# Patient Record
Sex: FEMALE | Race: BLACK OR AFRICAN AMERICAN | Employment: FULL TIME | URBAN - METROPOLITAN AREA
[De-identification: names, ages, dates, MRNs, and addresses within clinical notes are randomized per-mention and may not be internally consistent; named-entity substitution may affect disease eponyms.]

---

## 2017-10-04 DIAGNOSIS — E11.9 TYPE 2 DIABETES MELLITUS WITHOUT COMPLICATION, WITHOUT LONG-TERM CURRENT USE OF INSULIN (HCC): Chronic | ICD-10-CM

## 2017-10-04 NOTE — TELEPHONE ENCOUNTER
E-scribe request for MetFORMIN HCl Oral Tablet 1000 MG. Please review and e-scribe if applicable. Last Visit Date:  11/18/16  Next Visit Date:  10/10/17    Hemoglobin A1C (%)   Date Value   11/18/2016 7.1   05/12/2016 7.5   10/12/2015 7.4             ( goal A1C is < 7)   Microalb/Crt.  Ratio (mcg/mg creat)   Date Value   05/28/2015 83     LDL Cholesterol (mg/dL)   Date Value   07/10/2015 95       (goal LDL is <100)   AST (U/L)   Date Value   05/12/2016 17     ALT (U/L)   Date Value   05/12/2016 21     BUN (mg/dL)   Date Value   11/18/2016 13     BP Readings from Last 3 Encounters:   11/18/16 134/86   05/12/16 135/86   05/11/16 148/80          (goal 120/80)        Patient Active Problem List:     Seasonal allergies     HTN (hypertension)     DM (diabetes mellitus)     Dyslipidemia     Solitary kidney     Cancer of breast (HCC)     Hip pain, bilateral     Neuropathy due to secondary diabetes ([facility]      ----JF

## 2017-11-22 ENCOUNTER — TELEPHONE (OUTPATIENT)
Dept: INTERNAL MEDICINE | Age: 57
End: 2017-11-22

## 2017-11-22 DIAGNOSIS — E11.9 TYPE 2 DIABETES MELLITUS WITHOUT COMPLICATION, WITHOUT LONG-TERM CURRENT USE OF INSULIN (HCC): Primary | Chronic | ICD-10-CM

## 2017-11-28 ENCOUNTER — OFFICE VISIT (OUTPATIENT)
Dept: INTERNAL MEDICINE | Age: 57
End: 2017-11-28

## 2017-11-28 VITALS
WEIGHT: 253 LBS | HEART RATE: 78 BPM | HEIGHT: 69 IN | DIASTOLIC BLOOD PRESSURE: 85 MMHG | SYSTOLIC BLOOD PRESSURE: 124 MMHG | BODY MASS INDEX: 37.47 KG/M2

## 2017-11-28 DIAGNOSIS — E11.9 TYPE 2 DIABETES MELLITUS WITHOUT COMPLICATION, WITHOUT LONG-TERM CURRENT USE OF INSULIN (HCC): Primary | Chronic | ICD-10-CM

## 2017-11-28 DIAGNOSIS — Z23 NEED FOR IMMUNIZATION AGAINST INFLUENZA: ICD-10-CM

## 2017-11-28 DIAGNOSIS — Z12.39 BREAST CANCER SCREENING: ICD-10-CM

## 2017-11-28 DIAGNOSIS — J30.2 SEASONAL ALLERGIC RHINITIS, UNSPECIFIED CHRONICITY, UNSPECIFIED TRIGGER: ICD-10-CM

## 2017-11-28 DIAGNOSIS — N95.1 VAGINAL DRYNESS, MENOPAUSAL: ICD-10-CM

## 2017-11-28 DIAGNOSIS — E11.8 TYPE 2 DIABETES MELLITUS WITH COMPLICATION, UNSPECIFIED LONG TERM INSULIN USE STATUS: ICD-10-CM

## 2017-11-28 DIAGNOSIS — I10 ESSENTIAL HYPERTENSION: Chronic | ICD-10-CM

## 2017-11-28 LAB — HBA1C MFR BLD: 8.2 %

## 2017-11-28 PROCEDURE — 90688 IIV4 VACCINE SPLT 0.5 ML IM: CPT

## 2017-11-28 PROCEDURE — 99214 OFFICE O/P EST MOD 30 MIN: CPT

## 2017-11-28 PROCEDURE — 99214 OFFICE O/P EST MOD 30 MIN: CPT | Performed by: INTERNAL MEDICINE

## 2017-11-28 PROCEDURE — 83036 HEMOGLOBIN GLYCOSYLATED A1C: CPT | Performed by: INTERNAL MEDICINE

## 2017-11-28 RX ORDER — GLUCOSAMINE HCL/CHONDROITIN SU 500-400 MG
CAPSULE ORAL
Qty: 100 STRIP | Refills: 5 | Status: SHIPPED | OUTPATIENT
Start: 2017-11-28 | End: 2019-01-11 | Stop reason: SDUPTHER

## 2017-11-28 RX ORDER — SPIRONOLACTONE 25 MG/1
TABLET ORAL
Qty: 90 TABLET | Refills: 0 | Status: SHIPPED | OUTPATIENT
Start: 2017-11-28 | End: 2018-06-19 | Stop reason: SDUPTHER

## 2017-11-28 RX ORDER — BENAZEPRIL HYDROCHLORIDE 40 MG/1
TABLET, FILM COATED ORAL
Qty: 90 TABLET | Refills: 0 | Status: SHIPPED | OUTPATIENT
Start: 2017-11-28 | End: 2018-06-20 | Stop reason: SDUPTHER

## 2017-11-28 RX ORDER — M-VIT,TX,IRON,MINS/CALC/FOLIC 27MG-0.4MG
1 TABLET ORAL DAILY
Qty: 90 TABLET | Refills: 1 | Status: SHIPPED | OUTPATIENT
Start: 2017-11-28 | End: 2019-01-11 | Stop reason: SDUPTHER

## 2017-11-28 RX ORDER — LANCETS
EACH MISCELLANEOUS
Qty: 100 EACH | Refills: 5 | Status: SHIPPED | OUTPATIENT
Start: 2017-11-28 | End: 2021-11-01

## 2017-11-28 RX ORDER — AMLODIPINE BESYLATE 10 MG/1
TABLET ORAL
Qty: 90 TABLET | Refills: 0 | Status: SHIPPED | OUTPATIENT
Start: 2017-11-28 | End: 2019-01-11 | Stop reason: SDUPTHER

## 2017-11-28 RX ORDER — ESTRADIOL 0.1 MG/G
1 CREAM VAGINAL DAILY
Qty: 1 TUBE | Refills: 2 | Status: SHIPPED | OUTPATIENT
Start: 2017-11-28 | End: 2019-01-11 | Stop reason: SDUPTHER

## 2017-11-28 RX ORDER — LORATADINE 10 MG/1
10 TABLET ORAL DAILY
Qty: 90 TABLET | Refills: 0 | Status: SHIPPED | OUTPATIENT
Start: 2017-11-28 | End: 2019-01-11 | Stop reason: SDUPTHER

## 2017-11-28 RX ORDER — GLIMEPIRIDE 4 MG/1
4 TABLET ORAL
Qty: 90 TABLET | Refills: 0 | Status: SHIPPED | OUTPATIENT
Start: 2017-11-28 | End: 2018-04-12 | Stop reason: SDUPTHER

## 2017-11-28 RX ORDER — UBIQUINOL 100 MG
CAPSULE ORAL
Qty: 100 EACH | Refills: 5 | Status: SHIPPED | OUTPATIENT
Start: 2017-11-28 | End: 2019-01-11 | Stop reason: SDUPTHER

## 2017-11-28 RX ORDER — CARVEDILOL 12.5 MG/1
TABLET ORAL
Qty: 180 TABLET | Refills: 0 | Status: SHIPPED | OUTPATIENT
Start: 2017-11-28 | End: 2018-04-12 | Stop reason: SDUPTHER

## 2017-11-28 RX ORDER — ATORVASTATIN CALCIUM 80 MG/1
80 TABLET, FILM COATED ORAL DAILY
Qty: 90 TABLET | Refills: 0 | Status: SHIPPED | OUTPATIENT
Start: 2017-11-28 | End: 2018-06-11 | Stop reason: SDUPTHER

## 2017-11-28 NOTE — PROGRESS NOTES
[facility]  [facility] [address] [facility]  [address]  Dept: [phone]      Today's Date: 11/28/2017  Patient Name: Nery Chavez  Patient's age: 59 y.o., 1960        CHIEF COMPLAINT:    Chief Complaint   Patient presents with    Hypertension     1 year follow up    Diabetes    Health Maintenance     Hep C, HIV, Tdap, Urine, Lipid, Foot, Eye, Flu, and A1C due. no insurance       History Obtained From:  patient    HISTORY OF PRESENT ILLNESS:      The patient is a 59 y.o. old  female and is here to Follow-up for hypertension and diabetes. Her A1c is increased to 8.2. She is on Amaryl 2 mg twice a day. She states that she has been skipping the second dose as she forgets to take it. She is also on metformin 1000 mg twice a day. She is tolerating her medications well. Her blood pressure is well controlled with Norvasc 10 mg daily, Coreg 12.5 mg twice a day, Aldactone 25 mg daily and Benzapril 40 mg daily. Denies any headache, chest pain, palpitation or fatigue. No leg edema or orthopnea. Patient Active Problem List   Diagnosis    Seasonal allergies    HTN (hypertension)    DM (diabetes mellitus)    Dyslipidemia    Solitary kidney    Cancer of breast ([facility]    Hip pain, bilateral    Neuropathy due to secondary diabetes [facility])       Past Medical History:   has a past medical history of Allergic rhinitis; Avascular necrosis ([facility] [facility] Breast cancer ([facility] [address] Congenital kidney disease; DM (diabetes mellitus) ([address] High cholesterol; Hip pain, bilateral; History of anemia; Hypertension; Neuropathy ([address] Snores; Type II or unspecified type diabetes mellitus without mention of complication, not stated as uncontrolled; and Uterine fibroid. Past Surgical History:   has a past surgical history that includes Hysterectomy; partial nephrectomy; Colonoscopy; Breast lumpectomy (Left, 2000);  Ankle fracture surgery (Right); and Upper gastrointestinal endoscopy. Medications:    Current Outpatient Prescriptions   Medication Sig Dispense Refill    metFORMIN (GLUCOPHAGE) 1000 MG tablet TAKE 1 TABLET BY MOUTH TWO TIMES A DAY  60 tablet 2    atorvastatin (LIPITOR) 80 MG tablet TAKE 1 TABLET BY MOUTH ONE TIME A DAY  90 tablet 0    linagliptin (TRADJENTA) 5 MG tablet Take 1 tablet by mouth daily 30 tablet 3    glimepiride (AMARYL) 2 MG tablet 1 tab in AM and 1 tab in  tablet 1    amLODIPine (NORVASC) 10 MG tablet TAKE 1 TABLET BY MOUTH ONE TIME A DAY 90 tablet 1    carvedilol (COREG) 12.5 MG tablet TAKE ONE TABLET BY MOUTH TWICE A DAY WITH MEALS 180 tablet 1    spironolactone (ALDACTONE) 25 MG tablet TAKE ONE TABLET BY MOUTH EVERY DAY 90 tablet 1    benazepril (LOTENSIN) 40 MG tablet TAKE ONE TABLET BY MOUTH DAILY 90 tablet 1    Glucose Blood (BLOOD GLUCOSE TEST STRIPS) STRP Use 4 times per day Diagnosis:  Diabetes Non-Insulin Dependent 100 strip 11    loratadine (CLARITIN) 10 MG tablet Take 1 tablet by mouth daily 30 tablet 4    Alcohol Swabs (ALCOHOL PREP) 70 % PADS Use 4 times times per day Diagnosis:  Diabetes Non-Insulin Dependent 100 each 11    [employer] LANCETS THIN 26G MISC USE THREE TIMES A  each 10    Multiple Vitamins-Minerals (THERAPEUTIC MULTIVITAMIN-MINERALS) tablet Take 1 tablet by mouth daily 30 tablet 11     No current facility-administered medications for this visit. Allergies: Other    Social History:   reports that she has never smoked. She has never used smokeless tobacco. She reports that she does not drink alcohol or use drugs. Family History: family history includes Cancer in her father; Shalini Weiner in her paternal aunt; Diabetes in her mother; Heart Disease in her father; High Blood Pressure in her mother. REVIEW OF SYSTEMS:      Constitutional: Negative for fever and fatigue. HENT: Negative for congestion and sore throat. Eyes: Negative for eye pain and visual disturbance. Respiratory: Negative for chest tightness and shortness of breath. Cardiovascular: Negative for chest pain and orthopnea . Gastrointestinal: Negative for vomiting, abdominal pain, constipation and diarrhea. Endocrine: Negative for cold intolerance, heat intolerance, polydipsia and polyuria. Genitourinary: Negative for dysuria and frequency. Musculoskeletal: Negative for  Myalgia, back pain, bone pain and arthralgias. Neurological: Negative for dizziness, weakness and headaches. PHYSICAL EXAM:        /85 (Site: Right Arm, Position: Sitting, Cuff Size: Large Adult)   Pulse 78   Ht 5' 9\" (1.753 m)   Wt 253 lb (114.8 kg)   BMI 37.36 kg/m²      General appearance - well appearing, not in  distress. Mental status - alert and cooperative . Head: Normocephalic, without obvious abnormality, atraumatic. Eye: PERRL, conjunctiva/corneas clear, EOM's intact. Neck - Supple, no significant adenopathy . Chest - Clear to auscultation, no wheezes, rales or rhonchi, symmetric air entry. Heart -  regular rhythm, normal S1, S2, no murmurs. Abdomen - Soft, nontender, nondistended, no masses or organomegaly. Neurological - Alert, oriented, normal speech, no focal findings or movement disorder noted. Musculoskeletal - No joint tenderness, deformity or swelling. Extremities -  No pedal edema, no clubbing or cyanosis.   Foot exam : No skin thickening, callus or ulcer, B/L DP palpable, ankle reflex normal ,monofilament test normal.       Labs:       Chemistry        Component Value Date/Time     11/18/2016 0951    K 4.2 11/18/2016 0951    CL 98 11/18/2016 0951    CO2 26 11/18/2016 0951    BUN 13 11/18/2016 0951    CREATININE 0.80 11/18/2016 0951        Component Value Date/Time    CALCIUM 9.6 11/18/2016 0951    ALKPHOS 108 (H) 05/12/2016 1003    AST 17 05/12/2016 1003    ALT 21 05/12/2016 1003    BILITOT 0.19 (L) 05/12/2016 1003          No results for input(s): GLUCOSE in the last 72 Refill: 5  -  DIABETES FOOT EXAM    2. Essential hypertension  Improved with current medication. - amLODIPine (NORVASC) 10 MG tablet; TAKE 1 TABLET BY MOUTH ONE TIME A DAY  Dispense: 90 tablet; Refill: 0  - carvedilol (COREG) 12.5 MG tablet; TAKE ONE TABLET BY MOUTH TWICE A DAY WITH MEALS  Dispense: 180 tablet; Refill: 0  - spironolactone (ALDACTONE) 25 MG tablet; TAKE ONE TABLET BY MOUTH EVERY DAY  Dispense: 90 tablet; Refill: 0  - benazepril (LOTENSIN) 40 MG tablet; TAKE ONE TABLET BY MOUTH DAILY  Dispense: 90 tablet; Refill: 0    3. Type 2 diabetes mellitus with complication, unspecified long term insulin use status (HCC)    - Alcohol Swabs (ALCOHOL PREP) 70 % PADS; Use 4 times times per day Diagnosis:  Diabetes Non-Insulin Dependent  Dispense: 100 each; Refill: 5    4. Seasonal allergic rhinitis, unspecified chronicity, unspecified trigger  - loratadine (CLARITIN) 10 MG tablet; Take 1 tablet by mouth daily  Dispense: 90 tablet; Refill: 0    5. Need for immunization against influenza    - INFLUENZA, QUADV, 6 MO AND OLDER, IM, MDV, 0.5ML (FLULAVAL QUADV)  - [place] ADMIN INFLUENZA VIRUS VAC      · Change Amary to 4 mg daily ,   · Medication compliance was discussed. · meds refilled   · Labs as ordered   · Jules Matias received counseling on the following healthy behaviors: exercise and medication adherence  · Discussed use, benefit, and side effects of prescribed medications. Barriers to medication compliance addressed. All patient questions answered. Pt voiced understanding.    · The return visit should be in 3 months      Terra Perez MD  Attending and Faculty Internal Medicine  [address] [place] [address] [address] [facility]  [address]  Dept: [phone]  11/28/17      This note is created with the assistance of a speech-recognition program. While intending to generate a document that actually reflects the content of the visit, the document can still have some

## 2017-11-29 ENCOUNTER — HOSPITAL ENCOUNTER (OUTPATIENT)
Age: 57
Setting detail: SPECIMEN
Discharge: HOME | End: 2017-11-29

## 2017-11-29 DIAGNOSIS — E11.9 TYPE 2 DIABETES MELLITUS WITHOUT COMPLICATION, WITHOUT LONG-TERM CURRENT USE OF INSULIN (HCC): Chronic | ICD-10-CM

## 2017-11-29 LAB
ABSOLUTE EOS #: 0.16 K/UL (ref 0–0.44)
ABSOLUTE IMMATURE GRANULOCYTE: 0.03 K/UL (ref 0–0.3)
ABSOLUTE LYMPH #: 2.04 K/UL (ref 1.1–3.7)
ABSOLUTE MONO #: 0.81 K/UL (ref 0.1–1.2)
ALBUMIN SERPL-MCNC: 4.1 G/DL (ref 3.5–5.2)
ALBUMIN/GLOBULIN RATIO: 1.1 (ref 1–2.5)
ALP BLD-CCNC: 110 U/L (ref 35–104)
ALT SERPL-CCNC: 12 U/L (ref 5–33)
ANION GAP SERPL CALCULATED.3IONS-SCNC: 17 MMOL/L (ref 9–17)
AST SERPL-CCNC: 11 U/L
BASOPHILS # BLD: 0 % (ref 0–2)
BASOPHILS ABSOLUTE: <0.03 K/UL (ref 0–0.2)
BILIRUB SERPL-MCNC: 0.35 MG/DL (ref 0.3–1.2)
BUN BLDV-MCNC: 16 MG/DL (ref 6–20)
BUN/CREAT BLD: ABNORMAL (ref 9–20)
CALCIUM SERPL-MCNC: 9.6 MG/DL (ref 8.6–10.4)
CHLORIDE BLD-SCNC: 102 MMOL/L (ref 98–107)
CHOLESTEROL/HDL RATIO: 3.8
CHOLESTEROL: 165 MG/DL
CO2: 23 MMOL/L (ref 20–31)
CREAT SERPL-MCNC: 0.74 MG/DL (ref 0.5–0.9)
CREATININE URINE: 179.8 MG/DL (ref 28–217)
DIFFERENTIAL TYPE: ABNORMAL
EOSINOPHILS RELATIVE PERCENT: 3 % (ref 1–4)
GFR AFRICAN AMERICAN: >60 ML/MIN
GFR NON-AFRICAN AMERICAN: >60 ML/MIN
GFR SERPL CREATININE-BSD FRML MDRD: ABNORMAL ML/MIN/{1.73_M2}
GFR SERPL CREATININE-BSD FRML MDRD: ABNORMAL ML/MIN/{1.73_M2}
GLUCOSE BLD-MCNC: 126 MG/DL (ref 70–99)
HCT VFR BLD CALC: 43.7 % (ref 36.3–47.1)
HDLC SERPL-MCNC: 44 MG/DL
HEMOGLOBIN: 13.4 G/DL (ref 11.9–15.1)
IMMATURE GRANULOCYTES: 1 %
LDL CHOLESTEROL: 100 MG/DL (ref 0–130)
LYMPHOCYTES # BLD: 33 % (ref 24–43)
MCH RBC QN AUTO: 28.6 PG (ref 25.2–33.5)
MCHC RBC AUTO-ENTMCNC: 30.7 G/DL (ref 28.4–34.8)
MCV RBC AUTO: 93.4 FL (ref 82.6–102.9)
MICROALBUMIN/CREAT 24H UR: 44 MG/L
MICROALBUMIN/CREAT UR-RTO: 24 MCG/MG CREAT
MONOCYTES # BLD: 13 % (ref 3–12)
PDW BLD-RTO: 15.1 % (ref 11.8–14.4)
PLATELET # BLD: 333 K/UL (ref 138–453)
PLATELET ESTIMATE: ABNORMAL
PMV BLD AUTO: 10.9 FL (ref 8.1–13.5)
POTASSIUM SERPL-SCNC: 4.3 MMOL/L (ref 3.7–5.3)
RBC # BLD: 4.68 M/UL (ref 3.95–5.11)
RBC # BLD: ABNORMAL 10*6/UL
SEG NEUTROPHILS: 50 % (ref 36–65)
SEGMENTED NEUTROPHILS ABSOLUTE COUNT: 3.09 K/UL (ref 1.5–8.1)
SODIUM BLD-SCNC: 142 MMOL/L (ref 135–144)
TOTAL PROTEIN: 7.8 G/DL (ref 6.4–8.3)
TRIGL SERPL-MCNC: 107 MG/DL
VLDLC SERPL CALC-MCNC: NORMAL MG/DL (ref 1–30)
WBC # BLD: 6.2 K/UL (ref 3.5–11.3)
WBC # BLD: ABNORMAL 10*3/UL

## 2017-11-29 PROCEDURE — 85025 COMPLETE CBC W/AUTO DIFF WBC: CPT

## 2017-11-29 PROCEDURE — 36415 COLL VENOUS BLD VENIPUNCTURE: CPT

## 2017-11-29 PROCEDURE — 80061 LIPID PANEL: CPT

## 2017-11-29 PROCEDURE — 80053 COMPREHEN METABOLIC PANEL: CPT

## 2017-11-29 PROCEDURE — 82570 ASSAY OF URINE CREATININE: CPT

## 2017-11-29 PROCEDURE — 82043 UR ALBUMIN QUANTITATIVE: CPT

## 2017-12-01 ENCOUNTER — TELEPHONE (OUTPATIENT)
Dept: INTERNAL MEDICINE | Age: 57
End: 2017-12-01

## 2017-12-01 DIAGNOSIS — C50.912 MALIGNANT NEOPLASM OF LEFT FEMALE BREAST, UNSPECIFIED ESTROGEN RECEPTOR STATUS, UNSPECIFIED SITE OF BREAST (HCC): Primary | ICD-10-CM

## 2017-12-01 NOTE — TELEPHONE ENCOUNTER
PC from patient returning call, let know she needed to be in office, wanted to know if she could get it done since she was just seen 3 days ago. Pt states she would be going to [address] for the Mammogram, gave her scheduling number to set that up. Let her know that her paper for preventing blindness would be up front on second floor for her to  and fill out.

## 2017-12-04 NOTE — TELEPHONE ENCOUNTER
PC to pt-spoke with her and let her know that order was placed phone number to scheduling given to pt she will call and set up appt

## 2017-12-04 NOTE — TELEPHONE ENCOUNTER
PC from patient stating that she has been having pain under breast by armpit on both sides, would like diagnostic mammogram as she has previous history of breast cancer and lumpectomy on Left side, order pended. Pt states she will  order once completed if we can call her back at [phone].

## 2017-12-07 ENCOUNTER — TELEPHONE (OUTPATIENT)
Dept: INTERNAL MEDICINE | Age: 57
End: 2017-12-07

## 2017-12-08 NOTE — TELEPHONE ENCOUNTER
Writer unable to leave message for patient to contact office regarding labs due to mailbox being full. Will call patient again.

## 2017-12-12 ENCOUNTER — HOSPITAL ENCOUNTER (OUTPATIENT)
Dept: ULTRASOUND IMAGING | Age: 57
Discharge: HOME | End: 2017-12-12

## 2017-12-12 ENCOUNTER — TELEPHONE (OUTPATIENT)
Dept: INTERNAL MEDICINE | Age: 57
End: 2017-12-12

## 2017-12-12 ENCOUNTER — HOSPITAL ENCOUNTER (OUTPATIENT)
Dept: MAMMOGRAPHY | Age: 57
Discharge: HOME | End: 2017-12-12

## 2017-12-12 DIAGNOSIS — Z12.39 BREAST CANCER SCREENING: ICD-10-CM

## 2017-12-12 DIAGNOSIS — R52 PAIN: ICD-10-CM

## 2017-12-12 DIAGNOSIS — C50.912 MALIGNANT NEOPLASM OF LEFT FEMALE BREAST, UNSPECIFIED ESTROGEN RECEPTOR STATUS, UNSPECIFIED SITE OF BREAST (HCC): ICD-10-CM

## 2017-12-12 PROCEDURE — 76642 ULTRASOUND BREAST LIMITED: CPT

## 2017-12-12 PROCEDURE — G0279 TOMOSYNTHESIS, MAMMO: HCPCS

## 2018-04-11 DIAGNOSIS — E11.9 TYPE 2 DIABETES MELLITUS WITHOUT COMPLICATION, WITHOUT LONG-TERM CURRENT USE OF INSULIN (HCC): Chronic | ICD-10-CM

## 2018-04-12 ENCOUNTER — OFFICE VISIT (OUTPATIENT)
Dept: INTERNAL MEDICINE | Age: 58
End: 2018-04-12

## 2018-04-12 ENCOUNTER — TELEPHONE (OUTPATIENT)
Dept: INTERNAL MEDICINE | Age: 58
End: 2018-04-12

## 2018-04-12 VITALS
HEIGHT: 68 IN | WEIGHT: 246 LBS | DIASTOLIC BLOOD PRESSURE: 94 MMHG | SYSTOLIC BLOOD PRESSURE: 147 MMHG | BODY MASS INDEX: 37.28 KG/M2 | HEART RATE: 81 BPM

## 2018-04-12 DIAGNOSIS — E11.9 TYPE 2 DIABETES MELLITUS WITHOUT COMPLICATION, WITHOUT LONG-TERM CURRENT USE OF INSULIN (HCC): Chronic | ICD-10-CM

## 2018-04-12 DIAGNOSIS — Z23 NEED FOR ZOSTER VACCINATION: Primary | ICD-10-CM

## 2018-04-12 DIAGNOSIS — I10 ESSENTIAL HYPERTENSION: Chronic | ICD-10-CM

## 2018-04-12 DIAGNOSIS — Z23 NEED FOR DIPHTHERIA-TETANUS-PERTUSSIS (TDAP) VACCINE: ICD-10-CM

## 2018-04-12 LAB — HBA1C MFR BLD: 6.2 %

## 2018-04-12 PROCEDURE — 99214 OFFICE O/P EST MOD 30 MIN: CPT

## 2018-04-12 PROCEDURE — 83036 HEMOGLOBIN GLYCOSYLATED A1C: CPT | Performed by: INTERNAL MEDICINE

## 2018-04-12 PROCEDURE — 99214 OFFICE O/P EST MOD 30 MIN: CPT | Performed by: INTERNAL MEDICINE

## 2018-04-12 RX ORDER — GLIMEPIRIDE 4 MG/1
4 TABLET ORAL
Qty: 90 TABLET | Refills: 0 | Status: SHIPPED | OUTPATIENT
Start: 2018-04-12 | End: 2018-06-19 | Stop reason: SDUPTHER

## 2018-04-12 RX ORDER — GLIMEPIRIDE 4 MG/1
TABLET ORAL
Qty: 90 TABLET | Refills: 0 | OUTPATIENT
Start: 2018-04-12

## 2018-04-12 RX ORDER — CARVEDILOL 25 MG/1
TABLET ORAL
Qty: 60 TABLET | Refills: 2 | Status: SHIPPED | OUTPATIENT
Start: 2018-04-12 | End: 2018-06-20 | Stop reason: SDUPTHER

## 2018-04-12 ASSESSMENT — PATIENT HEALTH QUESTIONNAIRE - PHQ9
2. FEELING DOWN, DEPRESSED OR HOPELESS: 0
1. LITTLE INTEREST OR PLEASURE IN DOING THINGS: 0
SUM OF ALL RESPONSES TO PHQ QUESTIONS 1-9: 0
SUM OF ALL RESPONSES TO PHQ9 QUESTIONS 1 & 2: 0

## 2018-04-24 DIAGNOSIS — E11.9 TYPE 2 DIABETES MELLITUS WITHOUT COMPLICATION, WITHOUT LONG-TERM CURRENT USE OF INSULIN (HCC): Chronic | ICD-10-CM

## 2018-06-12 RX ORDER — ATORVASTATIN CALCIUM 80 MG/1
TABLET, FILM COATED ORAL
Qty: 90 TABLET | Refills: 0 | Status: SHIPPED | OUTPATIENT
Start: 2018-06-12 | End: 2018-11-02 | Stop reason: SDUPTHER

## 2018-06-19 DIAGNOSIS — E11.9 TYPE 2 DIABETES MELLITUS WITHOUT COMPLICATION, WITHOUT LONG-TERM CURRENT USE OF INSULIN (HCC): Chronic | ICD-10-CM

## 2018-06-19 DIAGNOSIS — I10 ESSENTIAL HYPERTENSION: Chronic | ICD-10-CM

## 2018-06-20 RX ORDER — SPIRONOLACTONE 25 MG/1
TABLET ORAL
Qty: 90 TABLET | Refills: 0 | Status: SHIPPED | OUTPATIENT
Start: 2018-06-20 | End: 2018-11-12 | Stop reason: SDUPTHER

## 2018-06-20 RX ORDER — CARVEDILOL 12.5 MG/1
TABLET ORAL
Qty: 180 TABLET | Refills: 0 | Status: SHIPPED | OUTPATIENT
Start: 2018-06-20 | End: 2019-01-11 | Stop reason: SDUPTHER

## 2018-06-20 RX ORDER — GLIMEPIRIDE 4 MG/1
TABLET ORAL
Qty: 90 TABLET | Refills: 0 | Status: SHIPPED | OUTPATIENT
Start: 2018-06-20 | End: 2018-11-02 | Stop reason: SDUPTHER

## 2018-06-20 RX ORDER — BENAZEPRIL HYDROCHLORIDE 40 MG/1
TABLET, FILM COATED ORAL
Qty: 90 TABLET | Refills: 0 | Status: SHIPPED | OUTPATIENT
Start: 2018-06-20 | End: 2018-11-02 | Stop reason: SDUPTHER

## 2018-07-20 ENCOUNTER — OFFICE VISIT (OUTPATIENT)
Dept: INTERNAL MEDICINE | Age: 58
End: 2018-07-20

## 2018-07-20 VITALS — SYSTOLIC BLOOD PRESSURE: 135 MMHG | DIASTOLIC BLOOD PRESSURE: 84 MMHG | HEART RATE: 75 BPM

## 2018-07-20 DIAGNOSIS — I10 ESSENTIAL HYPERTENSION: Primary | ICD-10-CM

## 2018-07-20 PROCEDURE — 99213 OFFICE O/P EST LOW 20 MIN: CPT | Performed by: INTERNAL MEDICINE

## 2018-07-20 PROCEDURE — 99211 OFF/OP EST MAY X REQ PHY/QHP: CPT | Performed by: INTERNAL MEDICINE

## 2018-07-20 NOTE — PROGRESS NOTES
S: pt presents at clinic today for BP check requested by potential employer, pt had several screening tests done for a potential job she is being hired for and pt reports her BP was 160/101 . Pt stated she was super anxious and nervous when having all of the testing done. And she believes  this is why the bp was so high. O: pt oriented times 4 , Skin warm ,dry. A:  (b/p taken see vitals in chart , discussed with physician), spoke with PCP dr. Mary Freeman , per PCP bp fine pt to keep doing what she is doing now and he will see at reg sched apt . P: form filled out and scanned to media , copy given to pt.   / pt return to clinic 7/23/18 mon w/PCP

## 2018-07-30 ENCOUNTER — TELEPHONE (OUTPATIENT)
Dept: INTERNAL MEDICINE | Age: 58
End: 2018-07-30

## 2018-11-02 DIAGNOSIS — I10 ESSENTIAL HYPERTENSION: Chronic | ICD-10-CM

## 2018-11-02 DIAGNOSIS — E11.9 TYPE 2 DIABETES MELLITUS WITHOUT COMPLICATION, WITHOUT LONG-TERM CURRENT USE OF INSULIN (HCC): Chronic | ICD-10-CM

## 2018-11-03 RX ORDER — BENAZEPRIL HYDROCHLORIDE 40 MG/1
TABLET, FILM COATED ORAL
Qty: 90 TABLET | Refills: 0 | Status: SHIPPED | OUTPATIENT
Start: 2018-11-03 | End: 2019-01-11 | Stop reason: SDUPTHER

## 2018-11-03 RX ORDER — ATORVASTATIN CALCIUM 80 MG/1
TABLET, FILM COATED ORAL
Qty: 90 TABLET | Refills: 0 | Status: SHIPPED | OUTPATIENT
Start: 2018-11-03 | End: 2019-01-11 | Stop reason: SDUPTHER

## 2018-11-03 RX ORDER — GLIMEPIRIDE 4 MG/1
TABLET ORAL
Qty: 90 TABLET | Refills: 0 | Status: SHIPPED | OUTPATIENT
Start: 2018-11-03 | End: 2019-01-11 | Stop reason: SDUPTHER

## 2018-11-12 DIAGNOSIS — I10 ESSENTIAL HYPERTENSION: Chronic | ICD-10-CM

## 2018-11-12 RX ORDER — SPIRONOLACTONE 25 MG/1
TABLET ORAL
Qty: 90 TABLET | Refills: 0 | Status: SHIPPED | OUTPATIENT
Start: 2018-11-12 | End: 2019-01-11 | Stop reason: SDUPTHER

## 2019-01-11 ENCOUNTER — OFFICE VISIT (OUTPATIENT)
Dept: INTERNAL MEDICINE | Age: 59
End: 2019-01-11

## 2019-01-11 ENCOUNTER — HOSPITAL ENCOUNTER (OUTPATIENT)
Age: 59
Setting detail: SPECIMEN
Discharge: HOME | End: 2019-01-11

## 2019-01-11 VITALS
WEIGHT: 264.8 LBS | SYSTOLIC BLOOD PRESSURE: 136 MMHG | DIASTOLIC BLOOD PRESSURE: 88 MMHG | HEART RATE: 82 BPM | HEIGHT: 68 IN | BODY MASS INDEX: 40.13 KG/M2

## 2019-01-11 DIAGNOSIS — Z13.220 SCREENING FOR HYPERLIPIDEMIA: ICD-10-CM

## 2019-01-11 DIAGNOSIS — Z12.39 BREAST CANCER SCREENING: ICD-10-CM

## 2019-01-11 DIAGNOSIS — Z00.00 HEALTH CARE MAINTENANCE: ICD-10-CM

## 2019-01-11 DIAGNOSIS — N95.0 POST-MENOPAUSAL BLEEDING: ICD-10-CM

## 2019-01-11 DIAGNOSIS — E11.9 TYPE 2 DIABETES MELLITUS WITHOUT COMPLICATION, WITHOUT LONG-TERM CURRENT USE OF INSULIN (HCC): Primary | Chronic | ICD-10-CM

## 2019-01-11 DIAGNOSIS — I10 ESSENTIAL HYPERTENSION: Chronic | ICD-10-CM

## 2019-01-11 DIAGNOSIS — Z11.3 SCREEN FOR STD (SEXUALLY TRANSMITTED DISEASE): ICD-10-CM

## 2019-01-11 DIAGNOSIS — N95.1 VAGINAL DRYNESS, MENOPAUSAL: ICD-10-CM

## 2019-01-11 LAB
ABSOLUTE EOS #: 0.18 K/UL (ref 0–0.44)
ABSOLUTE IMMATURE GRANULOCYTE: 0.03 K/UL (ref 0–0.3)
ABSOLUTE LYMPH #: 2.27 K/UL (ref 1.1–3.7)
ABSOLUTE MONO #: 0.98 K/UL (ref 0.1–1.2)
ALBUMIN SERPL-MCNC: 4.4 G/DL (ref 3.5–5.2)
ALBUMIN/GLOBULIN RATIO: 1.3 (ref 1–2.5)
ALP BLD-CCNC: 112 U/L (ref 35–104)
ALT SERPL-CCNC: 13 U/L (ref 5–33)
ANION GAP SERPL CALCULATED.3IONS-SCNC: 17 MMOL/L (ref 9–17)
AST SERPL-CCNC: 11 U/L
BASOPHILS # BLD: 1 % (ref 0–2)
BASOPHILS ABSOLUTE: 0.04 K/UL (ref 0–0.2)
BILIRUB SERPL-MCNC: <0.1 MG/DL (ref 0.3–1.2)
BUN BLDV-MCNC: 20 MG/DL (ref 8–23)
BUN/CREAT BLD: ABNORMAL (ref 9–20)
CALCIUM SERPL-MCNC: 10.2 MG/DL (ref 8.6–10.4)
CHLORIDE BLD-SCNC: 102 MMOL/L (ref 98–107)
CHOLESTEROL/HDL RATIO: 3.1
CHOLESTEROL: 155 MG/DL
CO2: 26 MMOL/L (ref 20–31)
CREAT SERPL-MCNC: 0.87 MG/DL (ref 0.5–0.9)
DIFFERENTIAL TYPE: ABNORMAL
EOSINOPHILS RELATIVE PERCENT: 2 % (ref 1–4)
GFR AFRICAN AMERICAN: >60 ML/MIN
GFR NON-AFRICAN AMERICAN: >60 ML/MIN
GFR SERPL CREATININE-BSD FRML MDRD: ABNORMAL ML/MIN/{1.73_M2}
GFR SERPL CREATININE-BSD FRML MDRD: ABNORMAL ML/MIN/{1.73_M2}
GLUCOSE BLD-MCNC: 91 MG/DL (ref 70–99)
HBA1C MFR BLD: 6.9 %
HBV SURFACE AB TITR SER: <3.5 MIU/ML
HCT VFR BLD CALC: 44.9 % (ref 36.3–47.1)
HDLC SERPL-MCNC: 50 MG/DL
HEMOGLOBIN: 14.1 G/DL (ref 11.9–15.1)
HEPATITIS C ANTIBODY: NONREACTIVE
IMMATURE GRANULOCYTES: 0 %
LDL CHOLESTEROL: 80 MG/DL (ref 0–130)
LYMPHOCYTES # BLD: 29 % (ref 24–43)
MCH RBC QN AUTO: 29.4 PG (ref 25.2–33.5)
MCHC RBC AUTO-ENTMCNC: 31.4 G/DL (ref 28.4–34.8)
MCV RBC AUTO: 93.5 FL (ref 82.6–102.9)
MONOCYTES # BLD: 13 % (ref 3–12)
NRBC AUTOMATED: 0 PER 100 WBC
PDW BLD-RTO: 14.4 % (ref 11.8–14.4)
PLATELET # BLD: 344 K/UL (ref 138–453)
PLATELET ESTIMATE: ABNORMAL
PMV BLD AUTO: 10.8 FL (ref 8.1–13.5)
POTASSIUM SERPL-SCNC: 4 MMOL/L (ref 3.7–5.3)
RBC # BLD: 4.8 M/UL (ref 3.95–5.11)
RBC # BLD: ABNORMAL 10*6/UL
SEG NEUTROPHILS: 55 % (ref 36–65)
SEGMENTED NEUTROPHILS ABSOLUTE COUNT: 4.29 K/UL (ref 1.5–8.1)
SODIUM BLD-SCNC: 145 MMOL/L (ref 135–144)
T. PALLIDUM, IGG: NONREACTIVE
TOTAL PROTEIN: 7.8 G/DL (ref 6.4–8.3)
TRIGL SERPL-MCNC: 126 MG/DL
VLDLC SERPL CALC-MCNC: NORMAL MG/DL (ref 1–30)
WBC # BLD: 7.8 K/UL (ref 3.5–11.3)
WBC # BLD: ABNORMAL 10*3/UL

## 2019-01-11 PROCEDURE — 36415 COLL VENOUS BLD VENIPUNCTURE: CPT

## 2019-01-11 PROCEDURE — 99211 OFF/OP EST MAY X REQ PHY/QHP: CPT | Performed by: INTERNAL MEDICINE

## 2019-01-11 PROCEDURE — 87389 HIV-1 AG W/HIV-1&-2 AB AG IA: CPT

## 2019-01-11 PROCEDURE — 83036 HEMOGLOBIN GLYCOSYLATED A1C: CPT | Performed by: INTERNAL MEDICINE

## 2019-01-11 PROCEDURE — 86780 TREPONEMA PALLIDUM: CPT

## 2019-01-11 PROCEDURE — 86317 IMMUNOASSAY INFECTIOUS AGENT: CPT

## 2019-01-11 PROCEDURE — 99214 OFFICE O/P EST MOD 30 MIN: CPT | Performed by: INTERNAL MEDICINE

## 2019-01-11 PROCEDURE — 80053 COMPREHEN METABOLIC PANEL: CPT

## 2019-01-11 PROCEDURE — 80061 LIPID PANEL: CPT

## 2019-01-11 PROCEDURE — 86803 HEPATITIS C AB TEST: CPT

## 2019-01-11 PROCEDURE — 85025 COMPLETE CBC W/AUTO DIFF WBC: CPT

## 2019-01-11 RX ORDER — ATORVASTATIN CALCIUM 80 MG/1
TABLET, FILM COATED ORAL
Qty: 90 TABLET | Refills: 1 | Status: SHIPPED | OUTPATIENT
Start: 2019-01-11 | End: 2019-01-11 | Stop reason: SDUPTHER

## 2019-01-11 RX ORDER — ESTRADIOL 0.1 MG/G
1 CREAM VAGINAL DAILY
Qty: 1 TUBE | Refills: 5 | Status: SHIPPED | OUTPATIENT
Start: 2019-01-11 | End: 2019-12-23

## 2019-01-11 RX ORDER — SPIRONOLACTONE 25 MG/1
TABLET ORAL
Qty: 90 TABLET | Refills: 0 | Status: SHIPPED | OUTPATIENT
Start: 2019-01-11 | End: 2019-10-16 | Stop reason: SDUPTHER

## 2019-01-11 RX ORDER — GLUCOSAMINE HCL/CHONDROITIN SU 500-400 MG
CAPSULE ORAL
Qty: 100 STRIP | Refills: 5 | Status: SHIPPED | OUTPATIENT
Start: 2019-01-11 | End: 2019-10-16 | Stop reason: SDUPTHER

## 2019-01-11 RX ORDER — LORATADINE 10 MG/1
10 TABLET ORAL DAILY
Qty: 90 TABLET | Refills: 0 | Status: SHIPPED | OUTPATIENT
Start: 2019-01-11 | End: 2020-11-16 | Stop reason: SDUPTHER

## 2019-01-11 RX ORDER — UBIQUINOL 100 MG
CAPSULE ORAL
Qty: 100 EACH | Refills: 5 | Status: SHIPPED | OUTPATIENT
Start: 2019-01-11 | End: 2019-10-16 | Stop reason: SDUPTHER

## 2019-01-11 RX ORDER — M-VIT,TX,IRON,MINS/CALC/FOLIC 27MG-0.4MG
1 TABLET ORAL DAILY
Qty: 90 TABLET | Refills: 1 | Status: SHIPPED | OUTPATIENT
Start: 2019-01-11 | End: 2019-12-23

## 2019-01-11 RX ORDER — BENAZEPRIL HYDROCHLORIDE 40 MG/1
TABLET, FILM COATED ORAL
Qty: 90 TABLET | Refills: 1 | Status: SHIPPED | OUTPATIENT
Start: 2019-01-11 | End: 2019-10-16 | Stop reason: SDUPTHER

## 2019-01-11 RX ORDER — GLIMEPIRIDE 4 MG/1
TABLET ORAL
Qty: 90 TABLET | Refills: 0 | Status: SHIPPED | OUTPATIENT
Start: 2019-01-11 | End: 2019-06-13 | Stop reason: SDUPTHER

## 2019-01-11 RX ORDER — CARVEDILOL 12.5 MG/1
TABLET ORAL
Qty: 180 TABLET | Refills: 1 | Status: SHIPPED | OUTPATIENT
Start: 2019-01-11 | End: 2019-10-16 | Stop reason: SDUPTHER

## 2019-01-11 RX ORDER — AMLODIPINE BESYLATE 10 MG/1
TABLET ORAL
Qty: 90 TABLET | Refills: 0 | Status: SHIPPED | OUTPATIENT
Start: 2019-01-11 | End: 2019-10-16 | Stop reason: SDUPTHER

## 2019-01-11 RX ORDER — ATORVASTATIN CALCIUM 80 MG/1
TABLET, FILM COATED ORAL
Qty: 90 TABLET | Refills: 1 | Status: SHIPPED | OUTPATIENT
Start: 2019-01-11 | End: 2019-10-16

## 2019-01-12 LAB — HIV AG/AB: NONREACTIVE

## 2019-01-14 ENCOUNTER — TELEPHONE (OUTPATIENT)
Dept: INTERNAL MEDICINE | Age: 59
End: 2019-01-14

## 2019-01-14 DIAGNOSIS — Z20.2 GONORRHEA CONTACT, UNTREATED: Primary | ICD-10-CM

## 2019-01-14 RX ORDER — AZITHROMYCIN 500 MG/1
1000 TABLET, FILM COATED ORAL ONCE
Qty: 2 TABLET | Refills: 0 | Status: SHIPPED | OUTPATIENT
Start: 2019-01-14 | End: 2019-01-14

## 2019-01-15 ENCOUNTER — NURSE ONLY (OUTPATIENT)
Dept: INTERNAL MEDICINE | Age: 59
End: 2019-01-15

## 2019-01-15 DIAGNOSIS — Z20.2 EXPOSURE TO STD: Primary | ICD-10-CM

## 2019-01-15 PROCEDURE — 96372 THER/PROPH/DIAG INJ SC/IM: CPT

## 2019-01-15 PROCEDURE — 6360000002 HC RX W HCPCS

## 2019-01-15 RX ORDER — CEFTRIAXONE SODIUM 250 MG/1
250 INJECTION, POWDER, FOR SOLUTION INTRAMUSCULAR; INTRAVENOUS ONCE
Status: COMPLETED | OUTPATIENT
Start: 2019-01-15 | End: 2019-01-15

## 2019-01-15 RX ADMIN — CEFTRIAXONE SODIUM 250 MG: 250 INJECTION, POWDER, FOR SOLUTION INTRAMUSCULAR; INTRAVENOUS at 10:48

## 2019-01-18 ENCOUNTER — TELEPHONE (OUTPATIENT)
Dept: INTERNAL MEDICINE | Age: 59
End: 2019-01-18

## 2019-01-28 ENCOUNTER — HOSPITAL ENCOUNTER (OUTPATIENT)
Dept: MAMMOGRAPHY | Age: 59
Discharge: HOME | End: 2019-01-30

## 2019-01-28 DIAGNOSIS — Z12.39 BREAST CANCER SCREENING: ICD-10-CM

## 2019-01-28 PROCEDURE — 77067 SCR MAMMO BI INCL CAD: CPT

## 2019-02-01 ENCOUNTER — TELEPHONE (OUTPATIENT)
Dept: INTERNAL MEDICINE | Age: 59
End: 2019-02-01

## 2019-04-09 ENCOUNTER — OFFICE VISIT (OUTPATIENT)
Dept: OBGYN | Age: 59
End: 2019-04-09
Payer: COMMERCIAL

## 2019-04-09 VITALS
HEART RATE: 85 BPM | BODY MASS INDEX: 39.01 KG/M2 | HEIGHT: 68 IN | WEIGHT: 257.4 LBS | DIASTOLIC BLOOD PRESSURE: 85 MMHG | SYSTOLIC BLOOD PRESSURE: 130 MMHG

## 2019-04-09 DIAGNOSIS — Z90.710 ABSENCE OF BOTH CERVIX AND UTERUS, ACQUIRED: ICD-10-CM

## 2019-04-09 DIAGNOSIS — N93.0 POSTCOITAL BLEEDING: Primary | ICD-10-CM

## 2019-04-09 DIAGNOSIS — M99.03 SOMATIC DYSFUNCTION OF SPINE, LUMBAR: ICD-10-CM

## 2019-04-09 DIAGNOSIS — Z90.710 HISTORY OF HYSTERECTOMY FOR BENIGN DISEASE: ICD-10-CM

## 2019-04-09 DIAGNOSIS — N95.2 VAGINAL ATROPHY: ICD-10-CM

## 2019-04-09 DIAGNOSIS — Z98.890 HISTORY OF LUMPECTOMY OF LEFT BREAST: ICD-10-CM

## 2019-04-09 DIAGNOSIS — M99.02 SOMATIC DYSFUNCTION OF SPINE, THORACIC: ICD-10-CM

## 2019-04-09 PROCEDURE — 99203 OFFICE O/P NEW LOW 30 MIN: CPT | Performed by: STUDENT IN AN ORGANIZED HEALTH CARE EDUCATION/TRAINING PROGRAM

## 2019-04-09 PROCEDURE — 99386 PREV VISIT NEW AGE 40-64: CPT | Performed by: STUDENT IN AN ORGANIZED HEALTH CARE EDUCATION/TRAINING PROGRAM

## 2019-04-09 NOTE — PROGRESS NOTES
OB/GYN Problem Visit    Iaziah Cifuentes  4/9/2019                       Primary Care Physician: Cortland Meckel, MD    CC:   Chief Complaint   Patient presents with    New Patient    Menopause         HPI: Izaiah Cifuentes is a 59 y.o. female   [id number]    She has a history of hysterectomy (unknown cervical status) with both ovaries intact performed by Dr. Kranthi Marin. Reports her last menses was 18 years ago. Reports post-coital bleeding. Has not seen or had a gynecologist since her hysterectomy. Hx of breast cancer in 1999 treated with left lumpectomy, chemo and radiation (states \"Stage 2\", does not remember in [place] by Dr. Pratibha Clarke at [facility] in [place], [place]). Her last mammogram was BIRADS CATEGORY 2 1/28/19. Patient reports she was never on Tamoxifen. The patient was seen and examined. She is here for postcoital bleeding for about one year with last episode 2-3 months. She has no dyspareunia or vaginal itching or dryness. She is not sure if she has a cervix or not - reports \"can't remember, my surgery was so long ago\". Her No LMP recorded. Patient has had a hysterectomy. Her bowel habits are regular. She denies any bloating. She denies dysuria. She denies urinary leaking. She denies vaginal discharge. She is sexually active with male partner (). She uses hysterectomy for contraception and nothing for barrier protection. She also complains of back pain. She also has left sided mid-back pain by her scapula that is sharp and burning in nature improved by massage. Reports that has been told her hip is lower than the other and that she may need a hip replacement eventually. 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 Denies any fracture history needing surgery.  She did have a broken ankle during her treatment for breast cancer that required surgery (but patient reports her doctor told her THREE TIMES A  each 5    Alcohol Swabs (ALCOHOL PREP) 70 % PADS Use 4 times times per day Diagnosis:  Diabetes Non-Insulin Dependent 100 each 5    Multiple Vitamins-Minerals (THERAPEUTIC MULTIVITAMIN-MINERALS) tablet Take 1 tablet by mouth daily 90 tablet 1    estradiol (ESTRACE VAGINAL) 0.1 MG/GM vaginal cream Place 1 g vaginally daily 1 Tube 5     No current facility-administered medications for this visit. ALLERGIES:  Allergies as of 04/09/2019 - Review Complete 04/09/2019   Allergen Reaction Noted    Other Nausea Only 10/12/2012                                   VITALS:  Vitals:    04/09/19 1533   BP: 130/85   Site: Right Upper Arm   Position: Sitting   Cuff Size: Large Adult   Pulse: 85   Weight: 257 lb 6.4 oz (116.8 kg)   Height: 5' 8\" (1.727 m)                                                                                                                                                                         PHYSICAL EXAM:     General Appearance: Obese. Alert; in no acute distress. Pleasant. Skin: Normal. Small open comedones throughout skin on back especially. Enlarged pores. Lymphatic: No cervical, superclavicular, axillary, or inguinal adenopathy.   HEENT: normocephalic and atraumatic, Thyroid normal to inspection and palpation  Respiratory: clear to auscultation, no wheezes, rales or rhonchi, symmetric air entry  Cardiovascular: regular rate and rhythm, no murmurs rubs or gallops  Breast:  (Chest):    Right: normal appearance, no masses or tenderness, Inspection negative, No nipple retraction or dimpling, No nipple discharge or bleeding, No axillary or supraclavicular adenopathy, Normal to palpation without dominant masses   Left: scar tissue palpable with evidence of lumpectomy and lymphadenectomy scars, no areola or nipple present, no palpable masses or nodules, non-tender to palpation    Abdomen: soft, non-tender, non-distended, no right upper quadrant tenderness and no CVA lumpectomy and treatment in 2000       FINDINGS:   There are scattered fibroglandular densities in both breasts.  Lumpectomy   changes are noted in the left breast, unchanged from previous mammograms. There is no suspicious mass, architectural distortion, or calcification.           Impression   No mammographic findings of malignancy.       BIRADS:   BIRADS - CATEGORY 2       Benign, no evidence of malignancy.  Normal interval follow-up is recommended   in 12 months.       OVERALL ASSESSMENT - BENIGN       A letter of notification will be sent to the patient regarding the results.       The [facility] [employer] of Radiology recommends annual mammograms for women 40   years and older.        ASSESSMENT & PLAN:    Delbert Paz is a 59 y.o. female [id number] with postcoital bleeding secondary to vaginal atrophy   - Patient declines local vaginal estrogen therapy as she has no dyspareunia or other issues   - Cervix surgically absent   - Reports menopausal since 2000    Somatic dysfunction of spine, thoracic and lumbar   - Long standing muscle aches   - Hypertrophied left-sided muscles, posterior thoracic ribs on the left   - lumbar spine rotated left from L2-L4    - Low suspicion for metastatic or other process   - Improved by massages, encouraged further massage and discussed hot tubs and water therapy as needed   - Patient to see physician for potential hip replacement in the future    History of hysterectomy for benign disease (fibroids)   - Vaginal cuff intact   - Cervix surgically absent, not palpated or visualized   - Will attempt to obtain records    History of lumpectomy of left breast (2000)    - S/p chemo and radiation   - Reports physician told her to avoid hormone replacement, which is also why she kept her ovaries during her hysterectomy       Patient Active Problem List    Diagnosis Date Noted    Postcoital bleeding secondary to vaginal atrophy 04/09/2019    Somatic dysfunction of spine, thoracic 04/09/2019 uterus, acquired     7.  Vaginal atrophy         Luis F Enrique DO  Ob/Gyn Resident  [facility], [facility]  4/9/2019, 5:51 PM

## 2019-04-10 NOTE — PROGRESS NOTES
Attending Physician Statement  I have discussed the care of Jamar Regalado, including pertinent history and exam findings,  with the resident. I have reviewed the key elements of all parts of the encounter with the resident. I agree with the assessment, plan and orders as documented by the resident.   (GE Modifier)      Leila Yin DO

## 2019-06-13 DIAGNOSIS — E11.9 TYPE 2 DIABETES MELLITUS WITHOUT COMPLICATION, WITHOUT LONG-TERM CURRENT USE OF INSULIN (HCC): Chronic | ICD-10-CM

## 2019-06-13 NOTE — TELEPHONE ENCOUNTER
Request for amaryl. Next Visit Date:  Future Appointments   Date Time Provider Lucas Martines   7/12/2019  3:30 PM Laverne Cosby MD [address] [facility]   Topic Date Due    DTaP/Tdap/Td vaccine (1 - Tdap) 09/01/1977    Shingles Vaccine (1 of 2) 09/01/2008    Diabetic retinal exam  10/28/2016    Diabetic microalbuminuria test  11/29/2018    Flu vaccine (Season Ended) 09/01/2019    Diabetic foot exam  01/11/2020    A1C test (Diabetic or Prediabetic)  01/11/2020    Lipid screen  01/11/2020    Potassium monitoring  01/11/2020    Creatinine monitoring  01/11/2020    Breast cancer screen  01/28/2021    Colon cancer screen colonoscopy  05/11/2026    Pneumococcal 0-64 years Vaccine  Completed    Hepatitis C screen  Completed    HIV screen  Completed       Hemoglobin A1C (%)   Date Value   01/11/2019 6.9   04/12/2018 6.2   11/28/2017 8.2             ( goal A1C is < 7)   Microalb/Crt.  Ratio (mcg/mg creat)   Date Value   11/29/2017 24     LDL Cholesterol (mg/dL)   Date Value   01/11/2019 80       (goal LDL is <100)   AST (U/L)   Date Value   01/11/2019 11     ALT (U/L)   Date Value   01/11/2019 13     BUN (mg/dL)   Date Value   01/11/2019 20     BP Readings from Last 3 Encounters:   04/09/19 130/85   01/11/19 136/88   07/20/18 135/84          (goal 120/80)    All Future Testing planned in [facility]  Lab Frequency Next Occurrence   Microalbumin, Ur Once 06/26/2019         Patient Active Problem List:     Seasonal allergies     HTN (hypertension)     DM (diabetes mellitus)     Dyslipidemia     Solitary kidney     Cancer of breast (HCC)     Hip pain, bilateral     Neuropathy due to secondary diabetes ([address]     Postcoital bleeding secondary to vaginal atrophy     Somatic dysfunction of spine, thoracic     Somatic dysfunction of spine, lumbar     History of hysterectomy for benign disease (fibroids)     History of lumpectomy of left breast     Absence of both cervix and uterus, acquired

## 2019-06-14 RX ORDER — GLIMEPIRIDE 4 MG/1
TABLET ORAL
Qty: 90 TABLET | Refills: 0 | Status: SHIPPED | OUTPATIENT
Start: 2019-06-14 | End: 2019-10-16 | Stop reason: SDUPTHER

## 2019-07-03 DIAGNOSIS — E11.9 TYPE 2 DIABETES MELLITUS WITHOUT COMPLICATION, WITHOUT LONG-TERM CURRENT USE OF INSULIN (HCC): Chronic | ICD-10-CM

## 2019-07-16 RX ORDER — ATORVASTATIN CALCIUM 80 MG/1
TABLET, FILM COATED ORAL
Qty: 90 TABLET | Refills: 0 | Status: SHIPPED | OUTPATIENT
Start: 2019-07-16 | End: 2019-10-16 | Stop reason: SDUPTHER

## 2019-10-16 ENCOUNTER — OFFICE VISIT (OUTPATIENT)
Dept: INTERNAL MEDICINE | Age: 59
End: 2019-10-16
Payer: COMMERCIAL

## 2019-10-16 ENCOUNTER — HOSPITAL ENCOUNTER (OUTPATIENT)
Age: 59
Setting detail: SPECIMEN
Discharge: HOME | End: 2019-10-16

## 2019-10-16 VITALS
SYSTOLIC BLOOD PRESSURE: 123 MMHG | HEIGHT: 68 IN | DIASTOLIC BLOOD PRESSURE: 88 MMHG | BODY MASS INDEX: 38.83 KG/M2 | HEART RATE: 83 BPM | WEIGHT: 256.2 LBS

## 2019-10-16 DIAGNOSIS — I10 ESSENTIAL HYPERTENSION: Chronic | ICD-10-CM

## 2019-10-16 DIAGNOSIS — E78.5 DYSLIPIDEMIA: Chronic | ICD-10-CM

## 2019-10-16 DIAGNOSIS — J30.2 SEASONAL ALLERGIES: ICD-10-CM

## 2019-10-16 DIAGNOSIS — E11.9 TYPE 2 DIABETES MELLITUS WITHOUT COMPLICATION, WITHOUT LONG-TERM CURRENT USE OF INSULIN (HCC): Chronic | ICD-10-CM

## 2019-10-16 DIAGNOSIS — E11.9 TYPE 2 DIABETES MELLITUS WITHOUT COMPLICATION, WITHOUT LONG-TERM CURRENT USE OF INSULIN (HCC): Primary | Chronic | ICD-10-CM

## 2019-10-16 DIAGNOSIS — Z85.3 HISTORY OF BREAST CANCER: ICD-10-CM

## 2019-10-16 LAB
ANION GAP SERPL CALCULATED.3IONS-SCNC: 15 MMOL/L (ref 9–17)
BUN BLDV-MCNC: 27 MG/DL (ref 8–23)
BUN/CREAT BLD: ABNORMAL (ref 9–20)
CALCIUM SERPL-MCNC: 10 MG/DL (ref 8.6–10.4)
CHLORIDE BLD-SCNC: 104 MMOL/L (ref 98–107)
CO2: 21 MMOL/L (ref 20–31)
CREAT SERPL-MCNC: 1.08 MG/DL (ref 0.5–0.9)
CREATININE URINE: 229.7 MG/DL (ref 28–217)
GFR AFRICAN AMERICAN: >60 ML/MIN
GFR NON-AFRICAN AMERICAN: 52 ML/MIN
GFR SERPL CREATININE-BSD FRML MDRD: ABNORMAL ML/MIN/{1.73_M2}
GFR SERPL CREATININE-BSD FRML MDRD: ABNORMAL ML/MIN/{1.73_M2}
GLUCOSE BLD-MCNC: 107 MG/DL (ref 70–99)
HBA1C MFR BLD: NORMAL %
MICROALBUMIN/CREAT 24H UR: 24 MG/L
MICROALBUMIN/CREAT UR-RTO: 10 MCG/MG CREAT
POTASSIUM SERPL-SCNC: 4.4 MMOL/L (ref 3.7–5.3)
SODIUM BLD-SCNC: 140 MMOL/L (ref 135–144)

## 2019-10-16 PROCEDURE — 99213 OFFICE O/P EST LOW 20 MIN: CPT | Performed by: STUDENT IN AN ORGANIZED HEALTH CARE EDUCATION/TRAINING PROGRAM

## 2019-10-16 PROCEDURE — 36415 COLL VENOUS BLD VENIPUNCTURE: CPT

## 2019-10-16 PROCEDURE — 83036 HEMOGLOBIN GLYCOSYLATED A1C: CPT | Performed by: STUDENT IN AN ORGANIZED HEALTH CARE EDUCATION/TRAINING PROGRAM

## 2019-10-16 PROCEDURE — 82570 ASSAY OF URINE CREATININE: CPT

## 2019-10-16 PROCEDURE — 82043 UR ALBUMIN QUANTITATIVE: CPT

## 2019-10-16 PROCEDURE — 80048 BASIC METABOLIC PNL TOTAL CA: CPT

## 2019-10-16 PROCEDURE — 99211 OFF/OP EST MAY X REQ PHY/QHP: CPT | Performed by: INTERNAL MEDICINE

## 2019-10-16 RX ORDER — GLIMEPIRIDE 4 MG/1
TABLET ORAL
Qty: 90 TABLET | Refills: 0 | Status: SHIPPED | OUTPATIENT
Start: 2019-10-16 | End: 2020-04-07

## 2019-10-16 RX ORDER — CARVEDILOL 12.5 MG/1
TABLET ORAL
Qty: 180 TABLET | Refills: 1 | Status: SHIPPED | OUTPATIENT
Start: 2019-10-16 | End: 2020-11-16 | Stop reason: SDUPTHER

## 2019-10-16 RX ORDER — BLOOD-GLUCOSE METER
KIT MISCELLANEOUS
Qty: 1 KIT | Refills: 0 | Status: SHIPPED | OUTPATIENT
Start: 2019-10-16 | End: 2020-04-14 | Stop reason: SDUPTHER

## 2019-10-16 RX ORDER — AMLODIPINE BESYLATE 10 MG/1
TABLET ORAL
Qty: 90 TABLET | Refills: 0 | Status: SHIPPED | OUTPATIENT
Start: 2019-10-16 | End: 2020-08-03

## 2019-10-16 RX ORDER — BENAZEPRIL HYDROCHLORIDE 40 MG/1
TABLET, FILM COATED ORAL
Qty: 90 TABLET | Refills: 1 | Status: SHIPPED | OUTPATIENT
Start: 2019-10-16 | End: 2020-11-16 | Stop reason: SDUPTHER

## 2019-10-16 RX ORDER — SPIRONOLACTONE 25 MG/1
TABLET ORAL
Qty: 90 TABLET | Refills: 0 | Status: SHIPPED | OUTPATIENT
Start: 2019-10-16 | End: 2020-10-23

## 2019-10-16 RX ORDER — ATORVASTATIN CALCIUM 80 MG/1
TABLET, FILM COATED ORAL
Qty: 90 TABLET | Refills: 0 | Status: SHIPPED | OUTPATIENT
Start: 2019-10-16 | End: 2020-11-16 | Stop reason: SDUPTHER

## 2019-10-16 RX ORDER — UBIQUINOL 100 MG
CAPSULE ORAL
Qty: 100 EACH | Refills: 5 | Status: SHIPPED | OUTPATIENT
Start: 2019-10-16 | End: 2020-04-14 | Stop reason: SDUPTHER

## 2019-10-16 RX ORDER — GLUCOSAMINE HCL/CHONDROITIN SU 500-400 MG
CAPSULE ORAL
Qty: 100 STRIP | Refills: 5 | Status: SHIPPED | OUTPATIENT
Start: 2019-10-16 | End: 2020-04-14 | Stop reason: SDUPTHER

## 2019-10-16 RX ORDER — ATORVASTATIN CALCIUM 80 MG/1
TABLET, FILM COATED ORAL
Qty: 90 TABLET | Refills: 0 | Status: SHIPPED | OUTPATIENT
Start: 2019-10-16 | End: 2019-10-16 | Stop reason: SDUPTHER

## 2019-10-17 ENCOUNTER — TELEPHONE (OUTPATIENT)
Dept: ONCOLOGY | Age: 59
End: 2019-10-17

## 2019-12-23 ENCOUNTER — HOSPITAL ENCOUNTER (EMERGENCY)
Age: 59
Discharge: HOME | End: 2019-12-23
Attending: EMERGENCY MEDICINE

## 2019-12-23 ENCOUNTER — APPOINTMENT (OUTPATIENT)
Dept: GENERAL RADIOLOGY | Age: 59
End: 2019-12-23

## 2019-12-23 VITALS
SYSTOLIC BLOOD PRESSURE: 158 MMHG | HEART RATE: 80 BPM | OXYGEN SATURATION: 94 % | RESPIRATION RATE: 16 BRPM | DIASTOLIC BLOOD PRESSURE: 90 MMHG | TEMPERATURE: 98.4 F

## 2019-12-23 DIAGNOSIS — R05.9 COUGH: Primary | ICD-10-CM

## 2019-12-23 LAB
DIRECT EXAM: NORMAL
Lab: NORMAL
SPECIMEN DESCRIPTION: NORMAL

## 2019-12-23 PROCEDURE — 93005 ELECTROCARDIOGRAM TRACING: CPT | Performed by: EMERGENCY MEDICINE

## 2019-12-23 PROCEDURE — 99284 EMERGENCY DEPT VISIT MOD MDM: CPT

## 2019-12-23 PROCEDURE — 87804 INFLUENZA ASSAY W/OPTIC: CPT

## 2019-12-23 PROCEDURE — 71046 X-RAY EXAM CHEST 2 VIEWS: CPT

## 2019-12-23 PROCEDURE — 6370000000 HC RX 637 (ALT 250 FOR IP): Performed by: EMERGENCY MEDICINE

## 2019-12-23 RX ORDER — GUAIFENESIN/DEXTROMETHORPHAN 100-10MG/5
5 SYRUP ORAL 3 TIMES DAILY PRN
Qty: 120 ML | Refills: 0 | Status: SHIPPED | OUTPATIENT
Start: 2019-12-23 | End: 2020-01-02

## 2019-12-23 RX ORDER — HYDROCODONE POLISTIREX AND CHLORPHENIRAMINE POLISTIREX 10; 8 MG/5ML; MG/5ML
5 SUSPENSION, EXTENDED RELEASE ORAL ONCE
Status: COMPLETED | OUTPATIENT
Start: 2019-12-23 | End: 2019-12-23

## 2019-12-23 RX ADMIN — Medication 5 ML: at 11:27

## 2019-12-23 ASSESSMENT — ENCOUNTER SYMPTOMS
EYE DISCHARGE: 0
FACIAL SWELLING: 0
ABDOMINAL PAIN: 0
BACK PAIN: 0
CHEST TIGHTNESS: 0
EYE PAIN: 0
ABDOMINAL DISTENTION: 0
COUGH: 1

## 2019-12-24 LAB
EKG ATRIAL RATE: 76 BPM
EKG P AXIS: 54 DEGREES
EKG P-R INTERVAL: 162 MS
EKG Q-T INTERVAL: 366 MS
EKG QRS DURATION: 80 MS
EKG QTC CALCULATION (BAZETT): 411 MS
EKG T AXIS: 53 DEGREES
EKG VENTRICULAR RATE: 76 BPM

## 2019-12-24 PROCEDURE — 93010 ELECTROCARDIOGRAM REPORT: CPT | Performed by: INTERNAL MEDICINE

## 2020-02-19 ENCOUNTER — HOSPITAL ENCOUNTER (OUTPATIENT)
Dept: GENERAL RADIOLOGY | Age: 60
Discharge: HOME | End: 2020-02-21

## 2020-02-19 ENCOUNTER — HOSPITAL ENCOUNTER (OUTPATIENT)
Age: 60
Discharge: HOME | End: 2020-02-21

## 2020-02-19 ENCOUNTER — OFFICE VISIT (OUTPATIENT)
Dept: PRIMARY CARE CLINIC | Age: 60
End: 2020-02-19

## 2020-02-19 VITALS
OXYGEN SATURATION: 95 % | BODY MASS INDEX: 38.92 KG/M2 | TEMPERATURE: 98.4 F | WEIGHT: 256 LBS | DIASTOLIC BLOOD PRESSURE: 96 MMHG | HEART RATE: 84 BPM | SYSTOLIC BLOOD PRESSURE: 171 MMHG

## 2020-02-19 PROCEDURE — 99202 OFFICE O/P NEW SF 15 MIN: CPT | Performed by: NURSE PRACTITIONER

## 2020-02-19 PROCEDURE — 73610 X-RAY EXAM OF ANKLE: CPT

## 2020-02-19 RX ORDER — CYCLOBENZAPRINE HCL 10 MG
10 TABLET ORAL NIGHTLY PRN
Qty: 10 TABLET | Refills: 0 | Status: SHIPPED | OUTPATIENT
Start: 2020-02-19 | End: 2020-02-29

## 2020-02-19 ASSESSMENT — PATIENT HEALTH QUESTIONNAIRE - PHQ9
SUM OF ALL RESPONSES TO PHQ QUESTIONS 1-9: 2
1. LITTLE INTEREST OR PLEASURE IN DOING THINGS: 0
SUM OF ALL RESPONSES TO PHQ9 QUESTIONS 1 & 2: 2
SUM OF ALL RESPONSES TO PHQ QUESTIONS 1-9: 2
2. FEELING DOWN, DEPRESSED OR HOPELESS: 2

## 2020-02-19 ASSESSMENT — ENCOUNTER SYMPTOMS
ABDOMINAL PAIN: 0
DIARRHEA: 0
VOMITING: 0
BACK PAIN: 1
NAUSEA: 0
SHORTNESS OF BREATH: 0
SINUS PAIN: 0
SORE THROAT: 0
COUGH: 0

## 2020-02-19 NOTE — PATIENT INSTRUCTIONS
flat on the floor. 2. Press your injured foot inward against your other foot. Hold for about 6 seconds, and relax. Repeat 8 to 12 times. 3. Then place the heel of your other foot on top of the injured one. Push down with the top heel while trying to push up with your injured foot. Hold for about 6 seconds, and relax. Repeat 8 to 12 times. Follow-up care is a key part of your treatment and safety. Be sure to make and go to all appointments, and call your doctor if you are having problems. It's also a good idea to know your test results and keep a list of the medicines you take. Where can you learn more? Go to [email or web address] org and sign in to your [employer] account. Enter A375 in the ScreachTV box to learn more about \"Ankle: Exercises. \"     If you do not have an account, please click on the \"Sign Up Now\" link. Current as of: June 26, 2019  Content Version: 12.3  © 0409-5478 Healthwise, Incorporated. Care instructions adapted under license by [facility] ([facility]). If you have questions about a medical condition or this instruction, always ask your healthcare professional. [place] [address] any warranty or liability for your use of this information.

## 2020-02-19 NOTE — PROGRESS NOTES
Visit Information    Have you changed or started any medications since your last visit including any over-the-counter medicines, vitamins, or herbal medicines? no   Are you having any side effects from any of your medications? -  no  Have you stopped taking any of your medications? Is so, why? -  no    Have you seen any other physician or provider since your last visit? No  Have you had any other diagnostic tests since your last visit? No  Have you been seen in the emergency room and/or had an admission to a hospital since we last saw you? Yes - Records Obtained  Have you had your routine dental cleaning in the past 6 months? no    Have you activated your [employer] account? If not, what are your barriers?  Yes     Patient Care Team:  Donald Meyer MD as PCP - General (Internal Medicine)    Medical History Review  Past Medical, Family, and Social History reviewed and does contribute to the patient presenting condition    Health Maintenance   Topic Date Due    DTaP/Tdap/Td vaccine (1 - Tdap) 09/01/1969    Hepatitis B vaccine (1 of 3 - Risk 3-dose series) 09/01/1977    Shingles Vaccine (1 of 2) 09/01/2008    Diabetic retinal exam  10/28/2016    Flu vaccine (1) 09/01/2019    Diabetic foot exam  01/11/2020    Lipid screen  01/11/2020    Breast cancer screen  01/28/2020    A1C test (Diabetic or Prediabetic)  10/16/2020    Diabetic microalbuminuria test  10/16/2020    Potassium monitoring  10/16/2020    Creatinine monitoring  10/16/2020    Colon cancer screen colonoscopy  05/11/2026    Pneumococcal 0-64 years Vaccine  Completed    Hepatitis C screen  Completed    HIV screen  Completed    Hepatitis A vaccine  Aged Out    Hib vaccine  Aged Out    Meningococcal (ACWY) vaccine  Aged Out

## 2020-02-19 NOTE — PROGRESS NOTES
[address] WALK-IN PRIMARY CARE  [facility] [place] [address]  Dept: [phone]  Dept Fax: [phone]    Ximena Nettles is a 62 y.o. female who presents to the urgent care today for her medicalconditions/complaints as noted below. Ximena Nettles is c/o of Foot Pain (Left foot has a open wound on the heal and a knot near the ankle. )      HPI:     56-year-old female patient presents with complaint of left ankle swelling and left-sided middle back pain. Patient describes these problems as ongoing. Patient describes the back pain is located to the middle back left side scapular region. Describes the pain as tight, spasms which worsened with certain movement such as turning, twisting, bending. Denies any acute injury or trauma to the region. Denies previous history of surgery to this region    Additionally patient has concerns over swelling to the left ankle. Describes this is been ongoing, increases pain with prolonged standing. Relieving factors include none.   Worsening factors include none      Past Medical History:   Diagnosis Date    Allergic rhinitis     Avascular necrosis (HCC)     bilateral hips    Breast cancer ([address] 2000    LEFT, s/p lumpectomy, chemo and radiation    Congenital kidney disease     has had left kidney removed as a child    DM (diabetes mellitus) ([address]     High cholesterol     Hip pain, bilateral     History of anemia     Hypertension     Neuropathy     Snores     Type II or unspecified type diabetes mellitus without mention of complication, not stated as uncontrolled     Uterine fibroid         Current Outpatient Medications   Medication Sig Dispense Refill    cyclobenzaprine (FLEXERIL) 10 MG tablet Take 1 tablet by mouth nightly as needed for Muscle spasms 10 tablet 0    glucose monitoring kit (FREESTYLE) monitoring kit Check glucose 4 times daily 1 kit 0    glimepiride (AMARYL) 4 MG tablet TAKE 1 TABLET BY MOUTH ONCE DAILY IN THE MORNING BEFORE BREAKFAST 90 tablet 0    spironolactone (ALDACTONE) 25 MG tablet TAKE 1 TABLET BY MOUTH ONCE DAILY 90 tablet 0    benazepril (LOTENSIN) 40 MG tablet TAKE 1 TABLET BY MOUTH ONCE DAILY 90 tablet 1    carvedilol (COREG) 12.5 MG tablet TAKE ONE TABLET BY MOUTH TWICE DAILY WITH MEALS 180 tablet 1    blood glucose monitor strips Use 4 times per day Diagnosis:  Diabetes Non-Insulin Dependent 100 strip 5    amLODIPine (NORVASC) 10 MG tablet TAKE 1 TABLET BY MOUTH ONE TIME A DAY 90 tablet 0    Alcohol Swabs (ALCOHOL PREP) 70 % PADS Use 4 times times per day Diagnosis:  Diabetes Non-Insulin Dependent 100 each 5    metFORMIN (GLUCOPHAGE) 1000 MG tablet TAKE 1 TABLET BY MOUTH TWO TIMES A DAY WITH MEALS 180 tablet 0    atorvastatin (LIPITOR) 80 MG tablet TAKE 1 TABLET BY MOUTH ONE TIME A DAY 90 tablet 0    loratadine (CLARITIN) 10 MG tablet Take 1 tablet by mouth daily 90 tablet 0    KROGER LANCETS THIN 26G MISC USE THREE TIMES A DAY (Patient not taking: Reported on 10/16/2019) 100 each 5     No current facility-administered medications for this visit. Allergies   Allergen Reactions    Other Nausea Only     Shrimp makes pt nauseated. Denies problems with other shellfish or seafood  Shrimp makes pt nauseated. Denies problems with other shellfish or seafood       Subjective:      Review of Systems   Constitutional: Negative for chills and fever. HENT: Negative for ear pain, sinus pain and sore throat. Respiratory: Negative for cough and shortness of breath. Cardiovascular: Negative for chest pain and palpitations. Gastrointestinal: Negative for abdominal pain, diarrhea, nausea and vomiting. Musculoskeletal: Positive for back pain and joint swelling (left ankle). Neurological: Negative for dizziness and headaches. All other systems reviewed and are negative. Objective:     Physical Exam  Vitals signs and nursing note reviewed.    Constitutional:

## 2020-04-14 ENCOUNTER — TELEPHONE (OUTPATIENT)
Dept: INTERNAL MEDICINE | Age: 60
End: 2020-04-14

## 2020-04-14 ENCOUNTER — VIRTUAL VISIT (OUTPATIENT)
Dept: INTERNAL MEDICINE | Age: 60
End: 2020-04-14

## 2020-04-14 PROCEDURE — 99213 OFFICE O/P EST LOW 20 MIN: CPT | Performed by: INTERNAL MEDICINE

## 2020-04-14 RX ORDER — BLOOD-GLUCOSE METER
KIT MISCELLANEOUS
Qty: 1 KIT | Refills: 0 | Status: SHIPPED | OUTPATIENT
Start: 2020-04-14 | End: 2021-04-13

## 2020-04-14 RX ORDER — GLUCOSAMINE HCL/CHONDROITIN SU 500-400 MG
CAPSULE ORAL
Qty: 100 STRIP | Refills: 11 | Status: SHIPPED | OUTPATIENT
Start: 2020-04-14 | End: 2021-11-01

## 2020-04-14 RX ORDER — GLUCOSAMINE HCL/CHONDROITIN SU 500-400 MG
CAPSULE ORAL
Qty: 100 STRIP | Refills: 5 | Status: SHIPPED | OUTPATIENT
Start: 2020-04-14 | End: 2020-11-16 | Stop reason: SDUPTHER

## 2020-04-14 RX ORDER — MEDICAL SUPPLY, MISCELLANEOUS
1 EACH MISCELLANEOUS DAILY
Qty: 1 EACH | Refills: 0 | Status: SHIPPED | OUTPATIENT
Start: 2020-04-14 | End: 2021-04-13

## 2020-04-14 RX ORDER — LANCETS 30 GAUGE
1 EACH MISCELLANEOUS 2 TIMES DAILY
Qty: 100 EACH | Refills: 11 | Status: SHIPPED | OUTPATIENT
Start: 2020-04-14

## 2020-04-14 RX ORDER — UBIQUINOL 100 MG
CAPSULE ORAL
Qty: 100 EACH | Refills: 5 | Status: SHIPPED | OUTPATIENT
Start: 2020-04-14 | End: 2020-11-16 | Stop reason: SDUPTHER

## 2020-04-14 ASSESSMENT — ENCOUNTER SYMPTOMS
ABDOMINAL PAIN: 0
GASTROINTESTINAL NEGATIVE: 1
RESPIRATORY NEGATIVE: 1

## 2020-04-14 NOTE — PROGRESS NOTES
of ataxia         [] Normal range of motion of neck        [] Abnormal-       Neurological:        [] No Facial Asymmetry (Cranial nerve 7 motor function) (limited exam to video visit)          [] No gaze palsy        [] Abnormal-         Skin:        [] No significant exanthematous lesions or discoloration noted on facial skin         [] Abnormal-            Psychiatric:       [] Normal Affect [] No Hallucinations        [] Abnormal-     Other pertinent observable physical exam findings-     ASSESSMENT/PLAN:  1. Type 2 diabetes mellitus without complication, without long-term current use of insulin ([facility])    - glucose monitoring kit (FREESTYLE) monitoring kit; Check glucose 4 times daily  Dispense: 1 kit; Refill: 0  - blood glucose monitor strips; Use 4 times per day Diagnosis:  Diabetes Non-Insulin Dependent  Dispense: 100 strip; Refill: 5  - Alcohol Swabs (ALCOHOL PREP) 70 % PADS; Use 4 times times per day Diagnosis:  Diabetes Non-Insulin Dependent  Dispense: 100 each; Refill: 5  - blood glucose monitor strips; Test__2_times daily    Diagnosis: 250.0   Diabetes Mellitus____Insulin Dependent____Non-Insulin Dependent  Dispense: 100 strip; Refill: 11  - Lancets MISC; 1 each by Does not apply route 2 times daily Use___times daily    Diagnisis:250.0  Diabetes Mellitus____Insulin Dependent___Non-Insulin Dependent  Dispense: 100 each; Refill: 11    2. Encounter for screening mammogram for breast cancer    - RIP DIGITAL SCREEN W OR WO CAD BILATERAL; Future    3. Azotemia    - CBC with Differential; Future  - Basic Metabolic Panel; Future  - Iron and TIBC; Future    4. Essential hypertension      Monitor bp     No follow-ups on file. Shashank Cueva is a 62 y.o. female being evaluated by a Virtual Visit (video visit) encounter to address concerns as mentioned above. A caregiver was present when appropriate.  Due to this being a TeleHealth encounter ([facility]), evaluation of the following organ systems was limited: Vitals/Constitutional/EENT/Resp/CV/GI//MS/Neuro/Skin/Heme-Lymph-Imm. Pursuant to the emergency declaration under the [address] [place] [address] and the [employer] Act, this Virtual Visit was conducted with patient's (and/or legal guardian's) consent, to reduce the patient's risk of exposure to COVID-19 and provide necessary medical care. The patient (and/or legal guardian) has also been advised to contact this office for worsening conditions or problems, and seek emergency medical treatment and/or call 911 if deemed necessary. Services were provided through a video synchronous discussion virtually to substitute for in-person clinic visit. Patient and provider were located at their individual homes. --Joanne Mccarthy MD on 4/14/2020 at 3:36 PM    An electronic signature was used to authenticate this note.

## 2020-04-14 NOTE — TELEPHONE ENCOUNTER
Anthony Please call Ms Davie Holly she is having a hard time connecting with her VVisit, can reach her at [phone] thank u

## 2020-07-31 ENCOUNTER — OFFICE VISIT (OUTPATIENT)
Dept: ORTHOPEDIC SURGERY | Age: 60
End: 2020-07-31

## 2020-07-31 VITALS
SYSTOLIC BLOOD PRESSURE: 155 MMHG | HEIGHT: 68 IN | WEIGHT: 256 LBS | HEART RATE: 79 BPM | BODY MASS INDEX: 38.8 KG/M2 | DIASTOLIC BLOOD PRESSURE: 100 MMHG

## 2020-07-31 PROCEDURE — 99204 OFFICE O/P NEW MOD 45 MIN: CPT | Performed by: ORTHOPAEDIC SURGERY

## 2020-07-31 RX ORDER — LIDOCAINE 4 G/G
PATCH TOPICAL
Qty: 14 PATCH | Refills: 0 | Status: SHIPPED | OUTPATIENT
Start: 2020-07-31 | End: 2020-11-16 | Stop reason: SDUPTHER

## 2020-07-31 NOTE — TELEPHONE ENCOUNTER
PC to patient - LM for patient to contact office to schedule appt.
Seasonal allergies     HTN (hypertension)     DM (diabetes mellitus)     Dyslipidemia     Solitary kidney     Cancer of breast (HCC)     Hip pain, bilateral     Neuropathy due to secondary diabetes ([facility]     Postcoital bleeding secondary to vaginal atrophy     Somatic dysfunction of spine, thoracic     Somatic dysfunction of spine, lumbar     History of hysterectomy for benign disease (fibroids)     History of lumpectomy of left breast     Absence of both cervix and uterus, acquired     Vaginal atrophy

## 2020-07-31 NOTE — PROGRESS NOTES
[place] [facility]  Counts include 234 beds at the [facility]  [address] [place] [address]  Dept: [phone]    Ambulatory Orthopedic Consult      CHIEF COMPLAINT:    Chief Complaint   Patient presents with    Ankle Pain     left ankle       HISTORY OF PRESENT ILLNESS:      The patient is a 62 y.o. female who is being seen for consultation and evaluation of pain at the left lateral hindfoot greater than medial hindfoot. The pain is described mainly with mechanical terms (dull/sharp/throbbing). The pain is worse with activity and better with rest. The patient reports a progressive course. The patient has tried:      []  rest/activity modification          []  NSAIDs      []  opiates      []  orthotics        []  change in shoes   []  home exercises  []  physical therapy      []  CAM boot     []  brace:    []  injection:       []  surgery:      She is referred here today by Duglas Solorzano, APR*. REVIEW OF SYSTEMS:  Constitutional: Negative for fever. HENT: Negative for tinnitus. Eyes: Negative for pain. Respiratory: Negative for shortness of breath. Cardiovascular: Negative for chest pain. Gastrointestinal: Negative for abdominal pain. Genitourinary: Negative for dysuria. Skin: Negative for rash. Neurological: Negative for headaches. Hematological: Does not bruise/bleed easily. Musculoskeletal: See HPI for pertinent positives     Past Medical History:    She  has a past medical history of Allergic rhinitis, Avascular necrosis ([address] Breast cancer ([address] (2000), Congenital kidney disease, DM (diabetes mellitus) ([address] High cholesterol, Hip pain, bilateral, History of anemia, Hypertension, Neuropathy, Snores, Type II or unspecified type diabetes mellitus without mention of complication, not stated as uncontrolled, and Uterine fibroid.      Past Surgical History:    She  has a past surgical history that includes Hysterectomy (2000); partial nephrectomy; Colonoscopy; Breast lumpectomy (Left, 2000); Ankle fracture surgery (Right); and Upper gastrointestinal endoscopy.      Current Medications:     Current Outpatient Medications:     glucose monitoring kit (FREESTYLE) monitoring kit, Check glucose 4 times daily, Disp: 1 kit, Rfl: 0    blood glucose monitor strips, Use 4 times per day Diagnosis:  Diabetes Non-Insulin Dependent, Disp: 100 strip, Rfl: 5    Alcohol Swabs (ALCOHOL PREP) 70 % PADS, Use 4 times times per day Diagnosis:  Diabetes Non-Insulin Dependent, Disp: 100 each, Rfl: 5    blood glucose monitor strips, Test__2_times daily  Diagnosis: 250.0   Diabetes Mellitus____Insulin Dependent____Non-Insulin Dependent, Disp: 100 strip, Rfl: 11    Lancets MISC, 1 each by Does not apply route 2 times daily Use___times daily  Diagnisis:250.0  Diabetes Mellitus____Insulin Dependent___Non-Insulin Dependent, Disp: 100 each, Rfl: 11    Blood Pressure Monitoring (B-D ASSURE BPM/AUTO ARM CUFF) MISC, 1 Device by Does not apply route daily, Disp: 1 each, Rfl: 0    glimepiride (AMARYL) 4 MG tablet, TAKE 1 TABLET BY MOUTH ONCE DAILY IN THE MORNING BEFORE BREAKFAST, Disp: 90 tablet, Rfl: 3    spironolactone (ALDACTONE) 25 MG tablet, TAKE 1 TABLET BY MOUTH ONCE DAILY, Disp: 90 tablet, Rfl: 0    benazepril (LOTENSIN) 40 MG tablet, TAKE 1 TABLET BY MOUTH ONCE DAILY, Disp: 90 tablet, Rfl: 1    carvedilol (COREG) 12.5 MG tablet, TAKE ONE TABLET BY MOUTH TWICE DAILY WITH MEALS, Disp: 180 tablet, Rfl: 1    amLODIPine (NORVASC) 10 MG tablet, TAKE 1 TABLET BY MOUTH ONE TIME A DAY, Disp: 90 tablet, Rfl: 0    metFORMIN (GLUCOPHAGE) 1000 MG tablet, TAKE 1 TABLET BY MOUTH TWO TIMES A DAY WITH MEALS, Disp: 180 tablet, Rfl: 0    atorvastatin (LIPITOR) 80 MG tablet, TAKE 1 TABLET BY MOUTH ONE TIME A DAY, Disp: 90 tablet, Rfl: 0    loratadine (CLARITIN) 10 MG tablet, Take 1 tablet by mouth daily (Patient not taking: Reported on 4/14/2020), Disp: 90 tablet, Rfl: 0   KROGER LANCETS THIN 26G MISC, USE THREE TIMES A DAY (Patient not taking: Reported on 10/16/2019), Disp: 100 each, Rfl: 5     Allergies: Other    Family History:  family history includes Cancer in her father; Aaron Miramontes in her paternal aunt; Diabetes in her mother; Heart Disease in her father; High Blood Pressure in her mother. Social History:   Social History     Occupational History    Not on file   Tobacco Use    Smoking status: Never Smoker    Smokeless tobacco: Never Used   Substance and Sexual Activity    Alcohol use: No     Alcohol/week: 0.0 standard drinks    Drug use: No    Sexual activity: Yes     Partners: Male     Occupation: Works full-time at [employer] on her feet. She reports she has a son in [place] who is in medical school becoming a psychiatrist.     OBJECTIVE:  BP (!) 155/100   Pulse 79   Ht 5' 8\" (1.727 m)   Wt 256 lb (116.1 kg)   BMI 38.92 kg/m²    Psych: alert and oriented to person, time, and place  Cardio:  well perfused extremities  Resp:  normal respiratory effort  Skin:  no cyanosis  Hem/lymph:  no lymphedema  Neuro:  sensation to light touch grossly intact throughout all nerve distributions in the foot   Musculoskeletal:    RLE:  Alignment:  Heel neutral, pes planus  Vascular: Toes warm and well perfused, compartments soft/compressible, no significant swelling of foot. Skin: Intact without rash/lesions/AV malformations. Strength: Able to fire/perform the following with appropriate strength:    [x]  Tib Ant:     [x]  Gastroc-Soleus:         []  Inversion: weak   [x]  Eversion:         [x]  FHL:     [x]  EHL:      Motion:  Normal for the following joints:    [x]  Ankle:      [x]  Subtalar:     [x]  1st MTP:        []  1st TMT:            Tenderness to Palpation:    Tenderness to palpation:  along course of PTT, sinus Tarsi  -Equinus contracture      LLE:  Alignment:  Heel neutral, pes planus  Vascular:  Toes warm and well perfused, compartments soft/compressible, no the likely diagnosis and its natural history, physical exam and imaging findings, as well as treatment options in detail. We discussed rest/activity modification, swelling control, NSAIDs/Acetaminophen/topical anesthetics, orthotics/shoewear modification, bracing/immobilization, injections, and physical therapy. Surgically, we discussed a reconstruction of the foot. The patient wishes to proceed with the recommendations as above. Orders/referrals were placed as below at today's visit. I also provided information on an over the counter flatfoot style orthotic, including how to obtain it. I referred the patient to physical therapy for my flatfoot protocol. She was ordered compression socks for swelling control. She was also ordered topical lidocaine patches for pain control. I provided a prescription for Voltaren (4g TOPL q QID PRN pain). I recommend this over the use of oral NSAIDs because given the patient's condition, the use of oral NSAIDs for an extended period of time will likely be necessary to help allow appropriate meaningful baseline function; and given the risk for development of side effects (GI bleeding/ulcers) with chronic use of oral anti-inflammatories, this is a much safer option. This is especially important in this situation given the patient's age, diabetes, and history of 1 kidney as well. I recommended that she not begin this medication, until she checked with her PCP given her history of 1 kidney, and she expressed verbal understanding of this. All questions were answered and the patient agrees with the above plan. The patient will return to clinic in 3 months with contralateral right foot and ankle x-rays. At her next visit, we will also discuss her contralateral right foot, which most likely has a similar issue. Return in about 3 months (around 10/31/2020). No orders of the defined types were placed in this encounter.     Orders Placed This Encounter   Procedures  Ambulatory referral to Physical Therapy     Referral Priority:   Routine     Referral Type:   Eval and Treat     Referral Reason:   Specialty Services Required     Requested Specialty:   Physical Therapy     Number of Visits Requested:   1    DME Order for (Specify) as OP     DME: compression stockings (8-15 mm Hg)  Routine, External, Referral By - Danna Honeycutt, [address], Supply Name: Compression Stockings Prognosis: good Estimated length of need: 99         Wagner Mckeon MD  Orthopedic Surgery, Foot and Ankle        Please excuse any typos/errors, as this note was created with the assistance of voice recognition software. While intending to generate a document that actually reflects the content of the visit, the document can still have some errors including those of syntax and sound-a-like substitutions which may escape proof reading. In such instances, actual meaning can be extrapolated by context.

## 2020-07-31 NOTE — LETTER
Dr. Kar Sanders  [address] [facility]  [phone]        2020     Patient: Parag De La Torre  YOB: 1960    Dear José Miguel Bradford, APR*,    I had the pleasure of seeing one of your patients, Parag De La Torre, recently in the office. Below are the relevant portions of my assessment and plan of care. ASSESSMENT AND PLAN:  She has left posterior tibial tendinitis with insufficiency and midfoot collapse, exacerbated by equinus contracture, along with some mild degenerative changes of the subtalar joint. Notably, she has a somewhat complex past medical history including but not limited to the followin kidney, non-insulin-dependent diabetes. I had a long discussion today with the patient about the likely diagnosis and its natural history, physical exam and imaging findings, as well as treatment options in detail. We discussed rest/activity modification, swelling control, NSAIDs/Acetaminophen/topical anesthetics, orthotics/shoewear modification, bracing/immobilization, injections, and physical therapy. Surgically, we discussed a reconstruction of the foot. The patient wishes to proceed with the recommendations as above. Orders/referrals were placed as below at today's visit. I also provided information on an over the counter flatfoot style orthotic, including how to obtain it. I referred the patient to physical therapy for my flatfoot protocol. She was ordered compression socks for swelling control. She was also ordered topical lidocaine patches for pain control. I provided a prescription for Voltaren (4g TOPL q QID PRN pain).  I recommend this over the use of oral NSAIDs because given the patient's condition, the use of oral NSAIDs for an extended period of time will likely be necessary to help allow appropriate meaningful baseline function; and given the risk for development of side effects (GI bleeding/ulcers) with chronic use of oral anti-inflammatories, this is a much safer option. This is especially important in this situation given the patient's age, diabetes, and history of 1 kidney as well. I recommended that she not begin this medication, until she checked with her PCP given her history of 1 kidney, and she expressed verbal understanding of this. All questions were answered and the patient agrees with the above plan. The patient will return to clinic in 3 months. At her next visit, we will also discuss her contralateral right foot, which most likely has a similar issue. Thank you for allowing me to participate in the care of this patient. I look forward to serving you and your patients again in the future. Please don't hesitate to contact me at my mobile number .         Yohana Lee MD  Orthopedic Surgery, Foot and Ankle

## 2020-07-31 NOTE — LETTER
Dr. Faina Waggoner  [address] [place] [address] [address] [facility]  [phone]        2020     Patient: Kymberly Perry  YOB: 1960    Dear Victoriano Dawn MD,    I had the pleasure of seeing one of your patients, Kymberly Perry recently in the office. Below are the relevant portions of my assessment and plan of care. ASSESSMENT AND PLAN:  She has left posterior tibial tendinitis with insufficiency and midfoot collapse, exacerbated by equinus contracture, along with some mild degenerative changes of the subtalar joint. Notably, she has a somewhat complex past medical history including but not limited to the followin kidney, non-insulin-dependent diabetes. I had a long discussion today with the patient about the likely diagnosis and its natural history, physical exam and imaging findings, as well as treatment options in detail. We discussed rest/activity modification, swelling control, NSAIDs/Acetaminophen/topical anesthetics, orthotics/shoewear modification, bracing/immobilization, injections, and physical therapy. Surgically, we discussed a reconstruction of the foot. The patient wishes to proceed with the recommendations as above. Orders/referrals were placed as below at today's visit. I also provided information on an over the counter flatfoot style orthotic, including how to obtain it. I referred the patient to physical therapy for my flatfoot protocol. She was ordered compression socks for swelling control. She was also ordered topical lidocaine patches for pain control. I provided a prescription for Voltaren (4g TOPL q QID PRN pain).  I recommend this over the use of oral NSAIDs because given the patient's condition, the use of oral NSAIDs for an extended period of time will likely be necessary to help allow appropriate meaningful baseline function; and given the risk for development of side effects (GI bleeding/ulcers) with chronic use of oral anti-inflammatories, this is a much safer option. This is especially important in this situation given the patient's age, diabetes, and history of 1 kidney as well. I recommended that she not begin this medication, until she checked with her PCP given her history of 1 kidney, and she expressed verbal understanding of this. All questions were answered and the patient agrees with the above plan. The patient will return to clinic in 3 months. At her next visit, we will also discuss her contralateral right foot, which most likely has a similar issue. I look forward to serving you and your patients again in the future. Please don't hesitate to contact me at my mobile number .         Supa Nelson MD  Orthopedic Surgery, Foot and Ankle

## 2020-08-03 RX ORDER — AMLODIPINE BESYLATE 10 MG/1
TABLET ORAL
Qty: 90 TABLET | Refills: 0 | Status: SHIPPED | OUTPATIENT
Start: 2020-08-03 | End: 2020-11-16 | Stop reason: SDUPTHER

## 2020-08-13 ENCOUNTER — TELEPHONE (OUTPATIENT)
Dept: INTERNAL MEDICINE | Age: 60
End: 2020-08-13

## 2020-09-11 ENCOUNTER — HOSPITAL ENCOUNTER (OUTPATIENT)
Dept: MAMMOGRAPHY | Age: 60
Discharge: HOME | End: 2020-09-13

## 2020-09-11 PROCEDURE — 77063 BREAST TOMOSYNTHESIS BI: CPT

## 2020-10-15 ENCOUNTER — TELEPHONE (OUTPATIENT)
Dept: INTERNAL MEDICINE | Age: 60
End: 2020-10-15

## 2020-10-15 NOTE — LETTER
[address]  [address] [address]  Phone: [phone]  Fax: [phone]    Toma Che MD        October 15, 2020    [address] [place] [address] [address] [place] [address] [place] [address]      Dear Heather Sutherland: We are sending this letter because your PCP ordered [facility] for you to have done at your last visit here and they have not yet been completed. If you can please come to our office on the 2nd floor to  your orders to have them compelted. If you do not have a follow-up appointment scheduled you can either contact the office to make an appointment with us or you can make one when you come in to pick-up your orders. If you have any questions or concerns, please don't hesitate to call.     Sincerely,        Toma Che MD

## 2020-10-19 NOTE — TELEPHONE ENCOUNTER
Patient Active Problem List:     Seasonal allergies     HTN (hypertension)     DM (diabetes mellitus)     Dyslipidemia     Solitary kidney     Cancer of breast (HCC)     Hip pain, bilateral     Neuropathy due to secondary diabetes ([facility]     Postcoital bleeding secondary to vaginal atrophy     Somatic dysfunction of spine, thoracic     Somatic dysfunction of spine, lumbar     History of hysterectomy for benign disease (fibroids)     History of lumpectomy of left breast     Absence of both cervix and uterus, acquired     Vaginal atrophy

## 2020-10-23 RX ORDER — SPIRONOLACTONE 25 MG/1
TABLET ORAL
Qty: 30 TABLET | Refills: 0 | Status: SHIPPED | OUTPATIENT
Start: 2020-10-23 | End: 2020-11-11 | Stop reason: SDUPTHER

## 2020-11-11 ENCOUNTER — TELEPHONE (OUTPATIENT)
Dept: INTERNAL MEDICINE | Age: 60
End: 2020-11-11

## 2020-11-11 NOTE — TELEPHONE ENCOUNTER
Patients appointment had to be rescheduled this afternoon. She states her granddaughter was just treated for pink eye & she has now developed the same. Patient is requesting an eyedrop be sent in for her as she does not want to get sent home from work. Patient is also in need of medication refills. Please send to pharmacy if appropriate.

## 2020-11-16 RX ORDER — GLIMEPIRIDE 4 MG/1
4 TABLET ORAL
Qty: 90 TABLET | Refills: 3 | Status: SHIPPED | OUTPATIENT
Start: 2020-11-16 | End: 2021-11-01 | Stop reason: SDUPTHER

## 2020-11-16 RX ORDER — BENAZEPRIL HYDROCHLORIDE 40 MG/1
TABLET, FILM COATED ORAL
Qty: 90 TABLET | Refills: 1 | Status: SHIPPED | OUTPATIENT
Start: 2020-11-16 | End: 2021-08-03

## 2020-11-16 RX ORDER — AMLODIPINE BESYLATE 10 MG/1
TABLET ORAL
Qty: 90 TABLET | Refills: 0 | Status: SHIPPED | OUTPATIENT
Start: 2020-11-16 | End: 2021-04-12

## 2020-11-16 RX ORDER — ATORVASTATIN CALCIUM 80 MG/1
TABLET, FILM COATED ORAL
Qty: 90 TABLET | Refills: 0 | Status: SHIPPED | OUTPATIENT
Start: 2020-11-16 | End: 2021-08-03

## 2020-11-16 RX ORDER — UBIQUINOL 100 MG
CAPSULE ORAL
Qty: 100 EACH | Refills: 5 | Status: SHIPPED | OUTPATIENT
Start: 2020-11-16 | End: 2022-02-22 | Stop reason: SDUPTHER

## 2020-11-16 RX ORDER — GLUCOSAMINE HCL/CHONDROITIN SU 500-400 MG
CAPSULE ORAL
Qty: 100 STRIP | Refills: 5 | Status: SHIPPED | OUTPATIENT
Start: 2020-11-16 | End: 2021-11-01 | Stop reason: SDUPTHER

## 2020-11-16 RX ORDER — LORATADINE 10 MG/1
10 TABLET ORAL DAILY
Qty: 90 TABLET | Refills: 0 | Status: SHIPPED | OUTPATIENT
Start: 2020-11-16 | End: 2022-02-22 | Stop reason: SDUPTHER

## 2020-11-16 RX ORDER — LIDOCAINE 4 G/G
PATCH TOPICAL
Qty: 14 PATCH | Refills: 0 | Status: SHIPPED | OUTPATIENT
Start: 2020-11-16 | End: 2022-02-22 | Stop reason: SDUPTHER

## 2020-11-16 RX ORDER — CIPROFLOXACIN HYDROCHLORIDE 3.5 MG/ML
1 SOLUTION/ DROPS TOPICAL
Qty: 120 DROP | Refills: 0 | Status: SHIPPED | OUTPATIENT
Start: 2020-11-16 | End: 2020-11-26

## 2020-11-16 RX ORDER — SPIRONOLACTONE 25 MG/1
50 TABLET ORAL DAILY
Qty: 30 TABLET | Refills: 11 | Status: SHIPPED | OUTPATIENT
Start: 2020-11-16 | End: 2021-04-13 | Stop reason: SDUPTHER

## 2020-11-16 RX ORDER — CARVEDILOL 12.5 MG/1
TABLET ORAL
Qty: 180 TABLET | Refills: 1 | Status: SHIPPED | OUTPATIENT
Start: 2020-11-16 | End: 2021-08-03

## 2021-01-25 ENCOUNTER — TELEPHONE (OUTPATIENT)
Dept: INTERNAL MEDICINE | Age: 61
End: 2021-01-25

## 2021-01-25 NOTE — LETTER
[address]  [address] [address]  Phone: [phone]  Fax: [phone]    Jacob Spears MD        January 25, 2021    [place] [address]      Dear Giovanny Mobley: This letter is a reminder that you may have diagnostic testing that has not been completed. It is important to your well-being that these test(s) are performed. Please find the outstanding test(s) attached. If you could please have these completed before your next appointment. You can have the test completed at any [facility] facility or Lab. Please see the order for scheduling instructions. Any testing that needs completed other than blood work or xray's please call [phone] to schedule an appointment. Otherwise can be done at any [facility]. Please call our office at Dept: [phone] for additional information on the outstanding tests or let us know if they have been completed so we may update your chart. If you have any questions or concerns, please don't hesitate to call.     Sincerely,        Jacob Spears MD

## 2021-02-15 ENCOUNTER — TELEPHONE (OUTPATIENT)
Dept: INTERNAL MEDICINE | Age: 61
End: 2021-02-15

## 2021-02-15 NOTE — TELEPHONE ENCOUNTER
Pt had to cancel 2/15 due to inclement weather and was scheduled In the next available slot 4/13/21. Pt needs to be seen sooner. Please call to discuss further.

## 2021-02-18 ENCOUNTER — TELEPHONE (OUTPATIENT)
Dept: INTERNAL MEDICINE | Age: 61
End: 2021-02-18

## 2021-02-18 DIAGNOSIS — Z71.89 ENCOUNTER TO DISCUSS BREAST RECONSTRUCTION: Primary | ICD-10-CM

## 2021-02-18 NOTE — TELEPHONE ENCOUNTER
Pt would like to try and get in to see Dr Rianna Santizo sooner than 4/13, if she is able to get in. Please call pt and advise if any opening come available. Pt would also like a referral to a breast dr if she is able to.

## 2021-02-19 NOTE — TELEPHONE ENCOUNTER
PC to pt-- spoke with her and let her know that referral was placed- gave her information and phone number she will call to schedule an appt.

## 2021-04-08 DIAGNOSIS — I10 ESSENTIAL HYPERTENSION: Chronic | ICD-10-CM

## 2021-04-08 DIAGNOSIS — E11.9 TYPE 2 DIABETES MELLITUS WITHOUT COMPLICATION, WITHOUT LONG-TERM CURRENT USE OF INSULIN (HCC): Chronic | ICD-10-CM

## 2021-04-09 RX ORDER — SPIRONOLACTONE 25 MG/1
TABLET ORAL
Qty: 30 TABLET | Refills: 0 | OUTPATIENT
Start: 2021-04-09

## 2021-04-09 NOTE — TELEPHONE ENCOUNTER
E-scribing request for amLODIPine and Metformin. Pt has future appt. Spironolactone was filled on 11/16/20 with 11 refills    Health Maintenance   Topic Date Due    COVID-19 Vaccine (1) Never done    DTaP/Tdap/Td vaccine (1 - Tdap) Never done    Shingles Vaccine (1 of 2) Never done    Diabetic retinal exam  10/28/2016    Diabetic foot exam  01/11/2020    Lipid screen  01/11/2020    A1C test (Diabetic or Prediabetic)  10/16/2020    Diabetic microalbuminuria test  10/16/2020    Potassium monitoring  10/16/2020    Creatinine monitoring  10/16/2020    Flu vaccine (Season Ended) 09/01/2021    Breast cancer screen  09/11/2021    Colon cancer screen colonoscopy  05/11/2026    Pneumococcal 0-64 years Vaccine  Completed    Hepatitis C screen  Completed    HIV screen  Completed    Hepatitis A vaccine  Aged Out    Hib vaccine  Aged Out    Meningococcal (ACWY) vaccine  Aged Out             (applicable per patient's age: Cancer Screenings, Depression Screening, Fall Risk Screening, Immunizations)    Hemoglobin A1C (%)   Date Value   01/11/2019 6.9   04/12/2018 6.2   11/28/2017 8.2     Microalb/Crt.  Ratio (mcg/mg creat)   Date Value   10/16/2019 10     LDL Cholesterol (mg/dL)   Date Value   01/11/2019 80     AST (U/L)   Date Value   01/11/2019 11     ALT (U/L)   Date Value   01/11/2019 13     BUN (mg/dL)   Date Value   10/16/2019 27 (H)      (goal A1C is < 7)   (goal LDL is <100) need 30-50% reduction from baseline     BP Readings from Last 3 Encounters:   07/31/20 (!) 155/100   02/19/20 (!) 171/96   12/23/19 (!) 158/90    (goal /80)      All Future Testing planned in [facility]:  Lab Frequency Next Occurrence   CBC with Differential Once 05/68/5031   Basic Metabolic Panel Once 01/30/8641   Iron and TIBC Once 04/14/2021       Next Visit Date:  Future Appointments   Date Time Provider Lucas Martines   4/13/2021 11:00 AM Lucila Beard MD [email or web address]   4/23/2021 11:00 AM Mic Paul MD [facility]

## 2021-04-12 RX ORDER — AMLODIPINE BESYLATE 10 MG/1
TABLET ORAL
Qty: 90 TABLET | Refills: 0 | Status: SHIPPED | OUTPATIENT
Start: 2021-04-12 | End: 2021-04-13 | Stop reason: SDUPTHER

## 2021-04-13 ENCOUNTER — OFFICE VISIT (OUTPATIENT)
Dept: INTERNAL MEDICINE | Age: 61
End: 2021-04-13
Payer: COMMERCIAL

## 2021-04-13 VITALS
SYSTOLIC BLOOD PRESSURE: 144 MMHG | HEART RATE: 77 BPM | DIASTOLIC BLOOD PRESSURE: 87 MMHG | HEIGHT: 68 IN | BODY MASS INDEX: 38.34 KG/M2 | WEIGHT: 253 LBS

## 2021-04-13 DIAGNOSIS — M87.00 AVASCULAR NECROSIS (HCC): ICD-10-CM

## 2021-04-13 DIAGNOSIS — N89.8 VAGINAL DRYNESS: ICD-10-CM

## 2021-04-13 DIAGNOSIS — Z23 NEED FOR PROPHYLACTIC VACCINATION AND INOCULATION AGAINST VARICELLA: ICD-10-CM

## 2021-04-13 DIAGNOSIS — I10 ESSENTIAL HYPERTENSION: Chronic | ICD-10-CM

## 2021-04-13 DIAGNOSIS — E11.9 TYPE 2 DIABETES MELLITUS WITHOUT COMPLICATION, WITHOUT LONG-TERM CURRENT USE OF INSULIN (HCC): ICD-10-CM

## 2021-04-13 DIAGNOSIS — Z23 NEED FOR PROPHYLACTIC VACCINATION AGAINST DIPHTHERIA-TETANUS-PERTUSSIS (DTP): ICD-10-CM

## 2021-04-13 DIAGNOSIS — Z13.220 SCREENING FOR HYPERLIPIDEMIA: Primary | ICD-10-CM

## 2021-04-13 LAB — HBA1C MFR BLD: 7.3 %

## 2021-04-13 PROCEDURE — 90715 TDAP VACCINE 7 YRS/> IM: CPT | Performed by: INTERNAL MEDICINE

## 2021-04-13 PROCEDURE — 99214 OFFICE O/P EST MOD 30 MIN: CPT | Performed by: INTERNAL MEDICINE

## 2021-04-13 PROCEDURE — 3051F HG A1C>EQUAL 7.0%<8.0%: CPT | Performed by: INTERNAL MEDICINE

## 2021-04-13 PROCEDURE — 99211 OFF/OP EST MAY X REQ PHY/QHP: CPT | Performed by: INTERNAL MEDICINE

## 2021-04-13 PROCEDURE — 83036 HEMOGLOBIN GLYCOSYLATED A1C: CPT | Performed by: INTERNAL MEDICINE

## 2021-04-13 RX ORDER — AMLODIPINE BESYLATE 10 MG/1
10 TABLET ORAL DAILY
Qty: 90 TABLET | Refills: 3 | Status: SHIPPED | OUTPATIENT
Start: 2021-04-13 | End: 2022-02-22 | Stop reason: SDUPTHER

## 2021-04-13 RX ORDER — CONJUGATED ESTROGENS 0.62 MG/G
CREAM VAGINAL
Qty: 1 TUBE | Refills: 3 | Status: SHIPPED | OUTPATIENT
Start: 2021-04-13 | End: 2022-02-22 | Stop reason: ALTCHOICE

## 2021-04-13 RX ORDER — SPIRONOLACTONE 50 MG/1
50 TABLET, FILM COATED ORAL DAILY
Qty: 90 TABLET | Refills: 3 | Status: SHIPPED | OUTPATIENT
Start: 2021-04-13 | End: 2022-02-22 | Stop reason: SDUPTHER

## 2021-04-13 SDOH — ECONOMIC STABILITY: TRANSPORTATION INSECURITY
IN THE PAST 12 MONTHS, HAS THE LACK OF TRANSPORTATION KEPT YOU FROM MEDICAL APPOINTMENTS OR FROM GETTING MEDICATIONS?: NOT ASKED

## 2021-04-13 SDOH — ECONOMIC STABILITY: TRANSPORTATION INSECURITY
IN THE PAST 12 MONTHS, HAS LACK OF TRANSPORTATION KEPT YOU FROM MEETINGS, WORK, OR FROM GETTING THINGS NEEDED FOR DAILY LIVING?: NOT ASKED

## 2021-04-13 SDOH — ECONOMIC STABILITY: FOOD INSECURITY: WITHIN THE PAST 12 MONTHS, YOU WORRIED THAT YOUR FOOD WOULD RUN OUT BEFORE YOU GOT MONEY TO BUY MORE.: NEVER TRUE

## 2021-04-13 SDOH — ECONOMIC STABILITY: INCOME INSECURITY: HOW HARD IS IT FOR YOU TO PAY FOR THE VERY BASICS LIKE FOOD, HOUSING, MEDICAL CARE, AND HEATING?: NOT HARD AT ALL

## 2021-04-13 ASSESSMENT — PATIENT HEALTH QUESTIONNAIRE - PHQ9
2. FEELING DOWN, DEPRESSED OR HOPELESS: 0
SUM OF ALL RESPONSES TO PHQ QUESTIONS 1-9: 0
SUM OF ALL RESPONSES TO PHQ QUESTIONS 1-9: 0

## 2021-04-13 NOTE — PROGRESS NOTES
mouth daily 90 tablet 3    conjugated estrogens (PREMARIN) 0.625 MG/GM vaginal cream Apply vaginally twice a day 1 Tube 3    amLODIPine (NORVASC) 10 MG tablet Take 1 tablet by mouth once daily 90 tablet 0    metFORMIN (GLUCOPHAGE) 1000 MG tablet TAKE 1 TABLET BY MOUTH TWICE DAILY WITH MEALS 180 tablet 0    Alcohol Swabs (ALCOHOL PREP) 70 % PADS Use 4 times times per day Diagnosis:  Diabetes Non-Insulin Dependent 100 each 5    glimepiride (AMARYL) 4 MG tablet Take 1 tablet by mouth every morning (before breakfast) 90 tablet 3    benazepril (LOTENSIN) 40 MG tablet TAKE 1 TABLET BY MOUTH ONCE DAILY 90 tablet 1    carvedilol (COREG) 12.5 MG tablet TAKE ONE TABLET BY MOUTH TWICE DAILY WITH MEALS 180 tablet 1    atorvastatin (LIPITOR) 80 MG tablet TAKE 1 TABLET BY MOUTH ONE TIME A DAY 90 tablet 0    loratadine (CLARITIN) 10 MG tablet Take 1 tablet by mouth daily 90 tablet 0    lidocaine 4 % external patch Apply to affected area; leave in place no longer than 12 hrs then remove the patch; use no longer than 12 hrs/day total (Patient not taking: Reported on 4/13/2021) 14 patch 0    blood glucose monitor strips Use 4 times per day Diagnosis:  Diabetes Non-Insulin Dependent 100 strip 5    diclofenac sodium (VOLTAREN) 1 % GEL Apply 4 g topically 4 times daily as needed for Pain (Patient not taking: Reported on 4/13/2021) 100 g 0    blood glucose monitor strips Test__2_times daily    Diagnosis: 250.0   Diabetes Mellitus____Insulin Dependent____Non-Insulin Dependent 100 strip 11    Lancets MISC 1 each by Does not apply route 2 times daily Use___times daily    Diagnisis:250.0  Diabetes Mellitus____Insulin Dependent___Non-Insulin Dependent 100 each 11    [employer] LANCETS THIN 26G MISC USE THREE TIMES A DAY (Patient not taking: Reported on 10/16/2019) 100 each 5     No current facility-administered medications for this visit. Allergies   Allergen Reactions    Other Nausea Only     Shrimp makes pt nauseated. Denies problems with other shellfish or seafood  Shrimp makes pt nauseated. Denies problems with other shellfish or seafood       PHYSICAL EXAM:   Vital Signs:   BP (!) 144/87 (Site: Right Upper Arm, Position: Sitting, Cuff Size: Large Adult)   Pulse 77   Ht 5' 8\" (1.727 m)   Wt 253 lb (114.8 kg)   BMI 38.47 kg/m²     BP Readings from Last 3 Encounters:   04/13/21 (!) 144/87   07/31/20 (!) 155/100   02/19/20 (!) 171/96        Wt Readings from Last 3 Encounters:   04/13/21 253 lb (114.8 kg)   07/31/20 256 lb (116.1 kg)   02/19/20 256 lb (116.1 kg)       Physical Exam  Constitutional:       Appearance: She is obese. Cardiovascular:      Rate and Rhythm: Normal rate and regular rhythm. Pulmonary:      Effort: Pulmonary effort is normal.   Abdominal:      General: Abdomen is flat. Musculoskeletal: Normal range of motion. Skin:     General: Skin is warm. Neurological:      General: No focal deficit present. Mental Status: She is oriented to person, place, and time. Psychiatric:         Mood and Affect: Mood normal.         Behavior: Behavior normal.         Body mass index is 38.47 kg/m².     RESULTS    Lab Findings    CBC:   Lab Results   Component Value Date    WBC 7.8 01/11/2019    HGB 14.1 01/11/2019     01/11/2019     BMP:   Lab Results   Component Value Date     10/16/2019    K 4.4 10/16/2019     10/16/2019    CO2 21 10/16/2019    BUN 27 10/16/2019    CREATININE 1.08 10/16/2019    GLUCOSE 107 10/16/2019    GLUCOSE 111 03/15/2012     HEMOGLOBIN A1C:   Lab Results   Component Value Date    LABA1C 7.3 04/13/2021     MICROALBUMIN URINE:   Lab Results   Component Value Date    MICROALBUR 24 10/16/2019     FASTING LIPID PANEL:   Lab Results   Component Value Date    CHOL 155 01/11/2019    HDL 50 01/11/2019    TRIG 126 01/11/2019     Lab Results   Component Value Date    LDLCHOLESTEROL 80 01/11/2019     LIVER PROFILE:   Lab Results   Component Value Date    ALT 13 01/11/2019    AST 11 01/11/2019    PROT 7.8 01/11/2019    BILITOT <0.10 01/11/2019    LABALBU 4.4 01/11/2019      THYROID FUNCTION:   Lab Results   Component Value Date    TSH 0.86 12/01/2014      URINE ANALYSIS: No results found for: [address] 72    1. Screening for hyperlipidemia    - Lipid, Fasting; Future    2. Need for prophylactic vaccination against diphtheria-tetanus-pertussis (DTP)    - Tdap (age 6y and older) IM (Boostrix)  - IA IMMUNIZ ADMIN,1 SINGLE/COMB VAC/TOXOID    3. Need for prophylactic vaccination and inoculation against varicella    - zoster recombinant adjuvanted vaccine [facility]) 50 MCG/0.5ML SUSR injection; Inject 0.5 mLs into the muscle once for 1 dose 50 MCG IM then repeat 2-6 months. Dispense: 1 each; Refill: 1    4. Essential hypertension    - spironolactone (ALDACTONE) 50 MG tablet; Take 1 tablet by mouth daily  Dispense: 90 tablet; Refill: 3    5. Type 2 diabetes mellitus without complication, without long-term current use of insulin (HCC)    - POCT glycosylated hemoglobin (Hb A1C)  - Microalbumin, Ur; Future  -  DIABETES FOOT EXAM    6. Avascular necrosis (HCC)    - DEXA BONE DENSITY 2 SITES; Future    7. Vaginal dryness    - conjugated estrogens (PREMARIN) 0.625 MG/GM vaginal cream; Apply vaginally twice a day  Dispense: 1 Tube; Refill: 3            Follow up Instructions:    1. Return in about 6 months (around 10/13/2021). 2. Reviewed prior labs and health maintenance. 3. Discussed use, benefit, and side effects of prescribed medications. Barriers to medication compliance addressed. All patient questions answered. Pt voiced understanding.      4. Patient given educational materials - see patient instructions      MD SUDHEER Howe  Attending Physician, [address] [place] [address], Internal Medicine Residency Program  [address] [place] [address]  4/13/2021, 12:11 PM

## 2021-04-13 NOTE — PATIENT INSTRUCTIONS
Patient Education        Tetanus and Diphtheria Booster: Care Instructions  Your Care Instructions     A diphtheria and tetanus (Td) vaccine protects people against diphtheria and tetanus (lockjaw). You need a Td shot every 10 years to help prevent these diseases, which can be fatal.  You may also need a Td booster if you get a puncture wound or dirty cut. A booster is another dose of the vaccine. Young teens get a booster at 6to 15years of age. This booster is called Tdap and includes the vaccine against pertussis (whooping cough). All teens and adults who never had Tdap also need one dose of this vaccine. Common side effects of Td vaccination include soreness in the arm where you got the shot and a mild fever. These usually occur within 3 days of the shot and last a short time. Follow-up care is a key part of your treatment and safety. Be sure to make and go to all appointments, and call your doctor if you are having problems. It's also a good idea to know your test results and keep a list of the medicines you take. How can you care for yourself at home? · Take an over-the-counter pain medicine, such as acetaminophen (Tylenol), ibuprofen (Advil, Motrin), or naproxen (Aleve), if your arm is sore after the shot. Be safe with medicines. Read and follow all instructions on the label. Do not give aspirin to anyone younger than 20. It has been linked to Reye syndrome, a serious illness. · Put ice or a cold pack on the sore area for 10 to 20 minutes at a time. Put a thin cloth between the ice and your skin. When should you call for help? Call 911 anytime you think you may need emergency care. For example, call if:    · You have a seizure.     · You have symptoms of a severe allergic reaction. These may include:  ? Sudden raised, red areas (hives) all over your body. ? Swelling of the throat, mouth, lips, or tongue. ? Trouble breathing. ? Passing out (losing consciousness).  Or you may feel very lightheaded or suddenly feel weak, confused, or restless. Call your doctor now or seek immediate medical care if:    · You have symptoms of an allergic reaction, such as:  ? A rash or hives (raised, red areas on the skin). ? Itching. ? Swelling. ? Belly pain, nausea, or vomiting.     · You have a high fever. Watch closely for changes in your health, and be sure to contact your doctor if you have any problems. Where can you learn more? Go to https://[employer]. org and sign in to your [employer] account. Enter V332 in the [facility] box to learn more about \"Tetanus and Diphtheria Booster: Care Instructions. \"     If you do not have an account, please click on the \"Sign Up Now\" link. Current as of: August 31, 2020               Content Version: 12.8  © 7579-2188 [employer]. Care instructions adapted under license by [facility] If you have questions about a medical condition or this instruction, always ask your healthcare professional. [place] [address] any warranty or liability for your use of this information. Medications e-scribe to pharmacy of pt's choice. Labs given to patient, they will have them done before their next visit. Patient was put on a wait list and will be contacted to schedule their next follow up appointment once the schedule is available. If the patient is in need of an appointment before their next visit please call the office at [phone]. After Visit Summary  given and reviewed.     PETR

## 2021-04-22 ENCOUNTER — OFFICE VISIT (OUTPATIENT)
Dept: SURGERY | Age: 61
End: 2021-04-22
Payer: COMMERCIAL

## 2021-04-22 VITALS
WEIGHT: 255.8 LBS | DIASTOLIC BLOOD PRESSURE: 93 MMHG | HEART RATE: 82 BPM | OXYGEN SATURATION: 99 % | BODY MASS INDEX: 38.77 KG/M2 | HEIGHT: 68 IN | SYSTOLIC BLOOD PRESSURE: 146 MMHG

## 2021-04-22 DIAGNOSIS — Z85.3 HISTORY OF BREAST CANCER: Primary | ICD-10-CM

## 2021-04-22 PROCEDURE — 99203 OFFICE O/P NEW LOW 30 MIN: CPT | Performed by: PLASTIC SURGERY

## 2021-04-22 NOTE — PROGRESS NOTES
BREAST WORKSHEET     Weight: Weight: 255 lb 12.8 oz (116 kg)   Height: Height: 5' 8\" (172.7 cm)    BMI: Body mass index is 38.89 kg/m². Marital Status:        [ ] S       [x ] M       [ ] D        [ ] W  How many pregnancies: 6  Children: 5  Planning to breast feed in the future? no    PATIENT HISTORY  History of Breast Cancer:      [x ] Yes       [ ] No      [ ] Right    [ x] Left     [ ] Bilateral  Last Mammogram: 2020       Where: [facility]  Family History of Breast Cancer? [x ] Yes        [ ] No     Breast Masses:       [ ] Right     [x ] Left     Year: 1999  Breast Biopsies:                 [ ] Right     [x ] Left     Year: 1999  Previous Breast Surgeries:      [x ] Yes       [ ] No       Year:   2000          Type: Lumpectomy  Mastectomy:        [ ] Right     [ ] Left     Year:  Lumpectomy:        [ ] Right     [x ] Left     Year: 2000  Radiation Treatment:       [x ] Yes       [ ] No       Year:      2000         Where[facility]       Doctor? Dr. Navid Brooks, Dr. Ricks Amwillie  Miscellaneous:       [ ] Fibrocystic changes            [ ] Systemic Disease   [ ] Mastalgia                              [x ] Diabetes  Bleeding Problems:        [ ] Yes       [x ] No        Type: Allergies:    Allergies as of 04/22/2021 - Review Complete 04/22/2021   Allergen Reaction Noted    Other Nausea Only 10/12/2012     Other:     PHYSICAL EXAM  Chest Wall        [ ] Pectus Deform      [ ] Scoliosis      [ ] Scars      [ ] Venous Stasis  Asymmetries:         [ ] Right      [ ] Left      [ ] Bilateral  Re-Construction       [ ] Right      [ ] Left      [ ] Bilateral  Tubular:Discussed Options:       [ ] Right      [ ] Left      [ ] Bilateral  Striae:          [ ] Right      [ ] Left      [ ] Bilateral  Other:     NEUROLOGICAL EXAM  Normal:        [ ] Yes       [ ] No           Problem/Explain:   MEASUREMENTS     Chest Circumference in Inches   Above the breast: Chest Circumference in Inches   Bellow the breast: Breast Girth (inches)                                                         Sternal Notch to Nipple RIGHT (cm)   Sternal Notch to Nipple LEFT (cm)   Nipple to IMF RIGHT (cm)   Nipple to IMF LEFT (cm)   Breast Width RIGHT (cm)                                               Breast Width LEFT (cm)                                                 Current bra size:   Desired bra size       Resection Grams for each Size  32-34 = 100g   36-38 = 200g     42/44 = 300g     44-46 = 400g   BSA GRAMS OF TISSUE TO BE REMOVED PER BREAST    1.40-1.50 218-260   1.51-1.60 261-310   1.61-1.70 311-370   1.71-1.80 371-441   1.81-1.90 442-527   1.91-2.00 528-628   2.01-2.10 629-750   2.11-2.20 783-309   2.21-2.30 [phone]   2.31-2.40 7636-5904   2.41-2.50 8500-4326   2.51-2.60 6185-0148   2.61-2.70 5408-8386   2.7-2.80 3375-2376   2.81-2.90 9048-3629   2.91-3.00 7937-0536       Reconstruction options discussed:   [ ] Tram         [ ] Latissimus   dorsi flap        [ ] Tissue Expanders   [ ]Delayed reconstruction          [ ] Free Flap                 [ ] No Reconstruction    I discussed with the patient that the patient is responsible for obtaining a mammogram prior to any surgical intervention if they have not had a mammogram within 1 year of the scheduled procedure.   Refer to:     Breast reduction options discussed:     [ ] Inferior Pedical                [ ] Superior Pedicle  [ ] Medial Pedicle        [ ] Mastopexy

## 2021-04-22 NOTE — PROGRESS NOTES
[facility]  [facility] [address]       History and Physical     Chief Complaint   Patient presents with    New Patient     Patient states she had a lumpectomy 21 years ago, and would like to discuss reconstruction. HPI:   Scot Smith is a 56 y.o. female who presents patient is status post lumpectomy, radiation therapy, and chemotherapy. Patient surgery was in 2000. Patient is here to discuss her reconstructive options. Patient has breast asymmetry.     Medications:     Current Outpatient Medications   Medication Sig Dispense Refill    spironolactone (ALDACTONE) 50 MG tablet Take 1 tablet by mouth daily 90 tablet 3    conjugated estrogens (PREMARIN) 0.625 MG/GM vaginal cream Apply vaginally twice a day 1 Tube 3    amLODIPine (NORVASC) 10 MG tablet Take 1 tablet by mouth daily 90 tablet 3    metFORMIN (GLUCOPHAGE) 1000 MG tablet TAKE 1 TABLET BY MOUTH TWICE DAILY WITH MEALS 180 tablet 0    lidocaine 4 % external patch Apply to affected area; leave in place no longer than 12 hrs then remove the patch; use no longer than 12 hrs/day total (Patient not taking: Reported on 4/13/2021) 14 patch 0    blood glucose monitor strips Use 4 times per day Diagnosis:  Diabetes Non-Insulin Dependent 100 strip 5    Alcohol Swabs (ALCOHOL PREP) 70 % PADS Use 4 times times per day Diagnosis:  Diabetes Non-Insulin Dependent 100 each 5    glimepiride (AMARYL) 4 MG tablet Take 1 tablet by mouth every morning (before breakfast) 90 tablet 3    benazepril (LOTENSIN) 40 MG tablet TAKE 1 TABLET BY MOUTH ONCE DAILY 90 tablet 1    carvedilol (COREG) 12.5 MG tablet TAKE ONE TABLET BY MOUTH TWICE DAILY WITH MEALS 180 tablet 1    atorvastatin (LIPITOR) 80 MG tablet TAKE 1 TABLET BY MOUTH ONE TIME A DAY 90 tablet 0    loratadine (CLARITIN) 10 MG tablet Take 1 tablet by mouth daily 90 tablet 0    diclofenac sodium (VOLTAREN) 1 % GEL Apply 4 g topically 4 times daily as needed for Pain (Patient not taking: Reported on 4/13/2021) 100 g 0    blood glucose monitor strips Test__2_times daily    Diagnosis: 250.0   Diabetes Mellitus____Insulin Dependent____Non-Insulin Dependent 100 strip 11    Lancets MISC 1 each by Does not apply route 2 times daily Use___times daily    Diagnisis:250.0  Diabetes Mellitus____Insulin Dependent___Non-Insulin Dependent 100 each 11    [employer] LANCETS THIN 26G MISC USE THREE TIMES A DAY (Patient not taking: Reported on 10/16/2019) 100 each 5     No current facility-administered medications for this visit. Allergies: Allergies   Allergen Reactions    Other Nausea Only     Shrimp makes pt nauseated. Denies problems with other shellfish or seafood  Shrimp makes pt nauseated. Denies problems with other shellfish or seafood     Review of Systems:   Constitutional: Negative for fever, chills, fatigue and unexpected weight change. HENT: Patient has allergic rhinitis. Eyes: Negative for pain and discharge. Respiratory: Negative for cough and shortness of breath. Cardiovascular: Patient has high cholesterol. Gastrointestinal: Negative for nausea, vomiting, diarrhea and constipation. Skin: Negative for pallor and rash. Neurological: Patient has neuropathy. Hematological: Does not bruise/bleed easily. Psychiatric/Behavioral: Negative for behavioral problems. The patient is not nervous/anxious. Breast: Patient has a left lumpectomy.   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    DM (diabetes mellitus) ([facility]     High cholesterol     Hip pain, bilateral     History of anemia     Hypertension     Neuropathy     Snores     Type II or unspecified type diabetes mellitus without mention of complication, not stated as uncontrolled     Uterine fibroid      Past Surgical History:   Procedure Laterality Date    ANKLE FRACTURE SURGERY Right     BREAST LUMPECTOMY Left 2000    COLONOSCOPY      at [place], were negative as per patient    HYSTERECTOMY  2000    secondary to fibroids    PARTIAL NEPHRECTOMY      left in childhood, eventually complete     UPPER GASTROINTESTINAL ENDOSCOPY      years ago     Social History     Socioeconomic History    Marital status:      Spouse name: Not on file    Number of children: Not on file    Years of education: Not on file    Highest education level: Not on file   Occupational History    Not on file   Social Needs    Financial resource strain: Not hard at all   Kudo insecurity     Worry: Never true     Inability: Never true   [employer] needs     Medical: Not on file     Non-medical: Not on file   Tobacco Use    Smoking status: Never Smoker    Smokeless tobacco: Never Used   Substance and Sexual Activity    Alcohol use: No     Alcohol/week: 0.0 standard drinks    Drug use: No    Sexual activity: Yes     Partners: Male   Lifestyle    Physical activity     Days per week: Not on file     Minutes per session: Not on file    Stress: Not on file   Relationships    Social connections     Talks on phone: Not on file     Gets together: Not on file     Attends Worship service: Not on file     Active member of club or organization: Not on file     Attends meetings of clubs or organizations: Not on file     Relationship status: Not on file    Intimate partner violence     Fear of current or ex partner: Not on file     Emotionally abused: Not on file     Physically abused: Not on file     Forced sexual activity: Not on file   Other Topics Concern    Not on file   Social History Narrative    Not on file     Family History   Problem Relation Age of Onset    Diabetes Mother     High Blood Pressure Mother     Heart Disease Father     Cancer Father     Colon Cancer Paternal Aunt      Physical Exam:   BP (!) 147/90 (Site: Right Upper Arm, Position: Sitting, Cuff Size: Large Adult)   Pulse 82   Ht 5' 8\" (1.727 m)   Wt 255 lb 12.8 oz (116 kg)   SpO2 99%   BMI 38.89 kg/m²    Body mass index is 38.89 kg/m². Physical Exam   Nursing note and vitals reviewed. Constitutional: Oriented to person, place, and time. Appears well-developed and well-nourished. No distress. Head: Normocephalic and atraumatic. Eyes: Conjunctivae and EOM are normal.   Pulmonary/Chest: Effort normal. No respiratory distress. Neurological: Alert and oriented to person, place, and time. Skin: Radiation changes to the left breast.   Psychiatric: Normal mood and affect. Behavior is normal  Breast: Radiation changes to the left breast, loss of the nipple and areolar complex. Clinically it appears to be more of a skin sparing mastectomy. Imaging:   @[address]@        Impression/Plan:      Diagnosis Orders   1. History of breast cancer       Patient Active Problem List   Diagnosis    Seasonal allergies    HTN (hypertension)    DM (diabetes mellitus)    Dyslipidemia    Solitary kidney    Cancer of breast ([address]    Hip pain, bilateral    Neuropathy due to secondary diabetes ([address]    Postcoital bleeding secondary to vaginal atrophy    Somatic dysfunction of spine, thoracic    Somatic dysfunction of spine, lumbar    History of hysterectomy for benign disease (fibroids)    History of lumpectomy of left breast    Absence of both cervix and uterus, acquired    Vaginal atrophy       Plan:  Patient with a history of breast cancer. Patient status post a large lumpectomy with the loss of nipple areolar complex. Patient has a ptotic right breast.  Patient would like to have closer symmetry. I discussed with her that implant-based reconstruction in a radiated field is fraught with complication. I recommended free flap.   I discussed with her I do not do free flaps. I will refer her to the [facility] for evaluation for free flap.      Electronically signed by:  Vivianne Leventhal, MD 4/22/2021

## 2021-04-22 NOTE — LETTER
[facility]  [address] [place] [address]  Phone: [phone]  Fax: [phone]    Ari Aldana MD        April 22, 2021       Patient: Kulwinder Caro   MR Number: [id number]   YOB: 1960   Date of Visit: 4/22/2021       Dear Dr. Moise Moraes:    Thank you for the request for consultation for Ryan Torres to me for the evaluation of breast reconstruction. Below are the relevant portions of my assessment and plan of care. Plan:  Patient with a history of breast cancer. Patient status post a large lumpectomy with the loss of nipple areolar complex. Patient has a ptotic right breast.  Patient would like to have closer symmetry. I discussed with her that implant-based reconstruction in a radiated field is fraught with complication. I recommended free flap. I discussed with her I do not do free flaps. I will refer her to the [facility], [facility] clinic for evaluation for free flap. If you have questions, please do not hesitate to call me. I look forward to following Kodi Soliman along with you.     Sincerely,        Ramya Huddleston MD    CC providers:  Rco Gray MD  [facility] [address]  [address] [place]

## 2021-04-23 ENCOUNTER — TELEPHONE (OUTPATIENT)
Dept: SURGERY | Age: 61
End: 2021-04-23

## 2021-04-23 NOTE — TELEPHONE ENCOUNTER
Patient needs to be referred to [facility] for Free Flap breast surgery which Dr. Berniece Rinne does not do. I have asked Dr. Berniece Rinne to put in referral to [facility] so that you can get this patient set up.

## 2021-04-26 DIAGNOSIS — Z85.3 HISTORY OF BREAST CANCER: Primary | ICD-10-CM

## 2021-04-26 NOTE — TELEPHONE ENCOUNTER
I placed a referral to the [facility], [facility] clinic.

## 2021-04-27 ENCOUNTER — TELEPHONE (OUTPATIENT)
Dept: SURGERY | Age: 61
End: 2021-04-27

## 2021-05-03 NOTE — TELEPHONE ENCOUNTER
Please call [address] and see if they take her insurance.   If they do I will be happy to send a referral.

## 2021-05-06 ENCOUNTER — TELEPHONE (OUTPATIENT)
Dept: SURGERY | Age: 61
End: 2021-05-06

## 2021-05-06 NOTE — TELEPHONE ENCOUNTER
I called [facility]. They said that they do take this patient's insurance. If a new referral is put in to her chart, I can fax it to them at [phone]. They said that once the referral has been received, it could take about 1 week for the patient to be called to set up an appt.

## 2021-05-06 NOTE — TELEPHONE ENCOUNTER
Patient asking for a call back from Dr. Tawana Oppenheim clinical regarding a call for an appt she was supposed to have gotten from the [facility].

## 2021-05-07 DIAGNOSIS — Z90.12 ACQUIRED ABSENCE OF LEFT BREAST: Primary | ICD-10-CM

## 2021-05-07 DIAGNOSIS — Z85.3 HISTORY OF BREAST CANCER: ICD-10-CM

## 2021-05-13 ENCOUNTER — TELEPHONE (OUTPATIENT)
Dept: SURGERY | Age: 61
End: 2021-05-13

## 2021-05-13 NOTE — TELEPHONE ENCOUNTER
Pt was referred to [facility] by Dr Tyrone Conklin. She has never received a call.  Pt states it has been a few weeks

## 2021-05-21 ENCOUNTER — TELEPHONE (OUTPATIENT)
Dept: INTERNAL MEDICINE | Age: 61
End: 2021-05-21

## 2021-05-21 NOTE — LETTER
[address]  [address] [address]  Phone: [phone]  Fax: [phone]    Lia Cordoba MD        May 21, 2021    [address] [place] [address] [place] [address]      Dear Odin Blind: This letter is a reminder that you may have diagnostic testing that has not been completed. It is important to your well-being that these test(s) are performed. Please find the outstanding test(s) attached. If you could please have these completed before your next appointment. You can have the test completed at any [facility] facility or Lab. Please see the order for scheduling instructions. Any testing that needs completed other than blood work or xray's please call [phone] to schedule an appointment. Otherwise can be done at any [facility]. Please call our office at Dept: [phone] for additional information on the outstanding tests or let us know if they have been completed so we may update your chart. If you have any questions or concerns, please don't hesitate to call.     Sincerely,        Lia Cordoba MD

## 2021-08-02 DIAGNOSIS — I10 ESSENTIAL HYPERTENSION: Chronic | ICD-10-CM

## 2021-08-02 DIAGNOSIS — E11.9 TYPE 2 DIABETES MELLITUS WITHOUT COMPLICATION, WITHOUT LONG-TERM CURRENT USE OF INSULIN (HCC): Chronic | ICD-10-CM

## 2021-08-02 DIAGNOSIS — E78.5 DYSLIPIDEMIA: Chronic | ICD-10-CM

## 2021-08-03 RX ORDER — ATORVASTATIN CALCIUM 80 MG/1
TABLET, FILM COATED ORAL
Qty: 90 TABLET | Refills: 0 | Status: SHIPPED | OUTPATIENT
Start: 2021-08-03 | End: 2021-11-01 | Stop reason: SDUPTHER

## 2021-08-03 RX ORDER — CARVEDILOL 12.5 MG/1
TABLET ORAL
Qty: 180 TABLET | Refills: 0 | Status: SHIPPED | OUTPATIENT
Start: 2021-08-03 | End: 2021-11-01 | Stop reason: SDUPTHER

## 2021-08-03 RX ORDER — BENAZEPRIL HYDROCHLORIDE 40 MG/1
TABLET, FILM COATED ORAL
Qty: 90 TABLET | Refills: 0 | Status: SHIPPED | OUTPATIENT
Start: 2021-08-03 | End: 2021-12-07

## 2021-08-03 NOTE — TELEPHONE ENCOUNTER
Request for 4 medications. Next Visit Date: Patient is on the waitlist for October, Writer tried to call the patient to get her scheduled but was unable to LM due to VM box full. No future appointments. Health Maintenance   Topic Date Due    COVID-19 Vaccine (1) Never done    Shingles Vaccine (1 of 2) Never done    Diabetic retinal exam  10/28/2016    Lipid screen  01/11/2020    Diabetic microalbuminuria test  10/16/2020    Potassium monitoring  10/16/2020    Creatinine monitoring  10/16/2020    Flu vaccine (1) 09/01/2021    Breast cancer screen  09/11/2021    Diabetic foot exam  04/13/2022    A1C test (Diabetic or Prediabetic)  04/13/2022    Pneumococcal 0-64 years Vaccine (2 of 2 - PPSV23) 09/01/2023    Colon cancer screen colonoscopy  05/11/2026    DTaP/Tdap/Td vaccine (2 - Td or Tdap) 04/13/2031    Hepatitis C screen  Completed    HIV screen  Completed    Hepatitis A vaccine  Aged Out    Hib vaccine  Aged Out    Meningococcal (ACWY) vaccine  Aged Out       Hemoglobin A1C (%)   Date Value   04/13/2021 7.3   01/11/2019 6.9   04/12/2018 6.2             ( goal A1C is < 7)   Microalb/Crt.  Ratio (mcg/mg creat)   Date Value   10/16/2019 10     LDL Cholesterol (mg/dL)   Date Value   01/11/2019 80       (goal LDL is <100)   AST (U/L)   Date Value   01/11/2019 11     ALT (U/L)   Date Value   01/11/2019 13     BUN (mg/dL)   Date Value   10/16/2019 27 (H)     BP Readings from Last 3 Encounters:   04/22/21 (!) 146/93   04/13/21 (!) 144/87   07/31/20 (!) 155/100          (goal 120/80)    All Future Testing planned in [facility]  Lab Frequency Next Occurrence   Microalbumin, Ur Once 08/21/2021   Lipid, Fasting Once 08/21/2021   DEXA BONE DENSITY 2 SITES Once 10/21/2021         Patient Active Problem List:     Seasonal allergies     HTN (hypertension)     DM (diabetes mellitus)     Dyslipidemia     Solitary kidney     Cancer of breast (HCC)     Hip pain, bilateral     Neuropathy due to secondary diabetes ([facility]     Postcoital bleeding secondary to vaginal atrophy     Somatic dysfunction of spine, thoracic     Somatic dysfunction of spine, lumbar     History of hysterectomy for benign disease (fibroids)     History of lumpectomy of left breast     Absence of both cervix and uterus, acquired     Vaginal atrophy

## 2021-08-30 ENCOUNTER — TELEPHONE (OUTPATIENT)
Dept: INTERNAL MEDICINE | Age: 61
End: 2021-08-30

## 2021-08-30 NOTE — LETTER
[address] [address]  [address] [address]  Phone: [phone]  Fax: [phone]    Vladimir Figueredo MD        August 30, 2021    [address] [place] [address] [place] [address]      Dear Flossie Hamman: This letter is a reminder that you may have diagnostic testing that has not been completed. It is important to your well-being that these test(s) are performed. Please find the outstanding test(s) attached. If you could please have these completed before your next appointment. You can have the test completed at any [facility] facility or Lab. Please see the order for scheduling instructions. Any testing that needs completed other than blood work or xray's please call [phone] to schedule an appointment. Otherwise can be done at any [facility]. Please call our office at Dept: [phone] for additional information on the outstanding tests or let us know if they have been completed so we may update your chart. If you have any questions or concerns, please don't hesitate to call.     Sincerely,        Vladimir Figueredo MD

## 2021-08-30 NOTE — TELEPHONE ENCOUNTER
Letter to patient to inform them of Bloodwork that need to be completed before next visit.
patient was critically ill...

## 2021-09-03 ENCOUNTER — TELEPHONE (OUTPATIENT)
Dept: INTERNAL MEDICINE | Age: 61
End: 2021-09-03

## 2021-09-03 NOTE — TELEPHONE ENCOUNTER
----- Message from Bre Weir sent at 9/2/2021  3:58 PM EDT -----  Subject: Appointment Request    Reason for Call: Routine Physical Exam    QUESTIONS  Type of Appointment? Established Patient  Reason for appointment request? No appointments available during search  Additional Information for Provider? Patient requesting appt for a   physical and mammogram order pt requesting a Friday around 9/24 appt with   HAKAN Carlton   ---------------------------------------------------------------------------  --------------  Tobar Keraderm  What is the best way for the office to contact you? OK to leave message on   voicemail  Preferred Call Back Phone Number? [phone]  ---------------------------------------------------------------------------  --------------  SCRIPT ANSWERS  Relationship to Patient? Self  (If the patient has Medicare as their primary insurance coverage ask this   question) Are you requesting a Medicare Annual Wellness Visit? No  (Is the patient requesting a pap smear with their physical exam?)? No  (Is the patient requesting their annual physical and does not need PAP or   AWV per above?)? Yes   Have you been diagnosed with, awaiting test results for, or told that you   are suspected of having COVID-19 (Coronavirus)? (If patient has tested   negative or was tested as a requirement for work, school, or travel and   not based on symptoms, answer no)? No  Do you currently have flu-like symptoms including fever or chills, cough,   shortness of breath, difficulty breathing, or new loss of taste or smell? No  Have you had close contact with someone with COVID-19 in the last 14 days? No  (Service Expert  click yes below to proceed with Youn[facility]q As Usual   Scheduling)?  Yes

## 2021-10-18 ENCOUNTER — TELEPHONE (OUTPATIENT)
Dept: FAMILY MEDICINE CLINIC | Age: 61
End: 2021-10-18

## 2021-10-18 NOTE — TELEPHONE ENCOUNTER
Unable to left voicemail please let pt know that she can call [phone] to check on referral status

## 2021-10-18 NOTE — TELEPHONE ENCOUNTER
patient states Dr Germania Alejandre referred her to [facility] to have a Breast Reconstruction with Flap and a haven't gotten a called regarding the referral.

## 2021-11-01 ENCOUNTER — HOSPITAL ENCOUNTER (OUTPATIENT)
Age: 61
Setting detail: SPECIMEN
Discharge: HOME | End: 2021-11-01
Payer: COMMERCIAL

## 2021-11-01 ENCOUNTER — OFFICE VISIT (OUTPATIENT)
Dept: INTERNAL MEDICINE | Age: 61
End: 2021-11-01
Payer: COMMERCIAL

## 2021-11-01 VITALS
HEART RATE: 83 BPM | SYSTOLIC BLOOD PRESSURE: 142 MMHG | BODY MASS INDEX: 37.99 KG/M2 | HEIGHT: 69 IN | DIASTOLIC BLOOD PRESSURE: 99 MMHG

## 2021-11-01 DIAGNOSIS — Z23 NEEDS FLU SHOT: ICD-10-CM

## 2021-11-01 DIAGNOSIS — Z13.220 SCREENING FOR HYPERLIPIDEMIA: ICD-10-CM

## 2021-11-01 DIAGNOSIS — E78.5 DYSLIPIDEMIA: Chronic | ICD-10-CM

## 2021-11-01 DIAGNOSIS — E11.9 TYPE 2 DIABETES MELLITUS WITHOUT COMPLICATION, WITHOUT LONG-TERM CURRENT USE OF INSULIN (HCC): ICD-10-CM

## 2021-11-01 DIAGNOSIS — I10 ESSENTIAL HYPERTENSION: Chronic | ICD-10-CM

## 2021-11-01 DIAGNOSIS — R09.81 NASAL CONGESTION: ICD-10-CM

## 2021-11-01 DIAGNOSIS — Z12.31 ENCOUNTER FOR SCREENING MAMMOGRAM FOR BREAST CANCER: ICD-10-CM

## 2021-11-01 DIAGNOSIS — E11.9 TYPE 2 DIABETES MELLITUS WITHOUT COMPLICATION, WITHOUT LONG-TERM CURRENT USE OF INSULIN (HCC): Primary | ICD-10-CM

## 2021-11-01 DIAGNOSIS — Z12.31 BREAST CANCER SCREENING BY MAMMOGRAM: ICD-10-CM

## 2021-11-01 DIAGNOSIS — G47.33 OSA (OBSTRUCTIVE SLEEP APNEA): ICD-10-CM

## 2021-11-01 DIAGNOSIS — Z23 NEED FOR PROPHYLACTIC VACCINATION AND INOCULATION AGAINST VARICELLA: ICD-10-CM

## 2021-11-01 LAB
ABSOLUTE EOS #: 0.22 K/UL (ref 0–0.44)
ABSOLUTE IMMATURE GRANULOCYTE: <0.03 K/UL (ref 0–0.3)
ABSOLUTE LYMPH #: 2.52 K/UL (ref 1.1–3.7)
ABSOLUTE MONO #: 1.02 K/UL (ref 0.1–1.2)
ANION GAP SERPL CALCULATED.3IONS-SCNC: 18 MMOL/L (ref 9–17)
BASOPHILS # BLD: 1 % (ref 0–2)
BASOPHILS ABSOLUTE: 0.04 K/UL (ref 0–0.2)
BUN BLDV-MCNC: 20 MG/DL (ref 8–23)
BUN/CREAT BLD: ABNORMAL (ref 9–20)
CALCIUM SERPL-MCNC: 9.7 MG/DL (ref 8.6–10.4)
CHLORIDE BLD-SCNC: 105 MMOL/L (ref 98–107)
CHOLESTEROL, FASTING: 151 MG/DL
CHOLESTEROL/HDL RATIO: 2.8
CO2: 20 MMOL/L (ref 20–31)
CREAT SERPL-MCNC: 0.89 MG/DL (ref 0.5–0.9)
CREATININE URINE: 145.4 MG/DL (ref 28–217)
DIFFERENTIAL TYPE: ABNORMAL
EOSINOPHILS RELATIVE PERCENT: 3 % (ref 1–4)
GFR AFRICAN AMERICAN: >60 ML/MIN
GFR NON-AFRICAN AMERICAN: >60 ML/MIN
GFR SERPL CREATININE-BSD FRML MDRD: ABNORMAL ML/MIN/{1.73_M2}
GFR SERPL CREATININE-BSD FRML MDRD: ABNORMAL ML/MIN/{1.73_M2}
GLUCOSE BLD-MCNC: 149 MG/DL (ref 70–99)
HBA1C MFR BLD: 7.6 %
HCT VFR BLD CALC: 42.4 % (ref 36.3–47.1)
HDLC SERPL-MCNC: 54 MG/DL
HEMOGLOBIN: 13 G/DL (ref 11.9–15.1)
IMMATURE GRANULOCYTES: 0 %
LDL CHOLESTEROL: 85 MG/DL (ref 0–130)
LYMPHOCYTES # BLD: 34 % (ref 24–43)
MCH RBC QN AUTO: 29.5 PG (ref 25.2–33.5)
MCHC RBC AUTO-ENTMCNC: 30.7 G/DL (ref 28.4–34.8)
MCV RBC AUTO: 96.4 FL (ref 82.6–102.9)
MICROALBUMIN/CREAT 24H UR: 36 MG/L
MICROALBUMIN/CREAT UR-RTO: 25 MCG/MG CREAT
MONOCYTES # BLD: 14 % (ref 3–12)
NRBC AUTOMATED: 0 PER 100 WBC
PDW BLD-RTO: 13.6 % (ref 11.8–14.4)
PLATELET # BLD: 365 K/UL (ref 138–453)
PLATELET ESTIMATE: ABNORMAL
PMV BLD AUTO: 10.3 FL (ref 8.1–13.5)
POTASSIUM SERPL-SCNC: 3.8 MMOL/L (ref 3.7–5.3)
RBC # BLD: 4.4 M/UL (ref 3.95–5.11)
RBC # BLD: ABNORMAL 10*6/UL
SEG NEUTROPHILS: 48 % (ref 36–65)
SEGMENTED NEUTROPHILS ABSOLUTE COUNT: 3.55 K/UL (ref 1.5–8.1)
SODIUM BLD-SCNC: 143 MMOL/L (ref 135–144)
TRIGLYCERIDE, FASTING: 61 MG/DL
VITAMIN D 25-HYDROXY: 13.2 NG/ML (ref 30–100)
VLDLC SERPL CALC-MCNC: NORMAL MG/DL (ref 1–30)
WBC # BLD: 7.4 K/UL (ref 3.5–11.3)
WBC # BLD: ABNORMAL 10*3/UL

## 2021-11-01 PROCEDURE — 99214 OFFICE O/P EST MOD 30 MIN: CPT | Performed by: INTERNAL MEDICINE

## 2021-11-01 PROCEDURE — 3051F HG A1C>EQUAL 7.0%<8.0%: CPT | Performed by: INTERNAL MEDICINE

## 2021-11-01 PROCEDURE — 99211 OFF/OP EST MAY X REQ PHY/QHP: CPT | Performed by: INTERNAL MEDICINE

## 2021-11-01 PROCEDURE — 90686 IIV4 VACC NO PRSV 0.5 ML IM: CPT | Performed by: INTERNAL MEDICINE

## 2021-11-01 PROCEDURE — 83036 HEMOGLOBIN GLYCOSYLATED A1C: CPT | Performed by: INTERNAL MEDICINE

## 2021-11-01 RX ORDER — ANTACID TABLETS 648 MG/1
2 TABLET, CHEWABLE ORAL DAILY
Qty: 500 TABLET | Refills: 1 | Status: SHIPPED | OUTPATIENT
Start: 2021-11-01 | End: 2022-02-22 | Stop reason: SDUPTHER

## 2021-11-01 RX ORDER — GLIMEPIRIDE 4 MG/1
4 TABLET ORAL
Qty: 90 TABLET | Refills: 3 | Status: SHIPPED | OUTPATIENT
Start: 2021-11-01

## 2021-11-01 RX ORDER — GLUCOSAMINE HCL/CHONDROITIN SU 500-400 MG
CAPSULE ORAL
Qty: 100 STRIP | Refills: 5 | Status: SHIPPED | OUTPATIENT
Start: 2021-11-01

## 2021-11-01 RX ORDER — MEDICAL SUPPLY, MISCELLANEOUS
1 EACH MISCELLANEOUS DAILY
Qty: 1 EACH | Refills: 0 | Status: SHIPPED | OUTPATIENT
Start: 2021-11-01

## 2021-11-01 RX ORDER — FLUTICASONE PROPIONATE 50 MCG
2 SPRAY, SUSPENSION (ML) NASAL DAILY
Qty: 1 EACH | Refills: 3 | Status: SHIPPED | OUTPATIENT
Start: 2021-11-01 | End: 2022-02-22 | Stop reason: SDUPTHER

## 2021-11-01 RX ORDER — CARVEDILOL 12.5 MG/1
TABLET ORAL
Qty: 180 TABLET | Refills: 0 | Status: SHIPPED | OUTPATIENT
Start: 2021-11-01 | End: 2022-02-22 | Stop reason: SDUPTHER

## 2021-11-01 RX ORDER — ATORVASTATIN CALCIUM 80 MG/1
TABLET, FILM COATED ORAL
Qty: 90 TABLET | Refills: 0 | Status: SHIPPED | OUTPATIENT
Start: 2021-11-01 | End: 2022-02-22 | Stop reason: SDUPTHER

## 2021-11-01 NOTE — PROGRESS NOTES
[facility]      Date of Patient's Visit: 11/1/2021    Progress note    Patient Care Team:  Magnolia Last MD as PCP - General (Internal Medicine)  Magnolia Last MD as PCP - [address] [place] [address] Dr Vargas Provider      CHIEF COMPLAINT  Chief Complaint   Patient presents with    Diabetes    Hypertension    Health Maintenance     A1c runing, RIP pended, labs pended, shingles pended, eye exam due       SUBJECTIVE  Yanni Malin is a 59 y.o. female who presents for f/u on chronic medical problems     Ho breast cancer s/p left lumpectomy 20 years ago : not on armidex s/p radiation and chemo : she f/u with Dr Jensen Crocker for breast reconstruction surgery    Hip pain : with evidence of AVN : dexa scan pending     Ankle soreness : she is on her feet all day . No associated welling , no creptius or sings of arthritis     Hypertension : elevated today : she feels very tired and is having a headache , she just finished her shift from work     + NITO screening         ROS  All other review of systems negative, except for those noted.     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    DM (diabetes mellitus) ([address]     High cholesterol     Hip pain, bilateral     History of anemia     Hypertension     Neuropathy     Snores     Type II or unspecified type diabetes mellitus without mention of complication, not stated as uncontrolled     Uterine fibroid        Past Surgical History:   Procedure Laterality Date    ANKLE FRACTURE SURGERY Right     BREAST LUMPECTOMY Left 2000    COLONOSCOPY      at [place], were negative as per patient    HYSTERECTOMY  2000    secondary to fibroids    PARTIAL NEPHRECTOMY      left in childhood, eventually complete     UPPER GASTROINTESTINAL ENDOSCOPY      years ago       Family History   Problem Relation Age of Onset    Diabetes Mother     High Blood Pressure Mother     Heart Disease Father     Cancer Father     Colon Cancer Paternal Aunt        Social History     Socioeconomic History    Marital status:      Spouse name: None    Number of children: None    Years of education: None    Highest education level: None   Occupational History    None   Tobacco Use    Smoking status: Never Smoker    Smokeless tobacco: Never Used   Substance and Sexual Activity    Alcohol use: No     Alcohol/week: 0.0 standard drinks    Drug use: No    Sexual activity: Yes     Partners: Male   Other Topics Concern    None   Social History Narrative    None     Social Determinants of Health     Financial Resource Strain: Low Risk     Difficulty of Paying Living Expenses: Not hard at all   Food Insecurity: No Food Insecurity    Worried About Running Out of Food in the Last Year: Never true    [address] in the Last Year: Never true   Transportation Needs:     Lack of Transportation (Medical):      Lack of Transportation (Non-Medical):    Physical Activity:     Days of Exercise per Week:     Minutes of Exercise per Session:    Stress:     Feeling of Stress :    Social Connections:     Frequency of Communication with Friends and Family:     Frequency of Social Gatherings with Friends and Family:     Attends Sikhism Services:     Active Member of Clubs or Organizations:     Attends Club or Organization Meetings:     Marital Status:    Intimate Partner Violence:     Fear of Current or Ex-Partner:     Emotionally Abused:     Physically Abused:     Sexually Abused:        Current Outpatient Medications   Medication Sig Dispense Refill    atorvastatin (LIPITOR) 80 MG tablet Take 1 tablet by mouth once daily 90 tablet 0    blood glucose monitor strips Use 4 times per day Diagnosis:  Diabetes Non-Insulin Dependent 100 strip 5    carvedilol (COREG) 12.5 MG tablet TAKE 1 TABLET BY MOUTH TWICE DAILY WITH MEALS 180 tablet 0    glimepiride (AMARYL) 4 MG tablet Take 1 tablet by mouth every morning (before breakfast) 90 tablet 3    zoster recombinant adjuvanted vaccine (SHINGRIX) 50 MCG/0.5ML SUSR injection Inject 0.5 mLs into the muscle once for 1 dose 50 MCG IM then repeat 2-6 months.  1 each 1    Blood Pressure Monitoring (B-D ASSURE BPM/AUTO ARM CUFF) MISC 1 Device by Does not apply route daily 1 each 0    calcium carbonate 648 MG TABS Take 2 tablets by mouth daily 500 tablet 1    fluticasone (FLONASE) 50 MCG/ACT nasal spray 2 sprays by Each Nostril route daily 1 each 3    benazepril (LOTENSIN) 40 MG tablet Take 1 tablet by mouth once daily 90 tablet 0    metFORMIN (GLUCOPHAGE) 1000 MG tablet TAKE 1 TABLET BY MOUTH TWICE DAILY WITH MEALS 180 tablet 0    spironolactone (ALDACTONE) 50 MG tablet Take 1 tablet by mouth daily 90 tablet 3    conjugated estrogens (PREMARIN) 0.625 MG/GM vaginal cream Apply vaginally twice a day 1 Tube 3    amLODIPine (NORVASC) 10 MG tablet Take 1 tablet by mouth daily 90 tablet 3    Alcohol Swabs (ALCOHOL PREP) 70 % PADS Use 4 times times per day Diagnosis:  Diabetes Non-Insulin Dependent 100 each 5    Lancets MISC 1 each by Does not apply route 2 times daily Use___times daily    Diagnisis:250.0  Diabetes Mellitus____Insulin Dependent___Non-Insulin Dependent 100 each 11    lidocaine 4 % external patch Apply to affected area; leave in place no longer than 12 hrs then remove the patch; use no longer than 12 hrs/day total (Patient not taking: Reported on 4/13/2021) 14 patch 0    loratadine (CLARITIN) 10 MG tablet Take 1 tablet by mouth daily (Patient not taking: Reported on 11/1/2021) 90 tablet 0    diclofenac sodium (VOLTAREN) 1 % GEL Apply 4 g topically 4 times daily as needed for Pain (Patient not taking: Reported on 4/13/2021) 100 g 0     No current facility-administered medications for this visit. Allergies   Allergen Reactions    Other Nausea Only     Shrimp makes pt nauseated. Denies problems with other shellfish or seafood  Shrimp makes pt nauseated. Denies problems with other shellfish or seafood       PHYSICAL EXAM:   Vital Signs:   BP (!) 142/99   Pulse 83   Ht 5' 8.8\" (1.748 m)   BMI 37.99 kg/m²     BP Readings from Last 3 Encounters:   11/01/21 (!) 142/99   04/22/21 (!) 146/93   04/13/21 (!) 144/87        Wt Readings from Last 3 Encounters:   04/22/21 255 lb 12.8 oz (116 kg)   04/13/21 253 lb (114.8 kg)   07/31/20 256 lb (116.1 kg)       Physical Exam  Constitutional:       Appearance: She is obese. Cardiovascular:      Rate and Rhythm: Normal rate. Pulses: Normal pulses. Heart sounds: Normal heart sounds. Abdominal:      General: Abdomen is flat. Musculoskeletal:         General: Normal range of motion. Neurological:      General: No focal deficit present. Mental Status: She is alert and oriented to person, place, and time. Psychiatric:         Mood and Affect: Mood normal.         Body mass index is 37.99 kg/m².     RESULTS    Lab Findings    CBC:   Lab Results   Component Value Date    WBC 7.8 01/11/2019    HGB 14.1 01/11/2019     01/11/2019     BMP:   Lab Results   Component Value Date     10/16/2019    K 4.4 10/16/2019     10/16/2019    CO2 21 10/16/2019    BUN 27 10/16/2019    CREATININE 1.08 10/16/2019    GLUCOSE 107 10/16/2019    GLUCOSE 111 03/15/2012     HEMOGLOBIN A1C:   Lab Results   Component Value Date    LABA1C 7.6 11/01/2021     MICROALBUMIN URINE:   Lab Results   Component Value Date    MICROALBUR 24 10/16/2019     FASTING LIPID PANEL:   Lab Results   Component Value Date    CHOL 155 01/11/2019    HDL 50 01/11/2019    TRIG 126 01/11/2019     Lab Results   Component Value Date    LDLCHOLESTEROL 80 01/11/2019     LIVER PROFILE:   Lab Results   Component Value Date    ALT 13 01/11/2019    AST 11 01/11/2019    PROT 7.8 01/11/2019    BILITOT <0.10 01/11/2019    LABALBU 4.4 01/11/2019      THYROID FUNCTION:   Lab Results   Component Value Date    TSH 0.86 12/01/2014      URINE ANALYSIS: No results found for: Tristian Philip    1. Type 2 diabetes mellitus without complication, without long-term current use of insulin (HCC)    - POCT glycosylated hemoglobin (Hb A1C)  - Basic Metabolic Panel; Future  - blood glucose monitor strips; Use 4 times per day Diagnosis:  Diabetes Non-Insulin Dependent  Dispense: 100 strip; Refill: 5  - glimepiride (AMARYL) 4 MG tablet; Take 1 tablet by mouth every morning (before breakfast)  Dispense: 90 tablet; Refill: 3  - CBC With Auto Differential; Future    2. Encounter for screening mammogram for breast cancer    - [facility] Digital Screen Bilateral [[id number]]; Future    3. Dyslipidemia    - atorvastatin (LIPITOR) 80 MG tablet; Take 1 tablet by mouth once daily  Dispense: 90 tablet; Refill: 0    4. Essential hypertension    - carvedilol (COREG) 12.5 MG tablet; TAKE 1 TABLET BY MOUTH TWICE DAILY WITH MEALS  Dispense: 180 tablet; Refill: 0    5. Needs flu shot    - NE IMMUNIZ ADMIN,1 SINGLE/COMB VAC/TOXOID  - INFLUENZA, QUADV, 3 YRS AND OLDER, IM PF, PREFILL SYR OR SDV, 0.5ML (AFLURIA QUADV, PF)    6. Need for prophylactic vaccination and inoculation against varicella    - zoster recombinant adjuvanted vaccine [facility]) 50 MCG/0.5ML SUSR injection; Inject 0.5 mLs into the muscle once for 1 dose 50 MCG IM then repeat 2-6 months. Dispense: 1 each; Refill: 1    7. Breast cancer screening by mammogram    - Vitamin D 25 Hydroxy; Future    8. NITO (obstructive sleep apnea)    - Baseline Diagnostic Sleep Study; Future  - Sleep Study with PAP Titration; Future    9. Nasal congestion  - fluticasone (FLONASE) 50 MCG/ACT nasal spray; 2 sprays by Each Nostril route daily  Dispense: 1 each;  Refill: 3    Plan :   Exercises for hip , back   dexa scan  Cbc for dyspnea on exertion           Follow up Instructions:    1. Return in about 3 months (around 2/1/2022). 2. Reviewed prior labs and health maintenance. 3. Discussed use, benefit, and side effects of prescribed medications. Barriers to medication compliance addressed. All patient questions answered. Pt voiced understanding.      4. Patient given educational materials - see patient instructions      MD SUDHEER Gonsales  Attending Physician, [address] [place] [address], Internal Medicine Residency Program  [address] [place] [address]  11/1/2021, 11:38 AM

## 2021-11-02 RX ORDER — ERGOCALCIFEROL 1.25 MG/1
50000 CAPSULE ORAL WEEKLY
Qty: 12 CAPSULE | Refills: 0 | Status: SHIPPED | OUTPATIENT
Start: 2021-11-02 | End: 2022-02-22 | Stop reason: SDUPTHER

## 2021-11-05 ENCOUNTER — TELEPHONE (OUTPATIENT)
Dept: INTERNAL MEDICINE | Age: 61
End: 2021-11-05

## 2021-11-05 NOTE — LETTER
[facility] [address]  [address] [address]  Phone: [phone]  Fax: [phone]    Lorna Iraheta MD        November 5, 2021    [address] [place] [address] [place] [address]      Dear Walker Maldonado: This letter is a reminder that you may have diagnostic testing that has not been completed. It is important to your well-being that these test(s) are performed. Please find the outstanding test(s) attached. If you could please have these completed before your next appointment. You can have the test completed at any [facility] facility or Lab. Please see the order for scheduling instructions. Any testing that needs completed other than blood work or xray's please call [phone] to schedule an appointment. Otherwise can be done at any [facility]. Please call our office at Dept: [phone] for additional information on the outstanding tests or let us know if they have been completed so we may update your chart. If you have any questions or concerns, please don't hesitate to call.     Sincerely,        Lorna Iraheta MD

## 2021-12-05 DIAGNOSIS — E11.9 TYPE 2 DIABETES MELLITUS WITHOUT COMPLICATION, WITHOUT LONG-TERM CURRENT USE OF INSULIN (HCC): Chronic | ICD-10-CM

## 2021-12-05 DIAGNOSIS — I10 ESSENTIAL HYPERTENSION: Chronic | ICD-10-CM

## 2021-12-06 NOTE — TELEPHONE ENCOUNTER
Refill request for Metformin and Lotensin . If appropriate please send medication(s) to patients pharmacy. Next appt: 2/1/2022      Health Maintenance   Topic Date Due    Shingles Vaccine (1 of 2) Never done    Diabetic retinal exam  10/28/2016    Breast cancer screen  09/11/2021    COVID-19 Vaccine (3 - Booster for Pfizer series) 01/26/2022    Diabetic foot exam  04/13/2022    A1C test (Diabetic or Prediabetic)  11/01/2022    Diabetic microalbuminuria test  11/01/2022    Lipid screen  11/01/2022    Potassium monitoring  11/01/2022    Creatinine monitoring  11/01/2022    Pneumococcal 0-64 years Vaccine (2 of 2 - PPSV23) 09/01/2023    Colon cancer screen colonoscopy  05/11/2026    DTaP/Tdap/Td vaccine (2 - Td or Tdap) 04/13/2031    Flu vaccine  Completed    Hepatitis C screen  Completed    HIV screen  Completed    Hepatitis A vaccine  Aged Out    Hib vaccine  Aged Out    Meningococcal (ACWY) vaccine  Aged Out       Hemoglobin A1C (%)   Date Value   11/01/2021 7.6   04/13/2021 7.3   01/11/2019 6.9             ( goal A1C is < 7)   Microalb/Crt.  Ratio (mcg/mg creat)   Date Value   11/01/2021 25 (H)     LDL Cholesterol (mg/dL)   Date Value   11/01/2021 85       (goal LDL is <100)   AST (U/L)   Date Value   01/11/2019 11     ALT (U/L)   Date Value   01/11/2019 13     BUN (mg/dL)   Date Value   11/01/2021 20     BP Readings from Last 3 Encounters:   11/01/21 (!) 142/99   04/22/21 (!) 146/93   04/13/21 (!) 144/87          (goal 120/80)          Patient Active Problem List:     Seasonal allergies     HTN (hypertension)     DM (diabetes mellitus)     Dyslipidemia     Solitary kidney     Cancer of breast (HCC)     Hip pain, bilateral     Neuropathy due to secondary diabetes ([facility]     Postcoital bleeding secondary to vaginal atrophy     Somatic dysfunction of spine, thoracic     Somatic dysfunction of spine, lumbar     History of hysterectomy for benign disease (fibroids)     History of lumpectomy of left breast     Absence of both cervix and uterus, acquired     Vaginal atrophy

## 2021-12-07 RX ORDER — BENAZEPRIL HYDROCHLORIDE 40 MG/1
TABLET, FILM COATED ORAL
Qty: 90 TABLET | Refills: 0 | Status: SHIPPED | OUTPATIENT
Start: 2021-12-07 | End: 2022-02-22 | Stop reason: SDUPTHER

## 2021-12-21 ENCOUNTER — HOSPITAL ENCOUNTER (OUTPATIENT)
Dept: MAMMOGRAPHY | Age: 61
Discharge: HOME | End: 2021-12-23
Payer: COMMERCIAL

## 2021-12-21 DIAGNOSIS — Z12.31 ENCOUNTER FOR SCREENING MAMMOGRAM FOR BREAST CANCER: ICD-10-CM

## 2021-12-21 PROCEDURE — 77063 BREAST TOMOSYNTHESIS BI: CPT

## 2022-02-03 DIAGNOSIS — E11.9 TYPE 2 DIABETES MELLITUS WITHOUT COMPLICATION, WITHOUT LONG-TERM CURRENT USE OF INSULIN (HCC): Chronic | ICD-10-CM

## 2022-02-07 NOTE — TELEPHONE ENCOUNTER
E-scribing request for metFORMIN . Pt has future appt. Health Maintenance   Topic Date Due    Shingles Vaccine (1 of 2) Never done    Diabetic retinal exam  10/28/2016    COVID-19 Vaccine (3 - Booster for Pfizer series) 12/26/2021    Diabetic foot exam  04/13/2022    Depression Screen  04/13/2022    A1C test (Diabetic or Prediabetic)  11/01/2022    Diabetic microalbuminuria test  11/01/2022    Lipid screen  11/01/2022    Potassium monitoring  11/01/2022    Creatinine monitoring  11/01/2022    Breast cancer screen  12/21/2022    Pneumococcal 0-64 years Vaccine (2 of 2 - PPSV23) 09/01/2023    Colon cancer screen colonoscopy  05/11/2026    DTaP/Tdap/Td vaccine (2 - Td or Tdap) 04/13/2031    Flu vaccine  Completed    Hepatitis C screen  Completed    HIV screen  Completed    Hepatitis A vaccine  Aged Out    Hib vaccine  Aged Out    Meningococcal (ACWY) vaccine  Aged Out             (applicable per patient's age: Cancer Screenings, Depression Screening, Fall Risk Screening, Immunizations)    Hemoglobin A1C (%)   Date Value   11/01/2021 7.6   04/13/2021 7.3   01/11/2019 6.9     Microalb/Crt.  Ratio (mcg/mg creat)   Date Value   11/01/2021 25 (H)     LDL Cholesterol (mg/dL)   Date Value   11/01/2021 85     AST (U/L)   Date Value   01/11/2019 11     ALT (U/L)   Date Value   01/11/2019 13     BUN (mg/dL)   Date Value   11/01/2021 20      (goal A1C is < 7)   (goal LDL is <100) need 30-50% reduction from baseline     BP Readings from Last 3 Encounters:   11/01/21 (!) 142/99   04/22/21 (!) 146/93   04/13/21 (!) 144/87    (goal /80)      All Future Testing planned in [facility]:  Lab Frequency Next Occurrence   DEXA BONE DENSITY 2 SITES Once 02/05/2022   Baseline Diagnostic Sleep Study Once 11/01/2021   Sleep Study with PAP Titration Once 11/01/2021       Next Visit Date:  Future Appointments   Date Time Provider Lucas Martines   2/15/2022  9:00 AM Porfirio Morgan MD [address] [place] [address] [address] Patient Active Problem List:     Seasonal allergies     HTN (hypertension)     DM (diabetes mellitus)     Dyslipidemia     Solitary kidney     Cancer of breast (HCC)     Hip pain, bilateral     Neuropathy due to secondary diabetes ([facility]     Postcoital bleeding secondary to vaginal atrophy     Somatic dysfunction of spine, thoracic     Somatic dysfunction of spine, lumbar     History of hysterectomy for benign disease (fibroids)     History of lumpectomy of left breast     Absence of both cervix and uterus, acquired     Vaginal atrophy

## 2022-02-22 ENCOUNTER — OFFICE VISIT (OUTPATIENT)
Dept: INTERNAL MEDICINE | Age: 62
End: 2022-02-22
Payer: COMMERCIAL

## 2022-02-22 ENCOUNTER — TELEPHONE (OUTPATIENT)
Dept: INTERNAL MEDICINE | Age: 62
End: 2022-02-22

## 2022-02-22 VITALS
OXYGEN SATURATION: 98 % | HEIGHT: 69 IN | WEIGHT: 245 LBS | DIASTOLIC BLOOD PRESSURE: 96 MMHG | SYSTOLIC BLOOD PRESSURE: 166 MMHG | HEART RATE: 80 BPM | BODY MASS INDEX: 36.29 KG/M2 | TEMPERATURE: 97.7 F

## 2022-02-22 DIAGNOSIS — E78.5 DYSLIPIDEMIA: Chronic | ICD-10-CM

## 2022-02-22 DIAGNOSIS — M25.572 LEFT ANKLE PAIN, UNSPECIFIED CHRONICITY: ICD-10-CM

## 2022-02-22 DIAGNOSIS — R09.81 NASAL CONGESTION: ICD-10-CM

## 2022-02-22 DIAGNOSIS — E11.9 TYPE 2 DIABETES MELLITUS WITHOUT COMPLICATION, WITHOUT LONG-TERM CURRENT USE OF INSULIN (HCC): Chronic | ICD-10-CM

## 2022-02-22 DIAGNOSIS — I10 ESSENTIAL HYPERTENSION: Chronic | ICD-10-CM

## 2022-02-22 DIAGNOSIS — M81.0 SENILE OSTEOPOROSIS: Primary | ICD-10-CM

## 2022-02-22 DIAGNOSIS — E55.9 VITAMIN D DEFICIENCY: Primary | ICD-10-CM

## 2022-02-22 PROCEDURE — 99214 OFFICE O/P EST MOD 30 MIN: CPT | Performed by: INTERNAL MEDICINE

## 2022-02-22 RX ORDER — UBIQUINOL 100 MG
CAPSULE ORAL
Qty: 100 EACH | Refills: 5 | Status: SHIPPED | OUTPATIENT
Start: 2022-02-22

## 2022-02-22 RX ORDER — FLUTICASONE PROPIONATE 50 MCG
2 SPRAY, SUSPENSION (ML) NASAL DAILY
Qty: 1 EACH | Refills: 3 | Status: SHIPPED | OUTPATIENT
Start: 2022-02-22 | End: 2022-03-24

## 2022-02-22 RX ORDER — LORATADINE 10 MG/1
10 TABLET ORAL DAILY
Qty: 90 TABLET | Refills: 0 | Status: SHIPPED | OUTPATIENT
Start: 2022-02-22 | End: 2022-09-19

## 2022-02-22 RX ORDER — SPIRONOLACTONE 50 MG/1
50 TABLET, FILM COATED ORAL DAILY
Qty: 90 TABLET | Refills: 3 | Status: SHIPPED | OUTPATIENT
Start: 2022-02-22

## 2022-02-22 RX ORDER — HYDROCHLOROTHIAZIDE 25 MG/1
25 TABLET ORAL EVERY MORNING
Qty: 90 TABLET | Refills: 1 | Status: SHIPPED | OUTPATIENT
Start: 2022-02-22

## 2022-02-22 RX ORDER — AMLODIPINE BESYLATE 10 MG/1
10 TABLET ORAL DAILY
Qty: 90 TABLET | Refills: 3 | Status: SHIPPED | OUTPATIENT
Start: 2022-02-22 | End: 2022-09-19

## 2022-02-22 RX ORDER — ERGOCALCIFEROL 1.25 MG/1
50000 CAPSULE ORAL WEEKLY
Qty: 12 CAPSULE | Refills: 0 | Status: SHIPPED | OUTPATIENT
Start: 2022-02-22 | End: 2022-02-22 | Stop reason: ALTCHOICE

## 2022-02-22 RX ORDER — ATORVASTATIN CALCIUM 80 MG/1
TABLET, FILM COATED ORAL
Qty: 90 TABLET | Refills: 0 | Status: SHIPPED | OUTPATIENT
Start: 2022-02-22 | End: 2022-09-19

## 2022-02-22 RX ORDER — METRONIDAZOLE 7.5 MG/G
GEL VAGINAL 2 TIMES DAILY
Qty: 1 EACH | Refills: 3 | Status: SHIPPED | OUTPATIENT
Start: 2022-02-22

## 2022-02-22 RX ORDER — CARVEDILOL 12.5 MG/1
TABLET ORAL
Qty: 180 TABLET | Refills: 0 | Status: SHIPPED | OUTPATIENT
Start: 2022-02-22 | End: 2022-09-19

## 2022-02-22 RX ORDER — BENAZEPRIL HYDROCHLORIDE 40 MG/1
40 TABLET, FILM COATED ORAL DAILY
Qty: 90 TABLET | Refills: 0 | Status: SHIPPED | OUTPATIENT
Start: 2022-02-22

## 2022-02-22 RX ORDER — ANTACID TABLETS 648 MG/1
2 TABLET, CHEWABLE ORAL DAILY
Qty: 500 TABLET | Refills: 1 | Status: SHIPPED | OUTPATIENT
Start: 2022-02-22 | End: 2023-02-22

## 2022-02-22 RX ORDER — LIDOCAINE 4 G/G
PATCH TOPICAL
Qty: 14 PATCH | Refills: 0 | Status: SHIPPED | OUTPATIENT
Start: 2022-02-22

## 2022-02-22 ASSESSMENT — PATIENT HEALTH QUESTIONNAIRE - PHQ9
SUM OF ALL RESPONSES TO PHQ QUESTIONS 1-9: 0
SUM OF ALL RESPONSES TO PHQ9 QUESTIONS 1 & 2: 0
SUM OF ALL RESPONSES TO PHQ QUESTIONS 1-9: 0
SUM OF ALL RESPONSES TO PHQ QUESTIONS 1-9: 0
1. LITTLE INTEREST OR PLEASURE IN DOING THINGS: 0
2. FEELING DOWN, DEPRESSED OR HOPELESS: 0
SUM OF ALL RESPONSES TO PHQ QUESTIONS 1-9: 0

## 2022-02-22 NOTE — PROGRESS NOTES
[facility]      Date of Patient's Visit: 2/22/2022    Progress note    Patient Care Team:  Silvana Magdaleno MD as PCP - General (Internal Medicine)  Silvana Magdaleno MD as PCP - [facility]      CHIEF COMPLAINT  Chief Complaint   Patient presents with    Hypertension     medications pended    Health Maintenance     reprinted dexa, due for eye exam        SUBJECTIVE  Aureliano Matute is a 59 y.o. female who presents for post ER visit for cough   She has h/o cough for 1 month which progressively got worse yesterday   She describes the cough as rattling, + expectoration with mild chills. She felt congested   According to her the ER Physicians advised her to get admitted for an inpatient cardiology evaluation. She however refused to do so. No medications were changed or added on discharge   The CXR was normal as were all the labs except mild elevation of BNP     Today she feels that her cough has resolved spontaneously and completely   She feels well and has no active complaints     Uncontrolled hypertension with pedal edema and loud P2 : sleep study is still pending   Possible LVH. ( ekg not avaialble)     DM : well controlled     Cancer screening : Ho breast cancer s/p left lumpectomy 20 years ago : not on armidex s/p radiation and chemo : she f/u with Dr Soumya Bosch for breast reconstruction surgery   last mammogram was normal   Up to date with colonoscopy   Has surgically absent cervix per OB gyn exam notes     Hip pain : with evidence of AVN : dexa scan pending      Ankle soreness : she is on her feet all day . No associated welling , no creptius or sings of arthritis            ROS  All other review of systems negative, except for those noted.     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    [facility]     High cholesterol     Hip pain, bilateral     History of anemia     Hypertension     Neuropathy     Snores     Type II or unspecified type diabetes mellitus without mention of complication, not stated as uncontrolled     Uterine fibroid        Past Surgical History:   Procedure Laterality Date    ANKLE FRACTURE SURGERY Right     BREAST LUMPECTOMY Left 2000    COLONOSCOPY      at [place], were negative as per patient    HYSTERECTOMY  2000    secondary to fibroids    PARTIAL NEPHRECTOMY      left in childhood, eventually complete     UPPER GASTROINTESTINAL ENDOSCOPY      years ago       Family History   Problem Relation Age of Onset    Diabetes Mother     High Blood Pressure Mother     Heart Disease Father     Cancer Father     Colon Cancer Paternal Aunt     Breast Cancer Paternal Aunt     Breast Cancer Paternal Grandmother        Social History     Socioeconomic History    Marital status:      Spouse name: None    Number of children: None    Years of education: None    Highest education level: None   Occupational History    None   Tobacco Use    Smoking status: Never Smoker    Smokeless tobacco: Never Used   Substance and Sexual Activity    Alcohol use: No     Alcohol/week: 0.0 standard drinks    Drug use: No    Sexual activity: Yes     Partners: Male   Other Topics Concern    None   Social History Narrative    None     Social Determinants of Health     Financial Resource Strain: Low Risk     Difficulty of Paying Living Expenses: Not hard at all   Food Insecurity: No Food Insecurity    Worried About Running Out of Food in the Last Year: Never true    Aurelia of Food in the Last Year: Never true   Transportation Needs:     Lack of Transportation (Medical): Not on file    Lack of Transportation (Non-Medical):  Not on file   Physical Activity:     Days of Exercise per Week: Not on file    Minutes of Exercise per Session: Not on file   Stress:     Feeling of Stress : Not on file   Social Connections:     Frequency of Communication with Friends and Family: Not on file    Frequency of Social Gatherings with Friends and Family: Not on file    Attends Mandaeism Services: Not on file    Active Member of [address] [place] [address] or Organizations: Not on file    Attends Club or Organization Meetings: Not on file    Marital Status: Not on file   Intimate Partner Violence:     Fear of Current or Ex-Partner: Not on file    Emotionally Abused: Not on file    Physically Abused: Not on file    Sexually Abused: Not on file   Housing Stability:     Unable to Pay for Housing in the Last Year: Not on file    Number of [place] in the Last Year: Not on file    Unstable Housing in the Last Year: Not on file       Current Outpatient Medications   Medication Sig Dispense Refill    benazepril (LOTENSIN) 40 MG tablet Take 1 tablet by mouth daily 90 tablet 0    atorvastatin (LIPITOR) 80 MG tablet Take 1 tablet by mouth once daily 90 tablet 0    carvedilol (COREG) 12.5 MG tablet TAKE 1 TABLET BY MOUTH TWICE DAILY WITH MEALS 180 tablet 0    spironolactone (ALDACTONE) 50 MG tablet Take 1 tablet by mouth daily 90 tablet 3    amLODIPine (NORVASC) 10 MG tablet Take 1 tablet by mouth daily 90 tablet 3    vitamin D (ERGOCALCIFEROL) 1.25 MG (31533 UT) CAPS capsule Take 1 capsule by mouth once a week 12 capsule 0    calcium carbonate 648 MG TABS Take 2 tablets by mouth daily 500 tablet 1    fluticasone (FLONASE) 50 MCG/ACT nasal spray 2 sprays by Each Nostril route daily 1 each 3    lidocaine 4 % external patch Apply to affected area; leave in place no longer than 12 hrs then remove the patch; use no longer than 12 hrs/day total 14 patch 0    loratadine (CLARITIN) 10 MG tablet Take 1 tablet by mouth daily 90 tablet 0    Alcohol Swabs (ALCOHOL PREP) 70 % PADS Use 4 times times per day Diagnosis:  Diabetes Non-Insulin Dependent 100 each 5    hydroCHLOROthiazide (HYDRODIURIL) 25 MG tablet Take 1 tablet by mouth every morning 90 tablet 1    metFORMIN (GLUCOPHAGE) 1000 MG tablet TAKE 1 TABLET BY MOUTH TWICE DAILY WITH MEALS 180 tablet 0    blood glucose monitor strips Use 4 times per day Diagnosis:  Diabetes Non-Insulin Dependent 100 strip 5    glimepiride (AMARYL) 4 MG tablet Take 1 tablet by mouth every morning (before breakfast) 90 tablet 3    diclofenac sodium (VOLTAREN) 1 % GEL Apply 4 g topically 4 times daily as needed for Pain 100 g 0    Lancets MISC 1 each by Does not apply route 2 times daily Use___times daily    Diagnisis:250.0  Diabetes Mellitus____Insulin Dependent___Non-Insulin Dependent 100 each 11    Blood Pressure Monitoring (B-D ASSURE BPM/AUTO ARM CUFF) MISC 1 Device by Does not apply route daily (Patient not taking: Reported on 2/22/2022) 1 each 0     No current facility-administered medications for this visit. Allergies   Allergen Reactions    Other Nausea Only     Shrimp makes pt nauseated. Denies problems with other shellfish or seafood  Shrimp makes pt nauseated. Denies problems with other shellfish or seafood       PHYSICAL EXAM:   Vital Signs:   BP (!) 166/96   Pulse 80   Temp 97.7 °F (36.5 °C) (Infrared)   Ht 5' 8.5\" (1.74 m)   Wt 245 lb (111.1 kg)   SpO2 98%   BMI 36.71 kg/m²     BP Readings from Last 3 Encounters:   02/22/22 (!) 166/96   11/01/21 (!) 142/99   04/22/21 (!) 146/93        Wt Readings from Last 3 Encounters:   02/22/22 245 lb (111.1 kg)   04/22/21 255 lb 12.8 oz (116 kg)   04/13/21 253 lb (114.8 kg)       Physical Exam  Cardiovascular:      Rate and Rhythm: Normal rate and regular rhythm. Pulses: Normal pulses. Heart sounds: Normal heart sounds. Pulmonary:      Effort: Pulmonary effort is normal.   Abdominal:      General: Abdomen is flat. Musculoskeletal:         General: Swelling present. Normal range of motion. Skin:     General: Skin is warm. sprays by Each Nostril route daily  Dispense: 1 each; Refill: 3    4. Left ankle pain, unspecified chronicity    - lidocaine 4 % external patch; Apply to affected area; leave in place no longer than 12 hrs then remove the patch; use no longer than 12 hrs/day total  Dispense: 14 patch; Refill: 0    5. Type 2 diabetes mellitus without complication, without long-term current use of insulin (HCC)    - Alcohol Swabs (ALCOHOL PREP) 70 % PADS; Use 4 times times per day Diagnosis:  Diabetes Non-Insulin Dependent  Dispense: 100 each; Refill: 5  - hydroCHLOROthiazide (HYDRODIURIL) 25 MG tablet; Take 1 tablet by mouth every morning  Dispense: 90 tablet; Refill: 1    6. Vitamin D deficiency    - vitamin D (ERGOCALCIFEROL) 1.25 MG (20137 UT) CAPS capsule; Take 1 capsule by mouth once a week  Dispense: 12 capsule; Refill: 0  - calcium carbonate 648 MG TABS; Take 2 tablets by mouth daily  Dispense: 500 tablet; Refill: 1        plan : Added HCTZ for uncontrolled hypertension and mild elevation in BNP   Advised compliance with sleep study, exdrcizes    Follow up Instructions:    1. Return in about 4 months (around 6/22/2022). 2. Reviewed prior labs and health maintenance. 3. Discussed use, benefit, and side effects of prescribed medications. Barriers to medication compliance addressed. All patient questions answered. Pt voiced understanding.      4. Patient given educational materials - see patient instructions      MD SUDHEER Murdock  Attending Physician, [address] [place] [address], Internal Medicine Residency Program  [address] [place] [address]  2/22/2022, 10:39 AM

## 2022-02-22 NOTE — TELEPHONE ENCOUNTER
Jean Warren from  Scheduling Dept. called to say that the CD code M87.00 is not payable and a new order is needed .  Please advise

## 2022-02-22 NOTE — PATIENT INSTRUCTIONS
Medications e-scribe to pharmacy of pt's choice. Pt was added to wait list for 4 months. An After Visit Summary was printed and given to the patient.   CB

## 2022-06-14 ENCOUNTER — APPOINTMENT (OUTPATIENT)
Dept: GENERAL RADIOLOGY | Age: 62
End: 2022-06-14
Payer: COMMERCIAL

## 2022-06-14 ENCOUNTER — HOSPITAL ENCOUNTER (EMERGENCY)
Age: 62
Discharge: HOME | End: 2022-06-14
Attending: EMERGENCY MEDICINE
Payer: COMMERCIAL

## 2022-06-14 ENCOUNTER — TELEPHONE (OUTPATIENT)
Dept: INTERNAL MEDICINE | Age: 62
End: 2022-06-14

## 2022-06-14 VITALS
HEIGHT: 68 IN | OXYGEN SATURATION: 97 % | RESPIRATION RATE: 16 BRPM | WEIGHT: 250 LBS | DIASTOLIC BLOOD PRESSURE: 95 MMHG | BODY MASS INDEX: 37.89 KG/M2 | HEART RATE: 82 BPM | TEMPERATURE: 98.4 F | SYSTOLIC BLOOD PRESSURE: 152 MMHG

## 2022-06-14 DIAGNOSIS — J45.21 MILD INTERMITTENT ASTHMA WITH EXACERBATION: Primary | ICD-10-CM

## 2022-06-14 DIAGNOSIS — U07.1 COVID-19: ICD-10-CM

## 2022-06-14 LAB
ABSOLUTE EOS #: 0.17 K/UL (ref 0–0.44)
ABSOLUTE IMMATURE GRANULOCYTE: 0.04 K/UL (ref 0–0.3)
ABSOLUTE LYMPH #: 1.46 K/UL (ref 1.1–3.7)
ABSOLUTE MONO #: 1.13 K/UL (ref 0.1–1.2)
ANION GAP SERPL CALCULATED.3IONS-SCNC: 12 MMOL/L (ref 9–17)
BASOPHILS # BLD: 1 % (ref 0–2)
BASOPHILS ABSOLUTE: 0.03 K/UL (ref 0–0.2)
BUN BLDV-MCNC: 27 MG/DL (ref 8–23)
CALCIUM SERPL-MCNC: 9.6 MG/DL (ref 8.6–10.4)
CHLORIDE BLD-SCNC: 101 MMOL/L (ref 98–107)
CO2: 23 MMOL/L (ref 20–31)
CREAT SERPL-MCNC: 0.89 MG/DL (ref 0.5–0.9)
EKG ATRIAL RATE: 85 BPM
EKG P AXIS: 53 DEGREES
EKG P-R INTERVAL: 160 MS
EKG Q-T INTERVAL: 372 MS
EKG QRS DURATION: 80 MS
EKG QTC CALCULATION (BAZETT): 442 MS
EKG R AXIS: -14 DEGREES
EKG T AXIS: 105 DEGREES
EKG VENTRICULAR RATE: 85 BPM
EOSINOPHILS RELATIVE PERCENT: 3 % (ref 1–4)
GFR AFRICAN AMERICAN: >60 ML/MIN
GFR NON-AFRICAN AMERICAN: >60 ML/MIN
GFR SERPL CREATININE-BSD FRML MDRD: ABNORMAL ML/MIN/{1.73_M2}
GLUCOSE BLD-MCNC: 182 MG/DL (ref 70–99)
HCT VFR BLD CALC: 41.4 % (ref 36.3–47.1)
HEMOGLOBIN: 13.4 G/DL (ref 11.9–15.1)
IMMATURE GRANULOCYTES: 1 %
LYMPHOCYTES # BLD: 24 % (ref 24–43)
MCH RBC QN AUTO: 30.3 PG (ref 25.2–33.5)
MCHC RBC AUTO-ENTMCNC: 32.4 G/DL (ref 28.4–34.8)
MCV RBC AUTO: 93.7 FL (ref 82.6–102.9)
MONOCYTES # BLD: 18 % (ref 3–12)
NRBC AUTOMATED: 0 PER 100 WBC
PDW BLD-RTO: 14.6 % (ref 11.8–14.4)
PLATELET # BLD: 323 K/UL (ref 138–453)
PMV BLD AUTO: 10.1 FL (ref 8.1–13.5)
POTASSIUM SERPL-SCNC: 4 MMOL/L (ref 3.7–5.3)
PRO-BNP: 88 PG/ML
RBC # BLD: 4.42 M/UL (ref 3.95–5.11)
RBC # BLD: ABNORMAL 10*6/UL
SARS-COV-2, RAPID: DETECTED
SEG NEUTROPHILS: 53 % (ref 36–65)
SEGMENTED NEUTROPHILS ABSOLUTE COUNT: 3.38 K/UL (ref 1.5–8.1)
SODIUM BLD-SCNC: 136 MMOL/L (ref 135–144)
SPECIMEN DESCRIPTION: ABNORMAL
TROPONIN, HIGH SENSITIVITY: 6 NG/L (ref 0–14)
TROPONIN, HIGH SENSITIVITY: 7 NG/L (ref 0–14)
WBC # BLD: 6.2 K/UL (ref 3.5–11.3)

## 2022-06-14 PROCEDURE — 83880 ASSAY OF NATRIURETIC PEPTIDE: CPT

## 2022-06-14 PROCEDURE — 87635 SARS-COV-2 COVID-19 AMP PRB: CPT

## 2022-06-14 PROCEDURE — 84484 ASSAY OF TROPONIN QUANT: CPT

## 2022-06-14 PROCEDURE — 85025 COMPLETE CBC W/AUTO DIFF WBC: CPT

## 2022-06-14 PROCEDURE — 93005 ELECTROCARDIOGRAM TRACING: CPT | Performed by: STUDENT IN AN ORGANIZED HEALTH CARE EDUCATION/TRAINING PROGRAM

## 2022-06-14 PROCEDURE — 71045 X-RAY EXAM CHEST 1 VIEW: CPT

## 2022-06-14 PROCEDURE — 80048 BASIC METABOLIC PNL TOTAL CA: CPT

## 2022-06-14 PROCEDURE — 93010 ELECTROCARDIOGRAM REPORT: CPT | Performed by: INTERNAL MEDICINE

## 2022-06-14 PROCEDURE — 99285 EMERGENCY DEPT VISIT HI MDM: CPT

## 2022-06-14 PROCEDURE — 6370000000 HC RX 637 (ALT 250 FOR IP): Performed by: STUDENT IN AN ORGANIZED HEALTH CARE EDUCATION/TRAINING PROGRAM

## 2022-06-14 RX ORDER — ACETAMINOPHEN 500 MG
1000 TABLET ORAL 3 TIMES DAILY
Qty: 24 TABLET | Refills: 0 | Status: SHIPPED | OUTPATIENT
Start: 2022-06-14 | End: 2022-06-18

## 2022-06-14 RX ORDER — PREDNISONE 50 MG/1
50 TABLET ORAL DAILY
Qty: 5 TABLET | Refills: 0 | Status: SHIPPED | OUTPATIENT
Start: 2022-06-14 | End: 2022-06-19

## 2022-06-14 RX ORDER — PREDNISONE 20 MG/1
60 TABLET ORAL ONCE
Status: COMPLETED | OUTPATIENT
Start: 2022-06-14 | End: 2022-06-14

## 2022-06-14 RX ADMIN — PREDNISONE 60 MG: 20 TABLET ORAL at 03:30

## 2022-06-14 ASSESSMENT — ENCOUNTER SYMPTOMS
RHINORRHEA: 0
VOMITING: 0
DIARRHEA: 0
ABDOMINAL DISTENTION: 0
CONSTIPATION: 0
PHOTOPHOBIA: 0
COUGH: 1
CHEST TIGHTNESS: 0
NAUSEA: 0
SORE THROAT: 0
ANAL BLEEDING: 0
SHORTNESS OF BREATH: 1

## 2022-06-14 ASSESSMENT — PAIN SCALES - GENERAL: PAINLEVEL_OUTOF10: 3

## 2022-06-14 ASSESSMENT — PAIN DESCRIPTION - LOCATION: LOCATION: CHEST

## 2022-06-14 NOTE — Clinical Note
Maggy Dhillon was seen and treated in our emergency department on 6/14/2022. She may return to work on 06/20/2022. COVID+     If you have any questions or concerns, please don't hesitate to call.       Jacqueline Menendez, DO

## 2022-06-14 NOTE — LETTER
[facility] [address]  [address] [address]  Phone: [phone]  Fax: [phone]    Toma Che MD        June 14, 2022     [address] [facility] [address]      Dear Heather Sutherland: We missed seeing you for a scheduled appointment at [address] [place] [address] with Toma Che MD on 6/13/2022. We're sorry you were unable to keep your appointment and hope that you are doing well. We ask that you please call 24 hours in advance if you are unable to make your appointment, so that we can give that time to another patient in need. We care about you and the management of your healthcare and want to make sure that you follow up as recommended. To provide quality care and timely appointments to all our patients, you may be dismissed from the practice if you do not show for three (3) scheduled appointments within a 12-month period. We would like to continue treating your healthcare needs. Please call the office to reschedule your appointment, if needed.      Sincerely,        Toma Che MD

## 2022-06-14 NOTE — Clinical Note
Billy Evans was seen and treated in our emergency department on 6/14/2022. She may return to work on 06/20/2022. COVID+     If you have any questions or concerns, please don't hesitate to call.       Cara Valadez, DO

## 2022-06-14 NOTE — ED NOTES
The following labs labeled with pt sticker and tubed to lab:     [x] Blue     [x] Lavender   [] on ice  [x] Green/yellow  [] Green/black [] on ice  [] Yellow  [] Red  [] Pink      [] COVID-19 swab    [] Rapid  [] PCR  [] Flu swab  [] Peds Viral Panel     [] Urine Sample  [] Pelvic Cultures  [] Blood Cultures            Roman Bonds RN  06/14/22 8218

## 2022-06-14 NOTE — ED PROVIDER NOTES
[facility]  Emergency Department Encounter  EmergencyMedicine Resident     Vijay Canales  MRN: [id number]  [place]angie 1960  Date of evaluation: 6/14/22  PCP:  Elvia Vizcaino MD    This patient was evaluated in the Emergency Department for symptoms described in the history of present illness. The patient was evaluated in the context of the global COVID-19 pandemic, which necessitated consideration that the patient might be at risk for infection with the SARS-CoV-2 virus that causes COVID-19. Institutional protocols and algorithms that pertain to the evaluation of patients at risk for COVID-19 are in a state of rapid change based on information released by regulatory bodies including the CDC and federal and state organizations. These policies and algorithms were followed during the patient's care in the ED. CHIEF COMPLAINT       Chief Complaint   Patient presents with    Shortness of Breath       HISTORY OF PRESENT ILLNESS  (Location/Symptom, Timing/Onset, Context/Setting, Quality, Duration, Modifying Factors, Severity.)      Mino Schuler is a 59 y.o. female presents with 1 day of cough, shortness of breath and chest pain. He states cough is nonproductive. He states he has history of asthma and felt wheezy at work. 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 She also states she has had some chest pain today as well.   Denies any cardiac history in the past.  Patient states Jardiance or albuterol inhaler without relief    PAST MEDICAL / SURGICAL / SOCIAL / FAMILY HISTORY      has a past medical history of Allergic rhinitis, Avascular necrosis (HCC), Breast cancer ([address] Congenital kidney disease, DM (diabetes mellitus) ([address] High cholesterol, Hip pain, bilateral, History of anemia, Hypertension, Neuropathy, Snores, Type II or unspecified type diabetes mellitus without mention of complication, not stated as uncontrolled, and Uterine fibroid. has a past surgical history that includes Hysterectomy (2000); partial nephrectomy; Colonoscopy; Breast lumpectomy (Left, 2000); Ankle fracture surgery (Right); and Upper gastrointestinal endoscopy. Social History     Socioeconomic History    Marital status:      Spouse name: Not on file    Number of children: Not on file    Years of education: Not on file    Highest education level: Not on file   Occupational History    Not on file   Tobacco Use    Smoking status: Never Smoker    Smokeless tobacco: Never Used   Substance and Sexual Activity    Alcohol use: No     Alcohol/week: 0.0 standard drinks    Drug use: No    Sexual activity: Yes     Partners: Male   Other Topics Concern    Not on file   Social History Narrative    Not on file     Social Determinants of Health     Financial Resource Strain:     Difficulty of Paying Living Expenses: Not on file   Food Insecurity:     Worried About Running Out of Food in the Last Year: Not on file    Aurelia of Food in the Last Year: Not on file   Transportation Needs:     Lack of Transportation (Medical): Not on file    Lack of Transportation (Non-Medical):  Not on file   Physical Activity:     Days of Exercise per Week: Not on file    Minutes of Exercise per Session: Not on file   Stress:     Feeling of Stress : Not on file   Social Connections:     Frequency of Communication with Friends and Family: Not on file    Frequency of Social Gatherings with Friends and Family: Not on file    Attends Yazidi Services: Not on file    Active Member of Clubs or Organizations: Not on file    Attends Club or Organization Meetings: Not on file    Marital Status: Not on file   Intimate Partner Violence:     Fear of Current or Ex-Partner: Not on file    Emotionally Abused: Not on file    Physically Abused: Not on file    Sexually Abused: Not on file   Housing Stability:     Unable to Pay for Housing in the Last Year: Not on file    Number of Places Lived in the Last Year: Not on file    Unstable Housing in the Last Year: Not on file       Family History   Problem Relation Age of Onset    Diabetes Mother     High Blood Pressure Mother     Heart Disease Father     Cancer Father     Colon Cancer Paternal Aunt     Breast Cancer Paternal Aunt     Breast Cancer Paternal Grandmother        Allergies: Other    Home Medications:  Prior to Admission medications    Medication Sig Start Date End Date Taking?  Authorizing Provider   predniSONE (DELTASONE) 50 MG tablet Take 1 tablet by mouth daily for 5 days 6/14/22 6/19/22 Yes Disha Ramesh DO   acetaminophen (TYLENOL) 500 MG tablet Take 2 tablets by mouth 3 times daily for 4 days 6/14/22 6/18/22 Yes Disha Ramesh DO   benazepril (LOTENSIN) 40 MG tablet Take 1 tablet by mouth daily 2/22/22   Gama Hayes MD   atorvastatin (LIPITOR) 80 MG tablet Take 1 tablet by mouth once daily 2/22/22   Gama Hayes MD   carvedilol (COREG) 12.5 MG tablet TAKE 1 TABLET BY MOUTH TWICE DAILY WITH MEALS 2/22/22   Gama Hayes MD   spironolactone (ALDACTONE) 50 MG tablet Take 1 tablet by mouth daily 2/22/22   Gama Hayes MD   amLODIPine (NORVASC) 10 MG tablet Take 1 tablet by mouth daily 2/22/22   Gama Hayes MD   calcium carbonate 648 MG TABS Take 2 tablets by mouth daily 2/22/22 2/22/23  Gama Hayes MD   fluticasone (FLONASE) 50 MCG/ACT nasal spray 2 sprays by Each Nostril route daily 2/22/22 3/24/22  Gama Hayes MD   lidocaine 4 % external patch Apply to affected area; leave in place no longer than 12 hrs then remove the patch; use no longer than 12 hrs/day total 2/22/22   Gama Hayes MD   loratadine (CLARITIN) 10 MG tablet Take 1 tablet by mouth daily 2/22/22   Gama Hayes MD   Alcohol Swabs (ALCOHOL PREP) 70 % PADS Use 4 times times per day Diagnosis:  Diabetes Non-Insulin Dependent 2/22/22   Gama Hayes MD   hydroCHLOROthiazide (HYDRODIURIL) 25 MG tablet Take 1 tablet by mouth every morning 2/22/22   Nick Taveras MD   metroNIDAZOLE (METROGEL VAGINAL) 0.75 % vaginal gel Place vaginally 2 times daily 2/22/22   Nick Taveras MD   metFORMIN (GLUCOPHAGE) 1000 MG tablet TAKE 1 TABLET BY MOUTH TWICE DAILY WITH MEALS 2/7/22   Nick Taveras MD   blood glucose monitor strips Use 4 times per day Diagnosis:  Diabetes Non-Insulin Dependent 11/1/21   Nick Taveras MD   glimepiride (AMARYL) 4 MG tablet Take 1 tablet by mouth every morning (before breakfast) 11/1/21   Nick Taveras MD   Blood Pressure Monitoring (B-D ASSURE BPM/AUTO ARM CUFF) MISC 1 Device by Does not apply route daily  Patient not taking: Reported on 2/22/2022 11/1/21   Nick Taveras MD   diclofenac sodium (VOLTAREN) 1 % GEL Apply 4 g topically 4 times daily as needed for Pain 7/31/20 2/22/22  Aleks Dial MD   Lancets MISC 1 each by Does not apply route 2 times daily Use___times daily    Diagnisis:250.0  Diabetes Mellitus____Insulin Dependent___Non-Insulin Dependent 4/14/20   Nick Taveras MD       REVIEW OF SYSTEMS    (2-9 systems for level 4, 10 or more for level 5)      Review of Systems   Constitutional: Negative for chills and fever. HENT: Negative for rhinorrhea and sore throat. Eyes: Negative for photophobia. Respiratory: Positive for cough and shortness of breath. Negative for chest tightness. Cardiovascular: Positive for chest pain. Gastrointestinal: Negative for abdominal distention, anal bleeding, constipation, diarrhea, nausea and vomiting. Endocrine: Negative for polyuria. Genitourinary: Negative for difficulty urinating and flank pain. Musculoskeletal: Negative for arthralgias. Skin: Negative for rash. Neurological: Negative for weakness and headaches.        PHYSICAL EXAM   (up to 7 for level 4, 8 or more for level 5)      INITIAL VITALS:   BP (!) 152/95   Pulse 82   Temp 98.4 °F (36.9 °C) (Oral)   Resp 16   Ht 5' 8\" (1.727 m)   Wt 250 lb (113.4 kg)   SpO2 97%   BMI 38.01 kg/m²     Physical Exam  Constitutional:       General: She is not in acute distress. Appearance: She is not ill-appearing. HENT:      Head: Normocephalic and atraumatic. Nose: Nose normal.      Mouth/Throat:      Mouth: Mucous membranes are moist.      Pharynx: No posterior oropharyngeal erythema. Eyes:      Extraocular Movements: Extraocular movements intact. Pupils: Pupils are equal, round, and reactive to light. Cardiovascular:      Rate and Rhythm: Normal rate. Pulses: Normal pulses. Heart sounds: Normal heart sounds. Pulmonary:      Effort: No respiratory distress. Breath sounds: No stridor. Wheezing present. No rhonchi or rales. Chest:      Chest wall: No tenderness. Abdominal:      General: Abdomen is flat. Palpations: Abdomen is soft. Tenderness: There is no abdominal tenderness. Musculoskeletal:      Cervical back: No rigidity. Right lower leg: No edema. Left lower leg: No edema. Skin:     Capillary Refill: Capillary refill takes less than 2 seconds. Coloration: Skin is not jaundiced. Findings: No rash. Neurological:      General: No focal deficit present. Mental Status: She is oriented to person, place, and time.          DIFFERENTIAL  DIAGNOSIS     PLAN (LABS / IMAGING / EKG):  Orders Placed This Encounter   Procedures    COVID-19, Rapid    XR CHEST PORTABLE    CBC with Auto Differential    Basic Metabolic Panel w/ Reflex to MG    Troponin    Brain Natriuretic Peptide    Initiate ED RT Aerosol protocol    EKG 12 Lead       MEDICATIONS ORDERED:  Orders Placed This Encounter   Medications    predniSONE (DELTASONE) tablet 60 mg    predniSONE (DELTASONE) 50 MG tablet     Sig: Take 1 tablet by mouth daily for 5 days     Dispense:  5 tablet     Refill:  0    acetaminophen (TYLENOL) 500 MG tablet     Sig: Take 2 tablets by mouth 3 times daily for 4 days     Dispense:  24 tablet     Refill:  0       DDX: RADIOLOGY:  XR CHEST PORTABLE    Result Date: 6/14/2022  No acute cardiopulmonary abnormality. EKG Interpretation    Interpreted by myself    Rhythm: normal sinus   Rate: normal  Axis: normal  Ectopy: none  Conduction: normal  ST Segments: No ST depression or elevation  T Waves: T wave inversions in V6 and aVL unchanged  Q Waves: none    Clinical Impression: non-specific EKG    All EKG's are interpreted by the Emergency Department Physician who either signs or Co-signs this chart in the absence of a cardiologist.    EMERGENCY DEPARTMENT COURSE:  ED Course as of 06/14/22 0353   Tue Jun 14, 2022   0126 Patient evaluated bedside. Nontoxic-appearing. She is wheezing on exam.  Will get cardiac labs, chest x-ray, EKG and COVID. Will give steroids and do breathing treatments [ZE]   0209 No acute process on chest x-ray [ZE]   0247 Labs unremarkable. Patient is COVID-positive. Awaiting surgical [ZE]   3098 2nd trop 6 will d/c [ZE]      ED Course User Index  [ZE] Elisabeth Dorsey DO       PROCEDURES:  Procedures     CONSULTS:  None    CRITICAL CARE:  none    MDM  Presents with shortness of chest pain nontoxic-appearing. Exam consistent with asthma exacerbation. Symptoms improved after steroids. Patient was also COVID-positive no evidence of severe COVID-pneumonia on x-ray. Cardiac biomarkers and labs unremarkable. Patient discharged home able to march without hypoxia    FINAL IMPRESSION      1. Mild intermittent asthma with exacerbation    2.  COVID-19        DISPOSITION / PLAN     DISPOSITION Decision To Discharge 06/14/2022 03:43:29 AM      PATIENT REFERRED TO:  [facility]  [address] [place] [address]  [phone]    If symptoms worsen      DISCHARGE MEDICATIONS:  Discharge Medication List as of 6/14/2022  3:44 AM      START taking these medications    Details   predniSONE (DELTASONE) 50 MG tablet Take 1 tablet by mouth daily for 5 days, Disp-5 tablet, R-0Print acetaminophen (TYLENOL) 500 MG tablet Take 2 tablets by mouth 3 times daily for 4 days, Disp-24 tablet, R-0Print             Vonnie Viramontes DO  Emergency Medicine Resident    (Please note that portions of thisnote were completed with a voice recognition program.  Efforts were made to edit the dictations but occasionally words are mis-transcribed.)        Shelbie Asencio DO  Resident  06/14/22 3540

## 2022-06-14 NOTE — PROGRESS NOTES
RT to bedside to eval pt. Breath sounds bilaterally clear. Respiratory rate even and non labored. Oxygen sats 94% on room air. No indication for treatments at this time.

## 2022-06-14 NOTE — ED NOTES
Discharge instruction given to PT.  With follow up care information      Annabelle Penny RN  06/14/22 [id number]

## 2022-06-15 ENCOUNTER — CARE COORDINATION (OUTPATIENT)
Dept: CARE COORDINATION | Age: 62
End: 2022-06-15

## 2022-06-15 NOTE — CARE COORDINATION
Patient was called to follow up with most recent ER visit. There was no answer. A message was left on voicemail to have patient call back. Office number given [phone].

## 2022-06-21 ENCOUNTER — CARE COORDINATION (OUTPATIENT)
Dept: CARE COORDINATION | Age: 62
End: 2022-06-21

## 2022-06-21 NOTE — CARE COORDINATION
Patient contacted regarding COVID-19 risk and screening. Medical Assistant contacted the patient by telephone to perform follow-up call. Verified name and  with patient as identifiers. Symptoms reviewed with patient. Patient reports symptoms are improving. Due to no new or worsening symptoms the RN CTN/ACM was not notified for escalation. This author reviewed discharge instructions, medical action plan and red flags such as increased shortness of breath, increasing fever, worsening cough or chest pain with patient who verbalized understanding. Discussed exposure protocols and quarantine with CDC Guidelines What To Do If You Are Sick    Patient who was given an opportunity for questions and concerns. The patient agrees to contact their healthcare provider for questions related to their healthcare. Author provided contact information for future reference.

## 2022-06-21 NOTE — ED PROVIDER NOTES
distress. Talking in full sentences. Normocephalic atraumatic with moist mucous membranes. Heart sounds regular lungs auscultation bilaterally with no rales rhonchi's or wheezes. Abdomen soft nontender nondistended. Alert and oriented x4 with no focal deficits. No extremity edema. MDM/Plan:   URI-like symptoms accompanied with chest pain. Concern for overall URI-like picture. However does have a history of asthma. Albuterol is not been helping her. Will get basic cardiac work-up given patient's risk factors however if negative likely plan for discharge home. Patient's labs unremarkable aside from that patient is positive for COVID. She is not any risk distress and saturating well. Okay for discharge with strict return precautions. EKG Interpretation    Interpreted by emergency department physician    Clinical Impression: Normal sinus rhythm with minimal voltage criteria for LVH. And age-indeterminate signs for lateral ischemia.   Improved from prior EKG    Deejay Rojas MD        Critical Care: None     Deejay Rojas MD  Attending Emergency Physician        Deejay Rojas MD  06/21/22 [id number]

## 2022-08-01 ENCOUNTER — TELEPHONE (OUTPATIENT)
Dept: INTERNAL MEDICINE | Age: 62
End: 2022-08-01

## 2022-08-01 NOTE — TELEPHONE ENCOUNTER
Received triage call from [facility], pt states 3rd toe on right foot is discolored. States she injured it several years ago but that she thinks it's getting worse. Daughter looked at toe and states discoloration is primarily at tip of toe. PCP is out for next 3 weeks, was going to request podiatry referral be signed by covering physician, but in further discussion with pt she states she had a tooth pulled a couple weeks ago and her BP was elevated in 180s-200s and they almost wouldn't pull her tooth. Pt states it came down enough that they went ahead and pulled it. Scheduled pt for OV with PCR later this week, podiatry referral will be addressed at that time as well.

## 2022-08-18 ENCOUNTER — TELEPHONE (OUTPATIENT)
Dept: INTERNAL MEDICINE | Age: 62
End: 2022-08-18

## 2022-08-18 NOTE — TELEPHONE ENCOUNTER
Pt's appt on 8/23 at 1:30 was scheduled several weeks ago when pt's PCP was not immediately available and subsequently rescheduled per pt's request. Pt's PCP is now available, has opening on 8/24 at 2pm. PC to pt to see if available to take appt with PCP x2, Left HIPAA compliant message identifying self and nature of call, requested call back to writer or office, phone numbers given.     Appt has been rescheduled for pt to see her PCP on 8/24 at 2:00pm

## 2022-08-22 NOTE — TELEPHONE ENCOUNTER
Pt called back, confirmed moved appt time/date. Pt agreeable. States she has to work at 4 that day so may need to leave office right after being seen.

## 2022-08-24 ENCOUNTER — OFFICE VISIT (OUTPATIENT)
Dept: INTERNAL MEDICINE | Age: 62
End: 2022-08-24
Payer: COMMERCIAL

## 2022-08-24 VITALS
TEMPERATURE: 98 F | OXYGEN SATURATION: 96 % | SYSTOLIC BLOOD PRESSURE: 141 MMHG | WEIGHT: 246 LBS | DIASTOLIC BLOOD PRESSURE: 98 MMHG | BODY MASS INDEX: 36.43 KG/M2 | HEART RATE: 80 BPM | HEIGHT: 69 IN

## 2022-08-24 DIAGNOSIS — R60.0 PEDAL EDEMA: ICD-10-CM

## 2022-08-24 DIAGNOSIS — E11.9 TYPE 2 DIABETES MELLITUS WITHOUT COMPLICATION, WITHOUT LONG-TERM CURRENT USE OF INSULIN (HCC): ICD-10-CM

## 2022-08-24 DIAGNOSIS — J30.2 SEASONAL ALLERGIES: ICD-10-CM

## 2022-08-24 DIAGNOSIS — I10 UNCONTROLLED HYPERTENSION: ICD-10-CM

## 2022-08-24 DIAGNOSIS — Z23 NEED FOR PROPHYLACTIC VACCINATION AGAINST STREPTOCOCCUS PNEUMONIAE (PNEUMOCOCCUS): ICD-10-CM

## 2022-08-24 DIAGNOSIS — G89.29 TOE PAIN, CHRONIC, RIGHT: ICD-10-CM

## 2022-08-24 DIAGNOSIS — M79.674 TOE PAIN, CHRONIC, RIGHT: ICD-10-CM

## 2022-08-24 LAB — HBA1C MFR BLD: 9.1 %

## 2022-08-24 PROCEDURE — 99213 OFFICE O/P EST LOW 20 MIN: CPT | Performed by: INTERNAL MEDICINE

## 2022-08-24 PROCEDURE — 83036 HEMOGLOBIN GLYCOSYLATED A1C: CPT | Performed by: INTERNAL MEDICINE

## 2022-08-24 PROCEDURE — 3046F HEMOGLOBIN A1C LEVEL >9.0%: CPT | Performed by: INTERNAL MEDICINE

## 2022-08-24 PROCEDURE — 99211 OFF/OP EST MAY X REQ PHY/QHP: CPT | Performed by: INTERNAL MEDICINE

## 2022-08-24 PROCEDURE — 90677 PCV20 VACCINE IM: CPT | Performed by: INTERNAL MEDICINE

## 2022-08-24 RX ORDER — KETOTIFEN FUMARATE 0.35 MG/ML
1 SOLUTION/ DROPS OPHTHALMIC 2 TIMES DAILY
Qty: 1 ML | Refills: 0 | Status: SHIPPED | OUTPATIENT
Start: 2022-08-24 | End: 2022-09-03

## 2022-08-24 SDOH — ECONOMIC STABILITY: FOOD INSECURITY: WITHIN THE PAST 12 MONTHS, YOU WORRIED THAT YOUR FOOD WOULD RUN OUT BEFORE YOU GOT MONEY TO BUY MORE.: NEVER TRUE

## 2022-08-24 SDOH — ECONOMIC STABILITY: FOOD INSECURITY: WITHIN THE PAST 12 MONTHS, THE FOOD YOU BOUGHT JUST DIDN'T LAST AND YOU DIDN'T HAVE MONEY TO GET MORE.: NEVER TRUE

## 2022-08-24 ASSESSMENT — SOCIAL DETERMINANTS OF HEALTH (SDOH): HOW HARD IS IT FOR YOU TO PAY FOR THE VERY BASICS LIKE FOOD, HOUSING, MEDICAL CARE, AND HEATING?: NOT HARD AT ALL

## 2022-08-24 ASSESSMENT — PATIENT HEALTH QUESTIONNAIRE - PHQ9
SUM OF ALL RESPONSES TO PHQ QUESTIONS 1-9: 0
1. LITTLE INTEREST OR PLEASURE IN DOING THINGS: 0
SUM OF ALL RESPONSES TO PHQ QUESTIONS 1-9: 0
SUM OF ALL RESPONSES TO PHQ QUESTIONS 1-9: 0
2. FEELING DOWN, DEPRESSED OR HOPELESS: 0
SUM OF ALL RESPONSES TO PHQ9 QUESTIONS 1 & 2: 0
SUM OF ALL RESPONSES TO PHQ QUESTIONS 1-9: 0

## 2022-08-24 NOTE — PROGRESS NOTES
[facility]      Date of Patient's Visit: 8/24/2022    Progress note    Patient Care Team:  Catherine Ha MD as PCP - General (Internal Medicine)  Catherine Ha MD as PCP - REHABILITATION [facility] Provider      [address] [place] [address]  Chief Complaint   Patient presents with    Foot Problem     Wants to see podiatry, middle toe on right foot, skin is darker       Vickii No is a 59 y.o. female who presents for toe pain and f/u on chronic medical problems     Uncontrolled DM : SEC TO DIet changes   Uncontrolled hypertension : sec to too much salt. She loves to eat potato chips   Itchy eyes         ROS  All other review of systems negative, except for those noted.     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    DM (diabetes mellitus) ([address]     High cholesterol     Hip pain, bilateral     History of anemia     Hypertension     Neuropathy     Snores     Type II or unspecified type diabetes mellitus without mention of complication, not stated as uncontrolled     Uterine fibroid        Past Surgical History:   Procedure Laterality Date    ANKLE FRACTURE SURGERY Right     BREAST LUMPECTOMY Left 2000    COLONOSCOPY      at [place], were negative as per patient    HYSTERECTOMY (CERVIX STATUS UNKNOWN)  2000    secondary to fibroids    PARTIAL NEPHRECTOMY      left in childhood, eventually complete     UPPER GASTROINTESTINAL ENDOSCOPY      years ago       Family History   Problem Relation Age of Onset    Diabetes Mother     High Blood Pressure Mother     Heart Disease Father     Cancer Father     Colon Cancer Paternal Aunt     Breast Cancer Paternal Aunt     Breast Cancer Paternal Grandmother        Social History     Socioeconomic History    Marital status:      Spouse name: None    Number of children: None    Years of education: None    Highest education level: None   Tobacco Use    Smoking status: Never    Smokeless tobacco: Never   Substance and Sexual Activity    Alcohol use: No     Alcohol/week: 0.0 standard drinks    Drug use: No    Sexual activity: Yes     Partners: Male     Social Determinants of Health     Financial Resource Strain: Low Risk     Difficulty of Paying Living Expenses: Not hard at all   Food Insecurity: No Food Insecurity    Worried About [employer] in the Last Year: Never true    Ran Out of Food in the Last Year: Never true       Current Outpatient Medications   Medication Sig Dispense Refill    ketotifen (ZADITOR) 0.025 % ophthalmic solution Place 1 drop into the right eye 2 times daily for 10 days 1 mL 0    atorvastatin (LIPITOR) 80 MG tablet Take 1 tablet by mouth once daily 90 tablet 0    carvedilol (COREG) 12.5 MG tablet TAKE 1 TABLET BY MOUTH TWICE DAILY WITH MEALS 180 tablet 0    spironolactone (ALDACTONE) 50 MG tablet Take 1 tablet by mouth daily 90 tablet 3    amLODIPine (NORVASC) 10 MG tablet Take 1 tablet by mouth daily 90 tablet 3    loratadine (CLARITIN) 10 MG tablet Take 1 tablet by mouth daily 90 tablet 0    hydroCHLOROthiazide (HYDRODIURIL) 25 MG tablet Take 1 tablet by mouth every morning 90 tablet 1    metFORMIN (GLUCOPHAGE) 1000 MG tablet TAKE 1 TABLET BY MOUTH TWICE DAILY WITH MEALS 180 tablet 0    blood glucose monitor strips Use 4 times per day Diagnosis:  Diabetes Non-Insulin Dependent 100 strip 5    glimepiride (AMARYL) 4 MG tablet Take 1 tablet by mouth every morning (before breakfast) 90 tablet 3    diclofenac sodium (VOLTAREN) 1 % GEL Apply 4 g topically 4 times daily as needed for Pain 100 g 0    Lancets MISC 1 each by Does not apply route 2 times daily Use___times daily    Diagnisis:250.0  Diabetes Mellitus____Insulin Dependent___Non-Insulin Dependent 100 each 11    acetaminophen (TYLENOL) 500 MG tablet Take 2 tablets by mouth 3 times daily for 4 days 24 tablet 0    benazepril (LOTENSIN) 40 MG tablet Take 1 tablet by mouth daily 90 tablet 0    calcium carbonate 648 MG TABS Take 2 tablets by mouth daily (Patient not taking: Reported on 8/24/2022) 500 tablet 1    fluticasone (FLONASE) 50 MCG/ACT nasal spray 2 sprays by Each Nostril route daily 1 each 3    lidocaine 4 % external patch Apply to affected area; leave in place no longer than 12 hrs then remove the patch; use no longer than 12 hrs/day total (Patient not taking: Reported on 8/24/2022) 14 patch 0    Alcohol Swabs (ALCOHOL PREP) 70 % PADS Use 4 times times per day Diagnosis:  Diabetes Non-Insulin Dependent 100 each 5    metroNIDAZOLE (METROGEL VAGINAL) 0.75 % vaginal gel Place vaginally 2 times daily (Patient not taking: Reported on 8/24/2022) 1 each 3    Blood Pressure Monitoring (B-D ASSURE BPM/AUTO ARM CUFF) MISC 1 Device by Does not apply route daily (Patient not taking: Reported on 2/22/2022) 1 each 0     No current facility-administered medications for this visit. Allergies   Allergen Reactions    Other Nausea Only     Shrimp makes pt nauseated. Denies problems with other shellfish or seafood  Shrimp makes pt nauseated. Denies problems with other shellfish or seafood       PHYSICAL EXAM:   Vital Signs:   BP (!) 141/98   Pulse 80   Temp 98 °F (36.7 °C) (Infrared)   Ht 5' 8.5\" (1.74 m)   Wt 246 lb (111.6 kg)   SpO2 96%   BMI 36.86 kg/m²     BP Readings from Last 3 Encounters:   08/24/22 (!) 141/98   06/14/22 (!) 152/95   02/22/22 (!) 166/96        Wt Readings from Last 3 Encounters:   08/24/22 246 lb (111.6 kg)   06/14/22 250 lb (113.4 kg)   02/22/22 245 lb (111.1 kg)       Physical Exam    Body mass index is 36.86 kg/m².     RESULTS    Lab Findings    CBC:   Lab Results   Component Value Date/Time    WBC 6.2 06/14/2022 02:02 AM    HGB 13.4 06/14/2022 02:02 AM     06/14/2022 02:02 AM     BMP:   Lab Results   Component Value 9/21/2022). Reviewed prior labs and health maintenance. Discussed use, benefit, and side effects of prescribed medications. Barriers to medication compliance addressed. All patient questions answered. Pt voiced understanding.      Patient given educational materials - see patient instructions      Karoline Dakin, MD FASN  Attending Physician, [address] [place] [address], Internal Medicine Residency Program  [address] [place] [address]  8/24/2022, 3:09 PM

## 2022-09-17 DIAGNOSIS — E78.5 DYSLIPIDEMIA: Chronic | ICD-10-CM

## 2022-09-17 DIAGNOSIS — I10 ESSENTIAL HYPERTENSION: Chronic | ICD-10-CM

## 2022-09-19 RX ORDER — BENZOYL PEROXIDE
KIT TOPICAL
Qty: 90 TABLET | Refills: 0 | Status: SHIPPED | OUTPATIENT
Start: 2022-09-19

## 2022-09-19 RX ORDER — ATORVASTATIN CALCIUM 80 MG/1
TABLET, FILM COATED ORAL
Qty: 90 TABLET | Refills: 0 | Status: SHIPPED | OUTPATIENT
Start: 2022-09-19

## 2022-09-19 RX ORDER — AMLODIPINE BESYLATE 10 MG/1
TABLET ORAL
Qty: 90 TABLET | Refills: 0 | Status: SHIPPED | OUTPATIENT
Start: 2022-09-19

## 2022-09-19 RX ORDER — CARVEDILOL 12.5 MG/1
TABLET ORAL
Qty: 180 TABLET | Refills: 0 | Status: SHIPPED | OUTPATIENT
Start: 2022-09-19

## 2022-09-19 NOTE — TELEPHONE ENCOUNTER
(diabetes mellitus)     Dyslipidemia     Solitary kidney     Cancer of breast (HCC)     Hip pain, bilateral     Neuropathy due to secondary diabetes ([facility]     Postcoital bleeding secondary to vaginal atrophy     Somatic dysfunction of spine, thoracic     Somatic dysfunction of spine, lumbar     History of hysterectomy for benign disease (fibroids)     History of lumpectomy of left breast     Absence of both cervix and uterus, acquired     Vaginal atrophy

## 2022-11-29 DIAGNOSIS — E11.9 TYPE 2 DIABETES MELLITUS WITHOUT COMPLICATION, WITHOUT LONG-TERM CURRENT USE OF INSULIN (HCC): ICD-10-CM

## 2022-12-01 NOTE — TELEPHONE ENCOUNTER
Last visit: 08/24/2022  Last Med refill: 11/01/2021  Does patient have enough medication for 72 hours:     Next Visit Date:  Future Appointments   Date Time Provider Lucas Martines   12/2/2022  9:30 AM STA DEXA RM ST[facility]       Health Maintenance   Topic Date Due    Diabetic retinal exam  10/28/2016    COVID-19 Vaccine (3 - Booster for Pfizer series) 09/20/2021    Flu vaccine (1) 08/01/2022    Diabetic microalbuminuria test  11/01/2022    Lipids  11/01/2022    A1C test (Diabetic or Prediabetic)  11/24/2022    Breast cancer screen  12/21/2022    Shingles vaccine (1 of 2) 02/22/2023 (Originally 9/1/2008)    Diabetic foot exam  08/24/2023    Depression Screen  08/24/2023    Colorectal Cancer Screen  05/11/2026    DTaP/Tdap/Td vaccine (2 - Td or Tdap) 04/13/2031    Pneumococcal 0-64 years Vaccine  Completed    Hepatitis C screen  Completed    HIV screen  Completed    Hepatitis A vaccine  Aged Out    Hib vaccine  Aged Out    Meningococcal (ACWY) vaccine  Aged Out       Hemoglobin A1C (%)   Date Value   08/24/2022 9.1   11/01/2021 7.6   04/13/2021 7.3             ( goal A1C is < 7)   Microalb/Crt.  Ratio (mcg/mg creat)   Date Value   11/01/2021 25 (H)     LDL Cholesterol (mg/dL)   Date Value   11/01/2021 85   01/11/2019 80       (goal LDL is <100)   AST (U/L)   Date Value   01/11/2019 11     ALT (U/L)   Date Value   01/11/2019 13     BUN (mg/dL)   Date Value   06/14/2022 27 (H)     BP Readings from Last 3 Encounters:   08/24/22 (!) 141/98   06/14/22 (!) 152/95   02/22/22 (!) 166/96          (goal 120/80)    All Future Testing planned in [facility]  Lab Frequency Next Occurrence   DEXA BONE DENSITY 2 SITES Once 06/10/2022   ECHO 2D WO Color Doppler Complete Once 03/04/2022   Cardiac Stress Test - w/Pharm Once 03/04/2022               Patient Active Problem List:     Seasonal allergies     HTN (hypertension)     DM (diabetes mellitus)     Dyslipidemia     Solitary kidney     Cancer of breast ([facility]     Hip pain, bilateral     Neuropathy due to secondary diabetes ([facility]     Postcoital bleeding secondary to vaginal atrophy     Somatic dysfunction of spine, thoracic     Somatic dysfunction of spine, lumbar     History of hysterectomy for benign disease (fibroids)     History of lumpectomy of left breast     Absence of both cervix and uterus, acquired     Vaginal atrophy

## 2022-12-02 ENCOUNTER — HOSPITAL ENCOUNTER (OUTPATIENT)
Dept: MAMMOGRAPHY | Age: 62
Discharge: HOME | End: 2022-12-02
Payer: COMMERCIAL

## 2022-12-02 DIAGNOSIS — M81.0 SENILE OSTEOPOROSIS: ICD-10-CM

## 2022-12-02 PROCEDURE — 77080 DXA BONE DENSITY AXIAL: CPT

## 2022-12-02 RX ORDER — GLIMEPIRIDE 4 MG/1
4 TABLET ORAL
Qty: 90 TABLET | Refills: 1 | Status: SHIPPED | OUTPATIENT
Start: 2022-12-02

## 2022-12-21 DIAGNOSIS — E11.9 TYPE 2 DIABETES MELLITUS WITHOUT COMPLICATION, WITHOUT LONG-TERM CURRENT USE OF INSULIN (HCC): Chronic | ICD-10-CM

## 2022-12-22 NOTE — TELEPHONE ENCOUNTER
Request for metformin. No showed to last deniz. Next Visit Date:  No future appointments. Health Maintenance   Topic Date Due    Diabetic retinal exam  10/28/2016    COVID-19 Vaccine (3 - Booster for Pfizer series) 09/20/2021    Flu vaccine (1) 08/01/2022    Diabetic microalbuminuria test  11/01/2022    Lipids  11/01/2022    A1C test (Diabetic or Prediabetic)  11/24/2022    Breast cancer screen  12/21/2022    Shingles vaccine (1 of 2) 02/22/2023 (Originally 9/1/2008)    Diabetic foot exam  08/24/2023    Depression Screen  08/24/2023    Colorectal Cancer Screen  05/11/2026    DTaP/Tdap/Td vaccine (2 - Td or Tdap) 04/13/2031    Pneumococcal 0-64 years Vaccine  Completed    Hepatitis C screen  Completed    HIV screen  Completed    Hepatitis A vaccine  Aged Out    Hib vaccine  Aged Out    Meningococcal (ACWY) vaccine  Aged Out       Hemoglobin A1C (%)   Date Value   08/24/2022 9.1   11/01/2021 7.6   04/13/2021 7.3             ( goal A1C is < 7)   Microalb/Crt.  Ratio (mcg/mg creat)   Date Value   11/01/2021 25 (H)     LDL Cholesterol (mg/dL)   Date Value   11/01/2021 85       (goal LDL is <100)   AST (U/L)   Date Value   01/11/2019 11     ALT (U/L)   Date Value   01/11/2019 13     BUN (mg/dL)   Date Value   06/14/2022 27 (H)     BP Readings from Last 3 Encounters:   08/24/22 (!) 141/98   06/14/22 (!) 152/95   02/22/22 (!) 166/96          (goal 120/80)    All Future Testing planned in [facility]  Lab Frequency Next Occurrence   ECHO 2D WO Color Doppler Complete Once 03/04/2022   Cardiac Stress Test - w/Pharm Once 03/04/2022         Patient Active Problem List:     Seasonal allergies     HTN (hypertension)     DM (diabetes mellitus)     Dyslipidemia     Solitary kidney     Cancer of breast (HCC)     Hip pain, bilateral     Neuropathy due to secondary diabetes ([address]     Postcoital bleeding secondary to vaginal atrophy     Somatic dysfunction of spine, thoracic     Somatic dysfunction of spine, lumbar     History of hysterectomy for benign disease (fibroids)     History of lumpectomy of left breast     Absence of both cervix and uterus, acquired     Vaginal atrophy

## 2023-03-08 DIAGNOSIS — I10 ESSENTIAL HYPERTENSION: Chronic | ICD-10-CM

## 2023-03-08 RX ORDER — AMLODIPINE BESYLATE 10 MG/1
TABLET ORAL
Qty: 90 TABLET | Refills: 0 | Status: SHIPPED | OUTPATIENT
Start: 2023-03-08

## 2023-03-08 NOTE — TELEPHONE ENCOUNTER
Request for amlodopine. Next deniz on 3/14/23    Next Visit Date:  Future Appointments   Date Time Provider Lucas Martines   3/14/2023  8:30 AM López Yoo MD [facility]   Topic Date Due    Shingles vaccine (1 of 2) Never done    Diabetic retinal exam  10/28/2016    COVID-19 Vaccine (3 - Booster for Pfizer series) 09/20/2021    Flu vaccine (1) 08/01/2022    Diabetic Alb to Cr ratio (uACR) test  11/01/2022    Lipids  11/01/2022    A1C test (Diabetic or Prediabetic)  11/24/2022    Breast cancer screen  12/21/2022    GFR test (Diabetes, CKD 3-4, OR last GFR 15-59)  06/14/2023    Diabetic foot exam  08/24/2023    Depression Screen  08/24/2023    Colorectal Cancer Screen  05/11/2026    DTaP/Tdap/Td vaccine (2 - Td or Tdap) 04/13/2031    Pneumococcal 0-64 years Vaccine  Completed    Hepatitis C screen  Completed    HIV screen  Completed    Hepatitis A vaccine  Aged Out    Hib vaccine  Aged Out    Meningococcal (ACWY) vaccine  Aged Out       Hemoglobin A1C (%)   Date Value   08/24/2022 9.1   11/01/2021 7.6   04/13/2021 7.3             ( goal A1C is < 7)   Microalb/Crt.  Ratio (mcg/mg creat)   Date Value   11/01/2021 25 (H)     LDL Cholesterol (mg/dL)   Date Value   11/01/2021 85       (goal LDL is <100)   AST (U/L)   Date Value   01/11/2019 11     ALT (U/L)   Date Value   01/11/2019 13     BUN (mg/dL)   Date Value   06/14/2022 27 (H)     BP Readings from Last 3 Encounters:   08/24/22 (!) 141/98   06/14/22 (!) 152/95   02/22/22 (!) 166/96          (goal 120/80)    All Future Testing planned in [facility]  Lab Frequency Next Occurrence         Patient Active Problem List:     Seasonal allergies     HTN (hypertension)     DM (diabetes mellitus)     Dyslipidemia     Solitary kidney     Cancer of breast (HCC)     Hip pain, bilateral     Neuropathy due to secondary diabetes ([address]     Postcoital bleeding secondary to vaginal atrophy     Somatic dysfunction of spine, thoracic     Somatic dysfunction of spine, lumbar     History of hysterectomy for benign disease (fibroids)     History of lumpectomy of left breast     Absence of both cervix and uterus, acquired     Vaginal atrophy

## 2023-04-01 DIAGNOSIS — I10 ESSENTIAL HYPERTENSION: Chronic | ICD-10-CM

## 2023-04-03 RX ORDER — SPIRONOLACTONE 50 MG/1
TABLET, FILM COATED ORAL
Qty: 30 TABLET | Refills: 0 | Status: SHIPPED | OUTPATIENT
Start: 2023-04-03

## 2023-04-03 NOTE — TELEPHONE ENCOUNTER
Postcoital bleeding secondary to vaginal atrophy     Somatic dysfunction of spine, thoracic     Somatic dysfunction of spine, lumbar     History of hysterectomy for benign disease (fibroids)     History of lumpectomy of left breast     Absence of both cervix and uterus, acquired     Vaginal atrophy

## 2023-04-05 ENCOUNTER — OFFICE VISIT (OUTPATIENT)
Dept: INTERNAL MEDICINE | Age: 63
End: 2023-04-05
Payer: COMMERCIAL

## 2023-04-05 VITALS
BODY MASS INDEX: 37.07 KG/M2 | WEIGHT: 244.6 LBS | DIASTOLIC BLOOD PRESSURE: 98 MMHG | OXYGEN SATURATION: 97 % | HEART RATE: 77 BPM | SYSTOLIC BLOOD PRESSURE: 162 MMHG | HEIGHT: 68 IN | TEMPERATURE: 97.2 F

## 2023-04-05 DIAGNOSIS — Z98.890 HISTORY OF LUMPECTOMY OF LEFT BREAST: ICD-10-CM

## 2023-04-05 DIAGNOSIS — C50.312 MALIGNANT NEOPLASM OF LOWER-INNER QUADRANT OF LEFT FEMALE BREAST, UNSPECIFIED ESTROGEN RECEPTOR STATUS (HCC): ICD-10-CM

## 2023-04-05 DIAGNOSIS — R29.818 SUSPECTED SLEEP APNEA: ICD-10-CM

## 2023-04-05 DIAGNOSIS — Z90.710 ABSENCE OF BOTH CERVIX AND UTERUS, ACQUIRED: ICD-10-CM

## 2023-04-05 DIAGNOSIS — E11.9 TYPE 2 DIABETES MELLITUS WITHOUT COMPLICATION, WITHOUT LONG-TERM CURRENT USE OF INSULIN (HCC): Chronic | ICD-10-CM

## 2023-04-05 DIAGNOSIS — R91.8 PULMONARY NODULES/LESIONS, MULTIPLE: ICD-10-CM

## 2023-04-05 DIAGNOSIS — J30.9 ALLERGIC RHINITIS, UNSPECIFIED SEASONALITY, UNSPECIFIED TRIGGER: ICD-10-CM

## 2023-04-05 DIAGNOSIS — I10 UNCONTROLLED HYPERTENSION: Primary | ICD-10-CM

## 2023-04-05 DIAGNOSIS — J30.2 SEASONAL ALLERGIES: ICD-10-CM

## 2023-04-05 DIAGNOSIS — M79.674 PAIN OF TOE OF RIGHT FOOT: ICD-10-CM

## 2023-04-05 DIAGNOSIS — E66.01 SEVERE OBESITY (BMI 35.0-39.9) WITH COMORBIDITY (HCC): ICD-10-CM

## 2023-04-05 LAB — HBA1C MFR BLD: 7.1 %

## 2023-04-05 PROCEDURE — 83036 HEMOGLOBIN GLYCOSYLATED A1C: CPT

## 2023-04-05 RX ORDER — ALBUTEROL SULFATE 90 UG/1
2 AEROSOL, METERED RESPIRATORY (INHALATION) EVERY 4 HOURS PRN
COMMUNITY
Start: 2022-12-09

## 2023-04-05 RX ORDER — BLOOD PRESSURE TEST KIT
1 KIT MISCELLANEOUS DAILY
Qty: 1 KIT | Refills: 0 | Status: SHIPPED | OUTPATIENT
Start: 2023-04-05 | End: 2023-04-05

## 2023-04-05 RX ORDER — FLUTICASONE FUROATE AND VILANTEROL 100; 25 UG/1; UG/1
1 POWDER RESPIRATORY (INHALATION) DAILY
COMMUNITY
Start: 2023-01-31

## 2023-04-05 RX ORDER — GLIMEPIRIDE 2 MG/1
2 TABLET ORAL
Qty: 30 TABLET | Refills: 1 | Status: SHIPPED | OUTPATIENT
Start: 2023-04-05

## 2023-04-05 RX ORDER — CARVEDILOL 12.5 MG/1
12.5 TABLET ORAL 2 TIMES DAILY WITH MEALS
Qty: 180 TABLET | Refills: 0 | Status: SHIPPED | OUTPATIENT
Start: 2023-04-05

## 2023-04-05 RX ORDER — MONTELUKAST SODIUM 10 MG/1
10 TABLET ORAL DAILY
Qty: 30 TABLET | Refills: 3 | Status: CANCELLED | OUTPATIENT
Start: 2023-04-05

## 2023-04-05 RX ORDER — FLUTICASONE PROPIONATE 50 MCG
1 SPRAY, SUSPENSION (ML) NASAL DAILY
Qty: 32 G | Refills: 1 | Status: SHIPPED | OUTPATIENT
Start: 2023-04-05

## 2023-04-05 RX ORDER — LIDOCAINE 4 G/G
PATCH TOPICAL
Qty: 14 PATCH | Refills: 0 | Status: SHIPPED | OUTPATIENT
Start: 2023-04-05

## 2023-04-05 RX ORDER — LORATADINE 10 MG/1
10 TABLET ORAL DAILY
Qty: 30 TABLET | Refills: 0 | Status: SHIPPED | OUTPATIENT
Start: 2023-04-05 | End: 2023-05-05

## 2023-04-05 SDOH — ECONOMIC STABILITY: FOOD INSECURITY: WITHIN THE PAST 12 MONTHS, YOU WORRIED THAT YOUR FOOD WOULD RUN OUT BEFORE YOU GOT MONEY TO BUY MORE.: NEVER TRUE

## 2023-04-05 SDOH — ECONOMIC STABILITY: FOOD INSECURITY: WITHIN THE PAST 12 MONTHS, THE FOOD YOU BOUGHT JUST DIDN'T LAST AND YOU DIDN'T HAVE MONEY TO GET MORE.: NEVER TRUE

## 2023-04-05 SDOH — ECONOMIC STABILITY: INCOME INSECURITY: HOW HARD IS IT FOR YOU TO PAY FOR THE VERY BASICS LIKE FOOD, HOUSING, MEDICAL CARE, AND HEATING?: NOT HARD AT ALL

## 2023-04-05 SDOH — ECONOMIC STABILITY: HOUSING INSECURITY
IN THE LAST 12 MONTHS, WAS THERE A TIME WHEN YOU DID NOT HAVE A STEADY PLACE TO SLEEP OR SLEPT IN A SHELTER (INCLUDING NOW)?: NO

## 2023-04-05 ASSESSMENT — ENCOUNTER SYMPTOMS
DIARRHEA: 0
FACIAL SWELLING: 0
EYE ITCHING: 0
WHEEZING: 0
EYE PAIN: 1
BACK PAIN: 0
SORE THROAT: 1
PHOTOPHOBIA: 0
VOMITING: 0
EYE DISCHARGE: 0
SHORTNESS OF BREATH: 0
APNEA: 0
ALLERGIC/IMMUNOLOGIC NEGATIVE: 1
SINUS PAIN: 1
RHINORRHEA: 1
SINUS PRESSURE: 1
GASTROINTESTINAL NEGATIVE: 1
EYE REDNESS: 0
CHEST TIGHTNESS: 0
ABDOMINAL DISTENTION: 0
COLOR CHANGE: 0
ABDOMINAL PAIN: 0
VOICE CHANGE: 0
CONSTIPATION: 0
CHOKING: 0
COUGH: 1
STRIDOR: 0
NAUSEA: 0

## 2023-04-05 ASSESSMENT — PATIENT HEALTH QUESTIONNAIRE - PHQ9
6. FEELING BAD ABOUT YOURSELF - OR THAT YOU ARE A FAILURE OR HAVE LET YOURSELF OR YOUR FAMILY DOWN: 2
7. TROUBLE CONCENTRATING ON THINGS, SUCH AS READING THE NEWSPAPER OR WATCHING TELEVISION: 0
10. IF YOU CHECKED OFF ANY PROBLEMS, HOW DIFFICULT HAVE THESE PROBLEMS MADE IT FOR YOU TO DO YOUR WORK, TAKE CARE OF THINGS AT HOME, OR GET ALONG WITH OTHER PEOPLE: 0
3. TROUBLE FALLING OR STAYING ASLEEP: 0
2. FEELING DOWN, DEPRESSED OR HOPELESS: 2
SUM OF ALL RESPONSES TO PHQ9 QUESTIONS 1 & 2: 4
8. MOVING OR SPEAKING SO SLOWLY THAT OTHER PEOPLE COULD HAVE NOTICED. OR THE OPPOSITE, BEING SO FIGETY OR RESTLESS THAT YOU HAVE BEEN MOVING AROUND A LOT MORE THAN USUAL: 0
SUM OF ALL RESPONSES TO PHQ QUESTIONS 1-9: 12
9. THOUGHTS THAT YOU WOULD BE BETTER OFF DEAD, OR OF HURTING YOURSELF: 0
SUM OF ALL RESPONSES TO PHQ QUESTIONS 1-9: 12
4. FEELING TIRED OR HAVING LITTLE ENERGY: 3
SUM OF ALL RESPONSES TO PHQ QUESTIONS 1-9: 12
SUM OF ALL RESPONSES TO PHQ QUESTIONS 1-9: 12
1. LITTLE INTEREST OR PLEASURE IN DOING THINGS: 2
5. POOR APPETITE OR OVEREATING: 3

## 2023-04-05 NOTE — PROGRESS NOTES
Attending Physician Statement  I have discussed the care of Tomas Duffy, including pertinent history and exam findings with the resident. I have reviewed the key elements of all parts of the encounter with the resident. I agree with the assessment, and status of the problem list as documented and this was also documented by the resident. The medication list was reviewed with the resident and is up to date. The return visit should be in 4 weeks . Diagnosis Orders   1. Uncontrolled hypertension  carvedilol (COREG) 12.5 MG tablet    Lipid Panel    Microalbumin, Ur    AFL - Sara Malloy MD, Ophthalmology, [place]    DISCONTINUED: Blood Pressure KIT      2. Type 2 diabetes mellitus without complication, without long-term current use of insulin (HCC)  POCT glycosylated hemoglobin (Hb A1C)    metFORMIN (GLUCOPHAGE) 1000 MG tablet    [facility]    glimepiride (AMARYL) 2 MG tablet    blood glucose monitor kit and supplies    DISCONTINUED: blood glucose monitor kit and supplies      3. Suspected sleep apnea        4. Severe obesity (BMI 35.0-39. 9) with comorbidity ([address]        5. Allergic rhinitis, unspecified seasonality, unspecified trigger  fluticasone (FLONASE) 50 MCG/ACT nasal spray    loratadine (CLARITIN) 10 MG tablet      6. Seasonal allergies        7. Malignant neoplasm of lower-inner quadrant of left female breast, unspecified estrogen receptor status ([address]  RIP DIGITAL SCREEN W OR WO CAD BILATERAL      8. History of lumpectomy of left breast        9. Pain of toe of right foot  [address]      10. Pulmonary nodules/lesions, multiple        11.  Absence of both cervix and uterus, acquired             Marleen Harada, MD   Attending Physician, [facility]   Faculty, Internal Medicine Residency Program  [address] [facility]
Bree [place], (age 10 mo+), MDV, 0.5mL 11/28/2017    Influenza, FLUARIX, FLULAVAL, FLUZONE (age 10 mo+) AND AFLURIA, (age 1 y+), PF, 0.5mL 11/01/2021    Influenza, Quadv, 6 mo and older, IM (Fluzone, Flulaval) 11/28/2017    Pneumococcal, PCV20, PREVNAR 20, (age 18y+), IM, 0.5mL 08/24/2022    Pneumococcal, PPSV23, PNEUMOVAX 23, (age 2y+), SC/IM, 0.5mL 10/12/2015    TDaP, ADAYVETTE (age 10y-63y), López Maurice (age 10y+), IM, 0.5mL 04/13/2021       Health Maintenance   Topic Date Due    Shingles vaccine (1 of 2) Never done    Diabetic retinal exam  10/28/2016    COVID-19 Vaccine (3 - Booster for Pfizer series) 09/20/2021    Diabetic Alb to Cr ratio (uACR) test  11/01/2022    Lipids  11/01/2022    A1C test (Diabetic or Prediabetic)  11/24/2022    Breast cancer screen  12/21/2022    GFR test (Diabetes, CKD 3-4, OR last GFR 15-59)  06/14/2023    Flu vaccine (Season Ended) 08/01/2023    Diabetic foot exam  08/24/2023    Depression Monitoring  08/24/2023    Colorectal Cancer Screen  05/11/2026    DTaP/Tdap/Td vaccine (2 - Td or Tdap) 04/13/2031    Pneumococcal 0-64 years Vaccine  Completed    Hepatitis C screen  Completed    HIV screen  Completed    Hepatitis A vaccine  Aged Out    Hib vaccine  Aged Out    Meningococcal (ACWY) vaccine  Aged Out    Depression Screen  Discontinued       Assessment & Plan   Uncontrolled hypertension  The following orders have not been finalized:  -     Blood Pressure KIT  -     carvedilol (COREG) 12.5 MG tablet  Type 2 diabetes mellitus without complication, without long-term current use of insulin ([address]  The following orders have not been finalized:  -     blood glucose monitor kit and supplies  -     metFORMIN (GLUCOPHAGE) 1000 MG tablet  Essential hypertension  The following orders have not been finalized:  -     carvedilol (COREG) 12.5 MG tablet  Left ankle pain, unspecified chronicity  The following orders have not been finalized:  -     lidocaine 4 % external patch  Malignant neoplasm of lower-inner
of lower-inner quadrant of left female breast, unspecified estrogen receptor status ([facility]  -     [facility] DIGITAL SCREEN W OR WO CAD BILATERAL; Future    Pain of toe of right foot        -     Patient has wide feet, has compression impression on toes. Recommended to follow-up with podiatrist for further evaluation and management  -     [facility]    Pulmonary nodules/lesions, multiple        -     Follows up with pulmonology at [facility]. Pulmonary nodules less than 6 mm in size on 1/2/23. Plan for repeat evaluation with imaging in a year. Has follow-up appointment with pulmonology in 2-3 months    Absence of both cervix and uterus, acquired    Other orders  -     lidocaine 4 % external patch; Apply to affected area; leave in place no longer than 12 hrs then remove the patch; use no longer than 12 hrs/day total      FOLLOW UP AND INSTRUCTIONS:  4 weeks     Daniela Quirozkizzy received counseling on the following healthy behaviors: nutrition, exercise, medication adherence, and blood pressure control medications compliance/ DASH diet. Discussed use, benefit, and side effects of prescribed medications. Barriers to medication compliance addressed. All patient questions answered. Pt voiced understanding. Nathan Ortiz MD   Internal Medicine  4/5/2023, 10:42 AM    This note is created with the assistance of a speech-recognition program. While intending to generate a document that actually reflects the content of thevisit, the document can still have some mistakes which may not have been identified and corrected by editing.

## 2023-04-05 NOTE — PATIENT INSTRUCTIONS
Medications e-scribe to pharmacy of pt's choice. Labs given to patient, they will have them done before their next visit. MAMMOGRAM INSTRUCTIONS    Your physician has ordered a mammogram for you. Mammography is an X-ray that creates an image of the breast.     To prepare yourself for the test shower, or bathe, as usual, but do not use any deodorants, powders, or perfumes under or around the breast or chest area. You will be called by the Scheduling Department to schedule your testing. If you do not hear from them within a week, please call them at [phone] to schedule the appointment. This will be done at any [address] [facility] location of your choice. Report to the Admitting Department 20 minutes before the test to complete the proper paperwork. If you cannot keep this appointment, please call [phone] to cancel and   reschedule your appointment. Referral to Diabetes education,Eye doctor, and Foot doctor was placed, summary of care printed and faxed to office, phone numbers given to pt, they will contact office for an appt     Return To Clinic 4 weeks. After Visit Summary  given and reviewed. --    It is very important for your care that you keep your appointment. If for some reason you are unable to keep your appointment it is equally important that you call our office at [phone] to cancel your appointment and reschedule. Failure to do so may result in your termination from our practice.      Michel Becerril, [place]

## 2023-05-01 DIAGNOSIS — I10 ESSENTIAL HYPERTENSION: Chronic | ICD-10-CM

## 2023-05-03 RX ORDER — SPIRONOLACTONE 50 MG/1
TABLET, FILM COATED ORAL
Qty: 30 TABLET | Refills: 5 | Status: SHIPPED | OUTPATIENT
Start: 2023-05-03

## 2023-05-03 NOTE — TELEPHONE ENCOUNTER
Last visit: 4/5/23  Last Med refill:   Does patient have enough medication for 72 hours: No:     Next Visit Date:  Future Appointments   Date Time Provider Lucas Martines   5/17/2023  8:30 AM Jessica Beltran MD [address] [place] [address] 305 IM [id number]   5/19/2023  7:00 AM STA MAMMO RM 1 STAZ MAMMO STA Radiolog       Health Maintenance   Topic Date Due    Diabetic retinal exam  10/28/2016    COVID-19 Vaccine (3 - Booster for Pfizer series) 09/20/2021    Diabetic Alb to Cr ratio (uACR) test  11/01/2022    Lipids  11/01/2022    Breast cancer screen  12/21/2022    Shingles vaccine (1 of 2) 05/02/2024 (Originally 9/1/2008)    GFR test (Diabetes, CKD 3-4, OR last GFR 15-59)  06/14/2023    Flu vaccine (Season Ended) 08/01/2023    Diabetic foot exam  08/24/2023    A1C test (Diabetic or Prediabetic)  04/05/2024    Depression Monitoring  04/05/2024    Colorectal Cancer Screen  05/11/2026    DTaP/Tdap/Td vaccine (2 - Td or Tdap) 04/13/2031    Pneumococcal 0-64 years Vaccine  Completed    Hepatitis C screen  Completed    HIV screen  Completed    Hepatitis A vaccine  Aged Out    Hib vaccine  Aged Out    Meningococcal (ACWY) vaccine  Aged Out    Depression Screen  Discontinued       Hemoglobin A1C (%)   Date Value   04/05/2023 7.1   08/24/2022 9.1   11/01/2021 7.6             ( goal A1C is < 7)   Microalb/Crt.  Ratio (mcg/mg creat)   Date Value   11/01/2021 25 (H)     LDL Cholesterol (mg/dL)   Date Value   11/01/2021 85   01/11/2019 80       (goal LDL is <100)   AST (U/L)   Date Value   01/11/2019 11     ALT (U/L)   Date Value   01/11/2019 13     BUN (mg/dL)   Date Value   06/14/2022 27 (H)     BP Readings from Last 3 Encounters:   04/05/23 (!) 162/98   08/24/22 (!) 141/98   06/14/22 (!) 152/95          (goal 120/80)    All Future Testing planned in [facility]  Lab Frequency Next Occurrence   RIP DIGITAL SCREEN W OR WO CAD BILATERAL Once 04/05/2023   Lipid Panel Once 04/05/2023   Microalbumin, Ur Once 04/05/2023               Patient Active Problem

## 2023-06-01 DIAGNOSIS — I10 UNCONTROLLED HYPERTENSION: ICD-10-CM

## 2023-06-01 DIAGNOSIS — J30.9 ALLERGIC RHINITIS, UNSPECIFIED SEASONALITY, UNSPECIFIED TRIGGER: ICD-10-CM

## 2023-06-01 DIAGNOSIS — I10 ESSENTIAL HYPERTENSION: Chronic | ICD-10-CM

## 2023-06-01 DIAGNOSIS — E11.9 TYPE 2 DIABETES MELLITUS WITHOUT COMPLICATION, WITHOUT LONG-TERM CURRENT USE OF INSULIN (HCC): Chronic | ICD-10-CM

## 2023-06-05 RX ORDER — LORATADINE 10 MG/1
TABLET ORAL
Qty: 30 TABLET | Refills: 5 | Status: SHIPPED | OUTPATIENT
Start: 2023-06-05

## 2023-06-05 RX ORDER — CARVEDILOL 12.5 MG/1
TABLET ORAL
Qty: 180 TABLET | Refills: 0 | Status: SHIPPED | OUTPATIENT
Start: 2023-06-05

## 2023-06-05 RX ORDER — AMLODIPINE BESYLATE 10 MG/1
TABLET ORAL
Qty: 90 TABLET | Refills: 0 | Status: SHIPPED | OUTPATIENT
Start: 2023-06-05

## 2023-06-21 DIAGNOSIS — E11.9 TYPE 2 DIABETES MELLITUS WITHOUT COMPLICATION, WITHOUT LONG-TERM CURRENT USE OF INSULIN (HCC): Chronic | ICD-10-CM

## 2023-06-27 RX ORDER — GLIMEPIRIDE 2 MG/1
TABLET ORAL
Qty: 30 TABLET | Refills: 0 | Status: SHIPPED | OUTPATIENT
Start: 2023-06-27

## 2023-07-26 ENCOUNTER — OFFICE VISIT (OUTPATIENT)
Dept: INTERNAL MEDICINE | Age: 63
End: 2023-07-26
Payer: COMMERCIAL

## 2023-07-26 VITALS
HEIGHT: 68 IN | SYSTOLIC BLOOD PRESSURE: 120 MMHG | TEMPERATURE: 98.2 F | BODY MASS INDEX: 37.62 KG/M2 | DIASTOLIC BLOOD PRESSURE: 80 MMHG | WEIGHT: 248.2 LBS | OXYGEN SATURATION: 92 % | HEART RATE: 78 BPM

## 2023-07-26 DIAGNOSIS — E11.29 TYPE 2 DIABETES MELLITUS WITH MICROALBUMINURIA, WITHOUT LONG-TERM CURRENT USE OF INSULIN (HCC): Primary | ICD-10-CM

## 2023-07-26 DIAGNOSIS — E13.40 NEUROPATHY DUE TO SECONDARY DIABETES (HCC): ICD-10-CM

## 2023-07-26 DIAGNOSIS — R80.9 TYPE 2 DIABETES MELLITUS WITH MICROALBUMINURIA, WITHOUT LONG-TERM CURRENT USE OF INSULIN (HCC): Primary | ICD-10-CM

## 2023-07-26 LAB — HBA1C MFR BLD: 9.8 %

## 2023-07-26 PROCEDURE — 83037 HB GLYCOSYLATED A1C HOME DEV: CPT

## 2023-07-26 RX ORDER — DULAGLUTIDE 0.75 MG/.5ML
0.75 INJECTION, SOLUTION SUBCUTANEOUS WEEKLY
Qty: 4 ADJUSTABLE DOSE PRE-FILLED PEN SYRINGE | Refills: 1 | Status: SHIPPED | OUTPATIENT
Start: 2023-07-26

## 2023-07-26 ASSESSMENT — ENCOUNTER SYMPTOMS
NAUSEA: 0
ABDOMINAL PAIN: 0
RHINORRHEA: 0
VOICE CHANGE: 0
COUGH: 0
APNEA: 0
SINUS PAIN: 0
ABDOMINAL DISTENTION: 1
EYE ITCHING: 0
BACK PAIN: 0
VOMITING: 0
CHEST TIGHTNESS: 0
CONSTIPATION: 0
SHORTNESS OF BREATH: 0
RESPIRATORY NEGATIVE: 1
WHEEZING: 0
DIARRHEA: 0
STRIDOR: 0
EYE PAIN: 0
EYES NEGATIVE: 1
EYE REDNESS: 0
PHOTOPHOBIA: 0
ALLERGIC/IMMUNOLOGIC NEGATIVE: 1
CHOKING: 0
EYE DISCHARGE: 0
COLOR CHANGE: 0
FACIAL SWELLING: 0

## 2023-07-26 NOTE — PROGRESS NOTES
Patient here for follow-up problems including uncontrolled hypertension and diabetes mellitus. A1c has worsened. Patient is compliant with diet. She is on metformin and Amaryl. She agrees to start Trulicity especially due to her elevated BMI. She is recently teaching. She is advised to get blood sugars. She has microalbuminuria. Last screening 2021. She has not done her urine test that were ordered last time. She is no longer taking benazepril or hydrochlorothiazide since last year per the med list reconciliation that was done today by me. It does appear she is getting Coreg, amlodipine and Aldactone. Advised patient to bring all her medications to the next visit to clarify her med list.  Advised dietary changes. Results for POC orders placed in visit on 07/26/23   POCT glycosylated hemoglobin (Hb A1C)   Result Value Ref Range    Hemoglobin A1C 9.8 (A) %       Attending Physician Statement  I have discussed the care of Malorie Barrera, including pertinent history and exam findings,  with the resident. I have reviewed the key elements of all parts of the encounter with the resident. I agree with the assessment, plan and orders as documented by the resident.   (GE Modifier)
Medicine  7/26/2023, 11:23 AM    This note is created with the assistance of a speech-recognition program. While intending to generate a document that actually reflects the content of thevisit, the document can still have some mistakes which may not have been identified and corrected by editing.

## 2023-08-07 ENCOUNTER — TELEPHONE (OUTPATIENT)
Dept: INTERNAL MEDICINE | Age: 63
End: 2023-08-07

## 2023-08-07 DIAGNOSIS — E11.9 TYPE 2 DIABETES MELLITUS WITHOUT COMPLICATION, WITHOUT LONG-TERM CURRENT USE OF INSULIN (HCC): Chronic | ICD-10-CM

## 2023-08-07 RX ORDER — UBIQUINOL 100 MG
CAPSULE ORAL
Qty: 100 EACH | Refills: 3 | Status: SHIPPED | OUTPATIENT
Start: 2023-08-07

## 2023-08-07 RX ORDER — LANCETS 30 GAUGE
1 EACH MISCELLANEOUS
Qty: 100 EACH | Refills: 3 | Status: SHIPPED | OUTPATIENT
Start: 2023-08-07

## 2023-08-07 RX ORDER — GLUCOSAMINE HCL/CHONDROITIN SU 500-400 MG
CAPSULE ORAL
Qty: 100 STRIP | Refills: 3 | Status: SHIPPED | OUTPATIENT
Start: 2023-08-07

## 2023-08-07 NOTE — TELEPHONE ENCOUNTER
Pt came to office this morning for education on Trulicity pen. Pt states she plans to pick medication up today and start shot. Writer provided education on pen use, dosing, and potential side effects. Pt able to return demonstration with teaching pen accurately. Discussed getting labs completed. Pt states she does not have glucometer and supplies at home, scripts pended.

## 2023-08-08 NOTE — TELEPHONE ENCOUNTER
PC to pt to let her know supplies sent to pharmacy. Pt states she had a little upset stomach yesterday after taking Trulicity for first time. Writer let her know that will typically go away after her body adjusts to it.

## 2023-08-16 DIAGNOSIS — R80.9 TYPE 2 DIABETES MELLITUS WITH MICROALBUMINURIA, WITHOUT LONG-TERM CURRENT USE OF INSULIN (HCC): ICD-10-CM

## 2023-08-16 DIAGNOSIS — I10 UNCONTROLLED HYPERTENSION: ICD-10-CM

## 2023-08-16 DIAGNOSIS — E78.5 DYSLIPIDEMIA: Chronic | ICD-10-CM

## 2023-08-16 DIAGNOSIS — E11.29 TYPE 2 DIABETES MELLITUS WITH MICROALBUMINURIA, WITHOUT LONG-TERM CURRENT USE OF INSULIN (HCC): ICD-10-CM

## 2023-08-16 DIAGNOSIS — I10 ESSENTIAL HYPERTENSION: Chronic | ICD-10-CM

## 2023-08-16 DIAGNOSIS — E11.9 TYPE 2 DIABETES MELLITUS WITHOUT COMPLICATION, WITHOUT LONG-TERM CURRENT USE OF INSULIN (HCC): Chronic | ICD-10-CM

## 2023-08-16 RX ORDER — SPIRONOLACTONE 50 MG/1
50 TABLET, FILM COATED ORAL DAILY
Qty: 30 TABLET | Refills: 5 | Status: SHIPPED | OUTPATIENT
Start: 2023-08-16

## 2023-08-16 RX ORDER — DULAGLUTIDE 0.75 MG/.5ML
0.75 INJECTION, SOLUTION SUBCUTANEOUS WEEKLY
Qty: 4 ADJUSTABLE DOSE PRE-FILLED PEN SYRINGE | Refills: 1 | Status: SHIPPED | OUTPATIENT
Start: 2023-08-16

## 2023-08-16 RX ORDER — GLIMEPIRIDE 2 MG/1
TABLET ORAL
Qty: 30 TABLET | Refills: 0 | Status: SHIPPED | OUTPATIENT
Start: 2023-08-16

## 2023-08-16 RX ORDER — ATORVASTATIN CALCIUM 80 MG/1
80 TABLET, FILM COATED ORAL DAILY
Qty: 90 TABLET | Refills: 0 | Status: SHIPPED | OUTPATIENT
Start: 2023-08-16

## 2023-08-16 RX ORDER — CARVEDILOL 12.5 MG/1
12.5 TABLET ORAL 2 TIMES DAILY WITH MEALS
Qty: 180 TABLET | Refills: 0 | Status: SHIPPED | OUTPATIENT
Start: 2023-08-16

## 2023-08-16 RX ORDER — AMLODIPINE BESYLATE 10 MG/1
10 TABLET ORAL DAILY
Qty: 90 TABLET | Refills: 0 | Status: SHIPPED | OUTPATIENT
Start: 2023-08-16

## 2023-08-16 NOTE — TELEPHONE ENCOUNTER
Patient called in this morning and [facility] and Prosthesis to be sent to [facility] at fax# [phone]. Please advise!
[facility]  Lab Frequency Next Occurrence   RIP DIGITAL SCREEN W OR WO CAD BILATERAL Once 04/05/2023   Lipid Panel Once 04/05/2023   Microalbumin, Ur Once 49/26/1788   Basic Metabolic Panel Once 63/51/1903         Patient Active Problem List:     Seasonal allergies     HTN (hypertension)     Dyslipidemia     Solitary kidney     Cancer of breast ([address])     Hip pain, bilateral     Neuropathy due to secondary diabetes ([address])     Postcoital bleeding secondary to vaginal atrophy     Somatic dysfunction of spine, thoracic     Somatic dysfunction of spine, lumbar     History of hysterectomy for benign disease (fibroids)     History of lumpectomy of left breast     Absence of both cervix and uterus, acquired     Vaginal atrophy     Severe obesity (BMI 35.0-39. 9) with comorbidity ([address])

## 2023-08-17 ENCOUNTER — TELEPHONE (OUTPATIENT)
Dept: INTERNAL MEDICINE | Age: 63
End: 2023-08-17

## 2023-08-17 DIAGNOSIS — Z98.890 HISTORY OF LUMPECTOMY OF LEFT BREAST: Primary | ICD-10-CM

## 2023-08-17 DIAGNOSIS — C50.312 MALIGNANT NEOPLASM OF LOWER-INNER QUADRANT OF LEFT FEMALE BREAST, UNSPECIFIED ESTROGEN RECEPTOR STATUS (HCC): ICD-10-CM

## 2023-08-17 NOTE — TELEPHONE ENCOUNTER
Patient called in this morning and asked for a script for Mastectomy Chet Adorno and Prosthesis to be sent to [facility] at fax# [phone]. Please advise!

## 2023-08-30 NOTE — TELEPHONE ENCOUNTER
Order, insurance and pt demographics faxed to [facility] at [phone] per pt request, see media for fax confirmation.

## 2023-09-05 ENCOUNTER — TELEPHONE (OUTPATIENT)
Dept: INTERNAL MEDICINE | Age: 63
End: 2023-09-05

## 2023-09-05 NOTE — TELEPHONE ENCOUNTER
Received triage call from [facility] ([facility]) stating pt c/o dizziness but needs to reschedule her appt for tomorrow. PC back to pt, she states she had a couple episodes of dizziness last week or the week before and nothing since. Pt has to r/s appt for tomorrow d/t mandatory overtime at work. Appt rescheduled.

## 2023-09-08 DIAGNOSIS — E11.9 TYPE 2 DIABETES MELLITUS WITHOUT COMPLICATION, WITHOUT LONG-TERM CURRENT USE OF INSULIN (HCC): Chronic | ICD-10-CM

## 2023-09-11 RX ORDER — GLIMEPIRIDE 2 MG/1
TABLET ORAL
Qty: 30 TABLET | Refills: 0 | Status: SHIPPED | OUTPATIENT
Start: 2023-09-11

## 2023-09-11 NOTE — TELEPHONE ENCOUNTER
Pharmacy requesting refills for Glimepiride 2mg tablets. Please review and e-scribe to pharmacy listed in chart if appropriate. Thank you.       Next Visit Date: 9/13/23  Last Visit Date: 7/26/23    Future Appointments   Date Time Provider [address]   9/12/2023  9:00 AM STA SCR MAMMO RM 2 STAZ MAMMO STA Radiolog   9/13/2023  8:50 AM Mable Antonio MD [address] Maintenance   Topic Date Due    Diabetic retinal exam  10/28/2016    Hepatitis B vaccine (1 of 3 - Risk 3-dose series) Never done    COVID-19 Vaccine (3 - Pfizer series) 09/20/2021    Diabetic Alb to Cr ratio (uACR) test  11/01/2022    Lipids  11/01/2022    Breast cancer screen  12/21/2022    GFR test (Diabetes, CKD 3-4, OR last GFR 15-59)  06/14/2023    Flu vaccine (1) 08/01/2023    Diabetic foot exam  08/24/2023    Shingles vaccine (1 of 2) 05/02/2024 (Originally 9/1/2008)    A1C test (Diabetic or Prediabetic)  10/26/2023    Depression Monitoring  04/05/2024    Colorectal Cancer Screen  05/11/2026    DTaP/Tdap/Td vaccine (2 - Td or Tdap) 04/13/2031    DEXA (modify frequency per FRAX score)  Completed    Pneumococcal 65+ years Vaccine  Completed    Hepatitis C screen  Completed    HIV screen  Completed    Hepatitis A vaccine  Aged Out    Hib vaccine  Aged Out    Meningococcal (ACWY) vaccine  Aged Out    Depression Screen  Discontinued    Pneumococcal 0-64 years Vaccine  Discontinued       Hemoglobin A1C (%)   Date Value   07/26/2023 9.8 (A)   04/05/2023 7.1   08/24/2022 9.1             ( goal A1C is < 7)   No components found for: \"LABMICR\"  LDL Cholesterol (mg/dL)   Date Value   11/01/2021 85       (goal LDL is <100)   AST (U/L)   Date Value   01/11/2019 11     ALT (U/L)   Date Value   01/11/2019 13     BUN (mg/dL)   Date Value   06/14/2022 27 (H)     BP Readings from Last 3 Encounters:   07/26/23 120/80   04/05/23 (!) 162/98   08/24/22 (!) 141/98          (goal 120/80)    All Future Testing planned in [facility]  Lab Frequency

## 2023-09-13 ENCOUNTER — HOSPITAL ENCOUNTER (OUTPATIENT)
Age: 63
Setting detail: SPECIMEN
Discharge: HOME | End: 2023-09-13

## 2023-09-13 ENCOUNTER — OFFICE VISIT (OUTPATIENT)
Dept: INTERNAL MEDICINE | Age: 63
End: 2023-09-13

## 2023-09-13 VITALS
OXYGEN SATURATION: 99 % | WEIGHT: 242.6 LBS | HEART RATE: 87 BPM | SYSTOLIC BLOOD PRESSURE: 132 MMHG | DIASTOLIC BLOOD PRESSURE: 80 MMHG | BODY MASS INDEX: 36.77 KG/M2 | TEMPERATURE: 97.7 F | HEIGHT: 68 IN

## 2023-09-13 DIAGNOSIS — M25.511 CHRONIC PAIN OF BOTH SHOULDERS: ICD-10-CM

## 2023-09-13 DIAGNOSIS — E11.29 TYPE 2 DIABETES MELLITUS WITH MICROALBUMINURIA, WITHOUT LONG-TERM CURRENT USE OF INSULIN (HCC): Primary | ICD-10-CM

## 2023-09-13 DIAGNOSIS — M75.01 ADHESIVE CAPSULITIS OF BOTH SHOULDERS: ICD-10-CM

## 2023-09-13 DIAGNOSIS — M25.512 CHRONIC PAIN OF BOTH SHOULDERS: ICD-10-CM

## 2023-09-13 DIAGNOSIS — R80.9 TYPE 2 DIABETES MELLITUS WITH MICROALBUMINURIA, WITHOUT LONG-TERM CURRENT USE OF INSULIN (HCC): Primary | ICD-10-CM

## 2023-09-13 DIAGNOSIS — J45.20 MILD INTERMITTENT ASTHMA WITHOUT COMPLICATION: ICD-10-CM

## 2023-09-13 DIAGNOSIS — I10 PRIMARY HYPERTENSION: Chronic | ICD-10-CM

## 2023-09-13 DIAGNOSIS — I10 UNCONTROLLED HYPERTENSION: ICD-10-CM

## 2023-09-13 DIAGNOSIS — G89.29 CHRONIC PAIN OF BOTH SHOULDERS: ICD-10-CM

## 2023-09-13 DIAGNOSIS — E11.29 TYPE 2 DIABETES MELLITUS WITH MICROALBUMINURIA, WITHOUT LONG-TERM CURRENT USE OF INSULIN (HCC): ICD-10-CM

## 2023-09-13 DIAGNOSIS — I10 ESSENTIAL HYPERTENSION: Chronic | ICD-10-CM

## 2023-09-13 DIAGNOSIS — M75.02 ADHESIVE CAPSULITIS OF BOTH SHOULDERS: ICD-10-CM

## 2023-09-13 DIAGNOSIS — R80.9 TYPE 2 DIABETES MELLITUS WITH MICROALBUMINURIA, WITHOUT LONG-TERM CURRENT USE OF INSULIN (HCC): ICD-10-CM

## 2023-09-13 LAB
CHOLEST SERPL-MCNC: 156 MG/DL (ref 0–199)
CHOLESTEROL/HDL RATIO: 3
HDLC SERPL-MCNC: 52 MG/DL (ref 0–40)
LDLC SERPL CALC-MCNC: 89 MG/DL (ref 0–100)
TRIGL SERPL-MCNC: 77 MG/DL (ref 0–149)
VLDLC SERPL CALC-MCNC: 15 MG/DL

## 2023-09-13 RX ORDER — DULAGLUTIDE 0.75 MG/.5ML
0.75 INJECTION, SOLUTION SUBCUTANEOUS WEEKLY
Qty: 4 ADJUSTABLE DOSE PRE-FILLED PEN SYRINGE | Refills: 1 | Status: CANCELLED | OUTPATIENT
Start: 2023-09-13

## 2023-09-13 RX ORDER — FEXOFENADINE HCL 180 MG/1
180 TABLET ORAL DAILY
Qty: 30 TABLET | Refills: 0 | Status: SHIPPED | OUTPATIENT
Start: 2023-09-13 | End: 2023-10-13

## 2023-09-13 ASSESSMENT — PATIENT HEALTH QUESTIONNAIRE - PHQ9
2. FEELING DOWN, DEPRESSED OR HOPELESS: 0
SUM OF ALL RESPONSES TO PHQ QUESTIONS 1-9: 0
4. FEELING TIRED OR HAVING LITTLE ENERGY: 0
1. LITTLE INTEREST OR PLEASURE IN DOING THINGS: 0
6. FEELING BAD ABOUT YOURSELF - OR THAT YOU ARE A FAILURE OR HAVE LET YOURSELF OR YOUR FAMILY DOWN: 0
SUM OF ALL RESPONSES TO PHQ QUESTIONS 1-9: 0
3. TROUBLE FALLING OR STAYING ASLEEP: 0
SUM OF ALL RESPONSES TO PHQ QUESTIONS 1-9: 0
SUM OF ALL RESPONSES TO PHQ QUESTIONS 1-9: 0
10. IF YOU CHECKED OFF ANY PROBLEMS, HOW DIFFICULT HAVE THESE PROBLEMS MADE IT FOR YOU TO DO YOUR WORK, TAKE CARE OF THINGS AT HOME, OR GET ALONG WITH OTHER PEOPLE: 0
5. POOR APPETITE OR OVEREATING: 0
8. MOVING OR SPEAKING SO SLOWLY THAT OTHER PEOPLE COULD HAVE NOTICED. OR THE OPPOSITE, BEING SO FIGETY OR RESTLESS THAT YOU HAVE BEEN MOVING AROUND A LOT MORE THAN USUAL: 0
SUM OF ALL RESPONSES TO PHQ9 QUESTIONS 1 & 2: 0
9. THOUGHTS THAT YOU WOULD BE BETTER OFF DEAD, OR OF HURTING YOURSELF: 0
7. TROUBLE CONCENTRATING ON THINGS, SUCH AS READING THE NEWSPAPER OR WATCHING TELEVISION: 0

## 2023-09-13 NOTE — PROGRESS NOTES
activities. Denies any numbness tingling or weakness in the arms. Patient Active Problem List   Diagnosis    Seasonal allergies    HTN (hypertension)    Dyslipidemia    Solitary kidney    Cancer of breast (HCC)    Hip pain, bilateral    Neuropathy due to secondary diabetes ([address])    Postcoital bleeding secondary to vaginal atrophy    Somatic dysfunction of spine, thoracic    Somatic dysfunction of spine, lumbar    History of hysterectomy for benign disease (fibroids)    History of lumpectomy of left breast    Absence of both cervix and uterus, acquired    Vaginal atrophy    Severe obesity (BMI 35.0-39. 9) with comorbidity [facility])       Health Maintenance Due   Topic Date Due    Diabetic retinal exam  10/28/2016    Hepatitis B vaccine (1 of 3 - Risk 3-dose series) Never done    COVID-19 Vaccine (3 - Pfizer series) 09/20/2021    Diabetic Alb to Cr ratio (uACR) test  11/01/2022    Lipids  11/01/2022    Breast cancer screen  12/21/2022    GFR test (Diabetes, CKD 3-4, OR last GFR 15-59)  06/14/2023    Flu vaccine (1) 08/01/2023    Diabetic foot exam  08/24/2023       Allergies   Allergen Reactions    Other Nausea Only     Shrimp makes pt nauseated. Denies problems with other shellfish or seafood  Shrimp makes pt nauseated.   Denies problems with other shellfish or seafood         Current Outpatient Medications   Medication Sig Dispense Refill    glimepiride (AMARYL) 2 MG tablet TAKE 1 TABLET BY MOUTH ONCE DAILY IN THE MORNING BEFORE BREAKFAST 30 tablet 0    Mastectomy Bra MISC by Does not apply route 1 each 0    atorvastatin (LIPITOR) 80 MG tablet Take 1 tablet by mouth daily 90 tablet 0    amLODIPine (NORVASC) 10 MG tablet Take 1 tablet by mouth daily 90 tablet 0    carvedilol (COREG) 12.5 MG tablet Take 1 tablet by mouth 2 times daily (with meals) 180 tablet 0    Dulaglutide (TRULICITY) 5.21 DA/0.9PK SOPN Inject 0.75 mg into the skin once a week 4 Adjustable Dose Pre-filled Pen Syringe 1    metFORMIN (GLUCOPHAGE)

## 2023-09-14 ENCOUNTER — TELEPHONE (OUTPATIENT)
Dept: INTERNAL MEDICINE | Age: 63
End: 2023-09-14

## 2023-09-14 LAB
ANION GAP SERPL CALCULATED.3IONS-SCNC: 14 MMOL/L (ref 9–16)
BUN SERPL-MCNC: 29 MG/DL (ref 8–23)
CALCIUM SERPL-MCNC: 10.1 MG/DL (ref 8.6–10.4)
CHLORIDE SERPL-SCNC: 102 MMOL/L (ref 98–107)
CO2 SERPL-SCNC: 23 MMOL/L (ref 20–31)
CREAT SERPL-MCNC: 1 MG/DL (ref 0.5–0.9)
CREAT UR-MCNC: 103 MG/DL (ref 28–217)
GFR SERPL CREATININE-BSD FRML MDRD: >60 ML/MIN/1.73M2
GLUCOSE SERPL-MCNC: 113 MG/DL (ref 74–99)
MICROALBUMIN UR-MCNC: <12 MG/L (ref 0–20)
MICROALBUMIN/CREAT UR-RTO: NORMAL MCG/MG CREAT (ref 0–25)
POTASSIUM SERPL-SCNC: 4.2 MMOL/L (ref 3.7–5.3)
SODIUM SERPL-SCNC: 139 MMOL/L (ref 136–145)

## 2023-09-14 ASSESSMENT — ENCOUNTER SYMPTOMS
ABDOMINAL PAIN: 0
BACK PAIN: 0
VOMITING: 0
CONSTIPATION: 0
NAUSEA: 0
DIARRHEA: 0
ABDOMINAL DISTENTION: 0
SHORTNESS OF BREATH: 0
WHEEZING: 0

## 2023-09-14 NOTE — TELEPHONE ENCOUNTER
Pt calling office requesting prosthesis for her mastectomy to be sent over to her pharmacy pt want a call back.  Please advise

## 2023-10-18 DIAGNOSIS — E11.9 TYPE 2 DIABETES MELLITUS WITHOUT COMPLICATION, WITHOUT LONG-TERM CURRENT USE OF INSULIN (HCC): Chronic | ICD-10-CM

## 2023-10-18 RX ORDER — GLIMEPIRIDE 2 MG/1
TABLET ORAL
Qty: 30 TABLET | Refills: 0 | Status: SHIPPED | OUTPATIENT
Start: 2023-10-18

## 2023-10-18 NOTE — TELEPHONE ENCOUNTER
.. Request for   Requested Prescriptions     Pending Prescriptions Disp Refills    glimepiride (AMARYL) 2 MG tablet [Pharmacy Med Name: Glimepiride 2 MG Oral Tablet] 30 tablet 0     Sig: TAKE 1 TABLET BY MOUTH ONCE DAILY IN THE MORNING BEFORE BREAKFAST    . Please review and e-scribe to pharmacy listed in chart if appropriate. Thank you.       Last Visit Date: 9/13/2023  Next Visit Date: 10/25/2023    Future Appointments   Date Time Provider [address]   10/21/2023 10:00 AM STA MAMMO RM 1 STAZ MAMMO STA Radiolog   10/25/2023 10:30 AM Jada Carlin MD [address] [facility] Maintenance   Topic Date Due    Diabetic retinal exam  10/28/2016    Hepatitis B vaccine (1 of 3 - Risk 3-dose series) Never done    COVID-19 Vaccine (3 - Pfizer series) 09/20/2021    Breast cancer screen  12/21/2022    Flu vaccine (1) 08/01/2023    Diabetic foot exam  08/24/2023    A1C test (Diabetic or Prediabetic)  10/26/2023    Shingles vaccine (1 of 2) 05/02/2024 (Originally 9/1/2008)    Diabetic Alb to Cr ratio (uACR) test  09/13/2024    Lipids  09/13/2024    Depression Screen  09/13/2024    GFR test (Diabetes, CKD 3-4, OR last GFR 15-59)  09/13/2024    Colorectal Cancer Screen  05/11/2026    DTaP/Tdap/Td vaccine (2 - Td or Tdap) 04/13/2031    DEXA (modify frequency per FRAX score)  Completed    Pneumococcal 65+ years Vaccine  Completed    Hepatitis C screen  Completed    HIV screen  Completed    Hepatitis A vaccine  Aged Out    Hib vaccine  Aged Out    Meningococcal (ACWY) vaccine  Aged Out    Depression Monitoring  Discontinued    Pneumococcal 0-64 years Vaccine  Discontinued       Hemoglobin A1C (%)   Date Value   07/26/2023 9.8 (A)   04/05/2023 7.1   08/24/2022 9.1             ( goal A1C is < 7)   No components found for: \"LABMICR\"  LDL Cholesterol (mg/dL)   Date Value   09/13/2023 89       (goal LDL is <100)   AST (U/L)   Date Value   01/11/2019 11     ALT (U/L)   Date Value   01/11/2019 13     BUN (mg/dL)   Date

## 2023-10-21 ENCOUNTER — HOSPITAL ENCOUNTER (OUTPATIENT)
Dept: MAMMOGRAPHY | Age: 63
End: 2023-10-21
Attending: STUDENT IN AN ORGANIZED HEALTH CARE EDUCATION/TRAINING PROGRAM
Payer: COMMERCIAL

## 2023-10-21 ENCOUNTER — ANCILLARY ORDERS (OUTPATIENT)
Dept: INTERNAL MEDICINE | Age: 63
End: 2023-10-21

## 2023-10-21 VITALS — HEIGHT: 68 IN | BODY MASS INDEX: 36.68 KG/M2 | WEIGHT: 242 LBS

## 2023-10-21 DIAGNOSIS — C50.312 MALIGNANT NEOPLASM OF LOWER-INNER QUADRANT OF LEFT FEMALE BREAST, UNSPECIFIED ESTROGEN RECEPTOR STATUS (HCC): ICD-10-CM

## 2023-10-21 DIAGNOSIS — Z12.31 ENCOUNTER FOR SCREENING MAMMOGRAM FOR HIGH-RISK PATIENT: Primary | ICD-10-CM

## 2023-10-21 PROCEDURE — 77063 BREAST TOMOSYNTHESIS BI: CPT

## 2023-11-01 ENCOUNTER — OFFICE VISIT (OUTPATIENT)
Dept: INTERNAL MEDICINE | Age: 63
End: 2023-11-01
Payer: COMMERCIAL

## 2023-11-01 VITALS
SYSTOLIC BLOOD PRESSURE: 112 MMHG | BODY MASS INDEX: 37.83 KG/M2 | DIASTOLIC BLOOD PRESSURE: 89 MMHG | TEMPERATURE: 98.3 F | WEIGHT: 248.8 LBS | OXYGEN SATURATION: 97 % | HEART RATE: 79 BPM

## 2023-11-01 DIAGNOSIS — E11.42 TYPE 2 DIABETES MELLITUS WITH DIABETIC POLYNEUROPATHY, WITHOUT LONG-TERM CURRENT USE OF INSULIN (HCC): Primary | ICD-10-CM

## 2023-11-01 DIAGNOSIS — M25.511 CHRONIC RIGHT SHOULDER PAIN: ICD-10-CM

## 2023-11-01 DIAGNOSIS — E66.01 SEVERE OBESITY (BMI 35.0-39.9) WITH COMORBIDITY (HCC): ICD-10-CM

## 2023-11-01 DIAGNOSIS — I10 PRIMARY HYPERTENSION: Chronic | ICD-10-CM

## 2023-11-01 DIAGNOSIS — G89.29 CHRONIC RIGHT SHOULDER PAIN: ICD-10-CM

## 2023-11-01 DIAGNOSIS — M67.911 TENDINOPATHY OF RIGHT ROTATOR CUFF: ICD-10-CM

## 2023-11-01 PROCEDURE — 3046F HEMOGLOBIN A1C LEVEL >9.0%: CPT

## 2023-11-01 PROCEDURE — 99213 OFFICE O/P EST LOW 20 MIN: CPT

## 2023-11-01 PROCEDURE — 83036 HEMOGLOBIN GLYCOSYLATED A1C: CPT

## 2023-11-01 PROCEDURE — 1123F ACP DISCUSS/DSCN MKR DOCD: CPT

## 2023-11-01 PROCEDURE — 3079F DIAST BP 80-89 MM HG: CPT

## 2023-11-01 PROCEDURE — 3074F SYST BP LT 130 MM HG: CPT

## 2023-11-01 ASSESSMENT — ENCOUNTER SYMPTOMS
NAUSEA: 1
DIARRHEA: 0
COUGH: 0
CONSTIPATION: 0
SHORTNESS OF BREATH: 0
ABDOMINAL DISTENTION: 0
VOMITING: 0

## 2023-11-01 NOTE — PROGRESS NOTES
[facility]  [facility] [address]   [address] [facility] [address]  Dept: [phone]  Dept Fax: [phone]    Office Progress/Follow Up Note  Date ofpatient's visit: 11/1/2023  Patient's Name:  Chayito Jalloh YOB: 1960            Patient Care Team:  Florinda Spatz, MD as PCP - General (Internal Medicine)  Kathi Ledesma MD as PCP - Empaneled Provider  ================================================================    REASON FOR VISIT/CHIEF COMPLAINT:  Diabetes    HISTORY OF PRESENTING ILLNESS:  History was obtained from: patient, electronic medical record. Josiah sims 70 y.o. is here for a follow-up on diabetes management. Type 2 diabetes mellitus  Last A1c 9.8 in July 2023  She is on 1000 Mg twice daily of metformin, amaryl 2 Mg daily and Trulicity 1.5 Mg once weekly. Patient reports that she has been compliant with her medications, but not with her diet. She has been working third shifts and is also working on renovating her house and has been stress eating a lot. In the previous visit she lost 6 pound but this time she regained 4 pounds. Has some nausea after Trulicity but is able to tolerate it. Hemoglobin A1c ordered. Denies any signs of low blood sugar including chest discomfort, breathing difficulty lightheadedness, blurry vision or palpitations. Denies any other active complaints. She has been having right shoulder pain, ongoing for several years. The pain is present throughout the day but is worse at night. She describes it as throbbing pain at nighttime. She is working on her home renovation, and feels the pain in her shoulder with the movements. She also works at [address] [place] [address], [address] [place] [address]. She has difficulty carrying out activities especially overhead activities. Her shoulder pain is more in the anterior part of shoulder joint radiating, to neck and right upper back.   Sometimes she has numbness in her right little finger

## 2023-11-10 DIAGNOSIS — E78.5 DYSLIPIDEMIA: Chronic | ICD-10-CM

## 2023-11-13 RX ORDER — ATORVASTATIN CALCIUM 80 MG/1
80 TABLET, FILM COATED ORAL DAILY
Qty: 90 TABLET | Refills: 0 | Status: SHIPPED | OUTPATIENT
Start: 2023-11-13

## 2023-11-13 NOTE — TELEPHONE ENCOUNTER
Request for   Requested Prescriptions     Pending Prescriptions Disp Refills    atorvastatin (LIPITOR) 80 MG tablet [Pharmacy Med Name: Atorvastatin Calcium 80 MG Oral Tablet] 90 tablet 0     Sig: Take 1 tablet by mouth once daily    . Please review and e-scribe to pharmacy listed in chart if appropriate. Thank you.       Last Visit Date: 11/1/2023  Next Visit Date: Visit date not found

## 2023-11-24 DIAGNOSIS — E11.9 TYPE 2 DIABETES MELLITUS WITHOUT COMPLICATION, WITHOUT LONG-TERM CURRENT USE OF INSULIN (HCC): Chronic | ICD-10-CM

## 2023-11-24 NOTE — TELEPHONE ENCOUNTER
.. Request for   Requested Prescriptions     Pending Prescriptions Disp Refills    glimepiride (AMARYL) 2 MG tablet [Pharmacy Med Name: Glimepiride 2 MG Oral Tablet] 30 tablet 0     Sig: TAKE 1 TABLET BY MOUTH ONCE DAILY IN THE MORNING BEFORE BREAKFAST    . Please review and e-scribe to pharmacy listed in chart if appropriate. Thank you. Last Visit Date: 11/1/2023  Next Visit Date: Visit date not found    No future appointments.     Health Maintenance   Topic Date Due    Diabetic retinal exam  10/28/2016    Hepatitis B vaccine (1 of 3 - Risk 3-dose series) Never done    Flu vaccine (1) 08/01/2023    Diabetic foot exam  08/24/2023    COVID-19 Vaccine (3 - 2023-24 season) 09/01/2023    A1C test (Diabetic or Prediabetic)  10/26/2023    Shingles vaccine (1 of 2) 05/02/2024 (Originally 9/1/2008)    Diabetic Alb to Cr ratio (uACR) test  09/13/2024    Lipids  09/13/2024    Depression Screen  09/13/2024    GFR test (Diabetes, CKD 3-4, OR last GFR 15-59)  09/13/2024    Breast cancer screen  10/21/2024    Colorectal Cancer Screen  05/11/2026    DTaP/Tdap/Td vaccine (2 - Td or Tdap) 04/13/2031    DEXA (modify frequency per FRAX score)  Completed    Pneumococcal 65+ years Vaccine  Completed    Hepatitis C screen  Completed    HIV screen  Completed    Hepatitis A vaccine  Aged Out    Hib vaccine  Aged Out    Meningococcal (ACWY) vaccine  Aged Out    Depression Monitoring  Discontinued    Pneumococcal 0-64 years Vaccine  Discontinued       Hemoglobin A1C (%)   Date Value   07/26/2023 9.8 (A)   04/05/2023 7.1   08/24/2022 9.1             ( goal A1C is < 7)   No components found for: \"LABMICR\"  LDL Cholesterol (mg/dL)   Date Value   09/13/2023 89       (goal LDL is <100)   AST (U/L)   Date Value   01/11/2019 11     ALT (U/L)   Date Value   01/11/2019 13     BUN (mg/dL)   Date Value   09/13/2023 29 (H)     BP Readings from Last 3 Encounters:   11/01/23 112/89   09/13/23 132/80   07/26/23 120/80          (goal 120/80)    All

## 2023-11-27 RX ORDER — GLIMEPIRIDE 2 MG/1
TABLET ORAL
Qty: 30 TABLET | Refills: 0 | Status: SHIPPED | OUTPATIENT
Start: 2023-11-27

## 2023-12-26 DIAGNOSIS — R80.9 TYPE 2 DIABETES MELLITUS WITH MICROALBUMINURIA, WITHOUT LONG-TERM CURRENT USE OF INSULIN (HCC): ICD-10-CM

## 2023-12-26 DIAGNOSIS — E11.29 TYPE 2 DIABETES MELLITUS WITH MICROALBUMINURIA, WITHOUT LONG-TERM CURRENT USE OF INSULIN (HCC): ICD-10-CM

## 2023-12-26 NOTE — TELEPHONE ENCOUNTER
A Refill Has Been Requested for Valerie Garg    Medication Requested  Requested Prescriptions     Pending Prescriptions Disp Refills    TRULICITY 1.5 RF/5.7YA SC injection [Pharmacy Med Name: Trulicity 1.5 DA/1.3LU Subcutaneous Solution Pen-injector] 4 mL 0     Sig: INJECT 1/2 (ONE-HALF) ML SUBCUTANEOUSLY  ONCE A WEEK       Last Visit Date (If Applicable)  Visit date not found    Next Visit Date (If Applicable)  Visit date not found

## 2023-12-27 RX ORDER — DULAGLUTIDE 1.5 MG/.5ML
INJECTION, SOLUTION SUBCUTANEOUS
Qty: 4 ML | Refills: 0 | Status: SHIPPED | OUTPATIENT
Start: 2023-12-27

## 2023-12-29 DIAGNOSIS — E11.9 TYPE 2 DIABETES MELLITUS WITHOUT COMPLICATION, WITHOUT LONG-TERM CURRENT USE OF INSULIN (HCC): Chronic | ICD-10-CM

## 2023-12-29 RX ORDER — GLIMEPIRIDE 2 MG/1
TABLET ORAL
Qty: 30 TABLET | Refills: 0 | Status: SHIPPED | OUTPATIENT
Start: 2023-12-29

## 2023-12-29 NOTE — TELEPHONE ENCOUNTER
Encounters:   11/01/23 112/89   09/13/23 132/80   07/26/23 120/80          (goal 120/80)    All Future Testing planned in [facility]  Lab Frequency Next Occurrence   XR SHOULDER RIGHT (MIN 2 VIEWS) Once 55/46/2447   Basic Metabolic Panel Once 96/69/4320   Hemoglobin A1C Once 11/01/2023   RIP LEONIDAS DIGITAL SCREEN SELF REFERRAL W OR WO CAD BILATERAL Once 11/09/2023         Patient Active Problem List:     Seasonal allergies     HTN (hypertension)     Dyslipidemia     Solitary kidney     H/O malignant neoplasm of breast     Hip pain, bilateral     Neuropathy due to secondary diabetes ([address])     Postcoital bleeding secondary to vaginal atrophy     Somatic dysfunction of spine, thoracic     Somatic dysfunction of spine, lumbar     History of hysterectomy for benign disease (fibroids)     History of lumpectomy of left breast     Absence of both cervix and uterus, acquired     Vaginal atrophy     Severe obesity (BMI 35.0-39. 9) with comorbidity ([address])     Type 2 diabetes mellitus with diabetic polyneuropathy, without long-term current use of insulin ([address])

## 2024-02-04 DIAGNOSIS — E11.9 TYPE 2 DIABETES MELLITUS WITHOUT COMPLICATION, WITHOUT LONG-TERM CURRENT USE OF INSULIN (HCC): Chronic | ICD-10-CM

## 2024-02-05 NOTE — TELEPHONE ENCOUNTER
Keila Segundo is calling to request a refill on the following medication(s):    Medication Request:  Requested Prescriptions     Pending Prescriptions Disp Refills    glimepiride (AMARYL) 2 MG tablet [Pharmacy Med Name: Glimepiride 2 MG Oral Tablet] 30 tablet 0     Sig: TAKE 1 TABLET BY MOUTH ONCE DAILY IN THE MORNING BEFORE BREAKFAST       Last Visit Date (If Applicable):  11/1/2023    Next Visit Date:    Visit date not found

## 2024-02-09 RX ORDER — GLIMEPIRIDE 2 MG/1
TABLET ORAL
Qty: 30 TABLET | Refills: 5 | Status: SHIPPED | OUTPATIENT
Start: 2024-02-09

## 2024-02-24 DIAGNOSIS — E78.5 DYSLIPIDEMIA: Chronic | ICD-10-CM

## 2024-02-26 NOTE — TELEPHONE ENCOUNTER
A Refill Has Been Requested for Keila Segundo    Medication Requested  Requested Prescriptions     Pending Prescriptions Disp Refills    atorvastatin (LIPITOR) 80 MG tablet [Pharmacy Med Name: Atorvastatin Calcium 80 MG Oral Tablet] 90 tablet 0     Sig: Take 1 tablet by mouth once daily       Last Visit Date (If Applicable)  11/1/2023    Next Visit Date (If Applicable)  Visit date not found

## 2024-03-01 RX ORDER — ATORVASTATIN CALCIUM 80 MG/1
80 TABLET, FILM COATED ORAL DAILY
Qty: 90 TABLET | Refills: 0 | Status: SHIPPED | OUTPATIENT
Start: 2024-03-01

## 2024-03-05 DIAGNOSIS — R80.9 TYPE 2 DIABETES MELLITUS WITH MICROALBUMINURIA, WITHOUT LONG-TERM CURRENT USE OF INSULIN (HCC): ICD-10-CM

## 2024-03-05 DIAGNOSIS — E11.29 TYPE 2 DIABETES MELLITUS WITH MICROALBUMINURIA, WITHOUT LONG-TERM CURRENT USE OF INSULIN (HCC): ICD-10-CM

## 2024-03-05 RX ORDER — DULAGLUTIDE 1.5 MG/.5ML
INJECTION, SOLUTION SUBCUTANEOUS
Qty: 4 ML | Refills: 0 | Status: SHIPPED | OUTPATIENT
Start: 2024-03-05

## 2024-03-05 NOTE — TELEPHONE ENCOUNTER
..Request for   Requested Prescriptions     Pending Prescriptions Disp Refills    dulaglutide (TRULICITY) 1.5 MG/0.5ML SC injection 4 mL 0     Sig: INJECT 1/2 (ONE-HALF) ML SUBCUTANEOUSLY  ONCE A WEEK    .      Please review and e-scribe to pharmacy listed in chart if appropriate. Thank you.      Last Visit Date: 11/1/2023  Next Visit Date: Visit date not found    No future appointments.    Health Maintenance   Topic Date Due    Diabetic retinal exam  10/28/2016    Respiratory Syncytial Virus (RSV) Pregnant or age 60 yrs+ (1 - 1-dose 60+ series) Never done    Flu vaccine (1) 08/01/2023    Diabetic foot exam  08/24/2023    COVID-19 Vaccine (3 - 2023-24 season) 09/01/2023    A1C test (Diabetic or Prediabetic)  10/26/2023    Shingles vaccine (1 of 2) 05/02/2024 (Originally 9/1/2008)    Diabetic Alb to Cr ratio (uACR) test  09/13/2024    Lipids  09/13/2024    Depression Screen  09/13/2024    GFR test (Diabetes, CKD 3-4, OR last GFR 15-59)  09/13/2024    Breast cancer screen  10/21/2024    Colorectal Cancer Screen  05/11/2026    DTaP/Tdap/Td vaccine (2 - Td or Tdap) 04/13/2031    DEXA (modify frequency per FRAX score)  Completed    Pneumococcal 65+ years Vaccine  Completed    Hepatitis C screen  Completed    HIV screen  Completed    Hepatitis A vaccine  Aged Out    Hepatitis B vaccine  Aged Out    Hib vaccine  Aged Out    Polio vaccine  Aged Out    Meningococcal (ACWY) vaccine  Aged Out    Depression Monitoring  Discontinued    Pneumococcal 0-64 years Vaccine  Discontinued       Hemoglobin A1C (%)   Date Value   07/26/2023 9.8 (A)   04/05/2023 7.1   08/24/2022 9.1             ( goal A1C is < 7)   No components found for: \"LABMICR\"  LDL Cholesterol (mg/dL)   Date Value   09/13/2023 89       (goal LDL is <100)   AST (U/L)   Date Value   01/11/2019 11     ALT (U/L)   Date Value   01/11/2019 13     BUN (mg/dL)   Date Value   09/13/2023 29 (H)     BP Readings from Last 3 Encounters:   11/01/23 112/89   09/13/23 132/80

## 2024-03-07 ENCOUNTER — TELEPHONE (OUTPATIENT)
Dept: INTERNAL MEDICINE | Age: 64
End: 2024-03-07

## 2024-03-07 DIAGNOSIS — R80.9 TYPE 2 DIABETES MELLITUS WITH MICROALBUMINURIA, WITHOUT LONG-TERM CURRENT USE OF INSULIN (HCC): ICD-10-CM

## 2024-03-07 DIAGNOSIS — E11.29 TYPE 2 DIABETES MELLITUS WITH MICROALBUMINURIA, WITHOUT LONG-TERM CURRENT USE OF INSULIN (HCC): ICD-10-CM

## 2024-03-07 RX ORDER — DULAGLUTIDE 1.5 MG/.5ML
INJECTION, SOLUTION SUBCUTANEOUS
Qty: 4 ML | Refills: 0 | Status: CANCELLED | OUTPATIENT
Start: 2024-03-07

## 2024-03-07 NOTE — TELEPHONE ENCOUNTER
Pt calling the office for the alternative medication for trulicity, pt stated that the medication is on back order, pt want to know what else can she take? Please advise    Pt also want her breast prosthetic and bra to be faxed ov to

## 2024-04-04 DIAGNOSIS — E78.5 DYSLIPIDEMIA: Chronic | ICD-10-CM

## 2024-04-04 NOTE — TELEPHONE ENCOUNTER
A Refill Has Been Requested for Keila Segundo    Medication Requested  Requested Prescriptions     Pending Prescriptions Disp Refills    atorvastatin (LIPITOR) 80 MG tablet [Pharmacy Med Name: Atorvastatin Calcium 80 MG Oral Tablet] 90 tablet 0     Sig: Take 1 tablet by mouth once daily       Last Visit Date (If Applicable)  11/1/2023    Next Visit Date (If Applicable)  4/17/2024

## 2024-04-11 RX ORDER — ATORVASTATIN CALCIUM 80 MG/1
80 TABLET, FILM COATED ORAL DAILY
Qty: 90 TABLET | Refills: 0 | Status: SHIPPED | OUTPATIENT
Start: 2024-04-11

## 2024-04-15 DIAGNOSIS — I10 UNCONTROLLED HYPERTENSION: ICD-10-CM

## 2024-04-15 DIAGNOSIS — I10 ESSENTIAL HYPERTENSION: Chronic | ICD-10-CM

## 2024-04-15 NOTE — TELEPHONE ENCOUNTER
..Request for   Requested Prescriptions     Pending Prescriptions Disp Refills    amLODIPine (NORVASC) 10 MG tablet [Pharmacy Med Name: amLODIPine Besylate 10 MG Oral Tablet] 90 tablet 0     Sig: Take 1 tablet by mouth once daily    carvedilol (COREG) 12.5 MG tablet [Pharmacy Med Name: Carvedilol 12.5 MG Oral Tablet] 180 tablet 0     Sig: TAKE 1 TABLET BY MOUTH TWICE DAILY WITH MEALS    .      Please review and e-scribe to pharmacy listed in chart if appropriate. Thank you.      Last Visit Date: 11/1/2023  Next Visit Date: 4/17/2024    Future Appointments   Date Time Provider Department Center   4/17/2024  8:30 AM Latha Ferro MD [facility]       Health Maintenance   Topic Date Due    Diabetic retinal exam  10/28/2016    Respiratory Syncytial Virus (RSV) Pregnant or age 60 yrs+ (1 - 1-dose 60+ series) Never done    Diabetic foot exam  08/24/2023    COVID-19 Vaccine (3 - 2023-24 season) 09/01/2023    A1C test (Diabetic or Prediabetic)  10/26/2023    Shingles vaccine (1 of 2) 05/02/2024 (Originally 9/1/2008)    Flu vaccine (Season Ended) 08/01/2024    Diabetic Alb to Cr ratio (uACR) test  09/13/2024    Lipids  09/13/2024    Depression Screen  09/13/2024    GFR test (Diabetes, CKD 3-4, OR last GFR 15-59)  09/13/2024    Breast cancer screen  10/21/2024    Colorectal Cancer Screen  05/11/2026    DTaP/Tdap/Td vaccine (2 - Td or Tdap) 04/13/2031    DEXA (modify frequency per FRAX score)  Completed    Pneumococcal 65+ years Vaccine  Completed    Hepatitis C screen  Completed    HIV screen  Completed    Hepatitis A vaccine  Aged Out    Hepatitis B vaccine  Aged Out    Hib vaccine  Aged Out    Polio vaccine  Aged Out    Meningococcal (ACWY) vaccine  Aged Out    Depression Monitoring  Discontinued    Pneumococcal 0-64 years Vaccine  Discontinued       Hemoglobin A1C (%)   Date Value   07/26/2023 9.8 (A)   04/05/2023 7.1   08/24/2022 9.1             ( goal A1C is < 7)   No components found for: \"LABMICR\"  LDL

## 2024-04-17 ENCOUNTER — OFFICE VISIT (OUTPATIENT)
Dept: INTERNAL MEDICINE | Age: 64
End: 2024-04-17
Payer: COMMERCIAL

## 2024-04-17 ENCOUNTER — HOSPITAL ENCOUNTER (OUTPATIENT)
Age: 64
Setting detail: SPECIMEN
Discharge: HOME | End: 2024-04-17

## 2024-04-17 VITALS
SYSTOLIC BLOOD PRESSURE: 128 MMHG | RESPIRATION RATE: 16 BRPM | OXYGEN SATURATION: 98 % | WEIGHT: 247 LBS | HEIGHT: 68 IN | TEMPERATURE: 97 F | BODY MASS INDEX: 37.44 KG/M2 | HEART RATE: 77 BPM | DIASTOLIC BLOOD PRESSURE: 76 MMHG

## 2024-04-17 DIAGNOSIS — J30.2 SEASONAL ALLERGIES: ICD-10-CM

## 2024-04-17 DIAGNOSIS — G47.33 OSA (OBSTRUCTIVE SLEEP APNEA): ICD-10-CM

## 2024-04-17 DIAGNOSIS — R91.8 PULMONARY NODULES: ICD-10-CM

## 2024-04-17 DIAGNOSIS — E11.65 TYPE 2 DIABETES MELLITUS WITH HYPERGLYCEMIA, WITHOUT LONG-TERM CURRENT USE OF INSULIN (HCC): ICD-10-CM

## 2024-04-17 DIAGNOSIS — J45.20 MILD INTERMITTENT ASTHMA IN ADULT WITHOUT COMPLICATION: ICD-10-CM

## 2024-04-17 DIAGNOSIS — E66.01 SEVERE OBESITY (BMI 35.0-39.9) WITH COMORBIDITY (HCC): ICD-10-CM

## 2024-04-17 DIAGNOSIS — Z85.3 H/O MALIGNANT NEOPLASM OF BREAST: ICD-10-CM

## 2024-04-17 DIAGNOSIS — I10 ESSENTIAL HYPERTENSION: ICD-10-CM

## 2024-04-17 DIAGNOSIS — E11.42 TYPE 2 DIABETES MELLITUS WITH DIABETIC POLYNEUROPATHY, WITHOUT LONG-TERM CURRENT USE OF INSULIN (HCC): ICD-10-CM

## 2024-04-17 DIAGNOSIS — E11.65 TYPE 2 DIABETES MELLITUS WITH HYPERGLYCEMIA, WITHOUT LONG-TERM CURRENT USE OF INSULIN (HCC): Primary | ICD-10-CM

## 2024-04-17 DIAGNOSIS — C50.312 MALIGNANT NEOPLASM OF LOWER-INNER QUADRANT OF LEFT FEMALE BREAST, UNSPECIFIED ESTROGEN RECEPTOR STATUS (HCC): ICD-10-CM

## 2024-04-17 DIAGNOSIS — I10 PRIMARY HYPERTENSION: Chronic | ICD-10-CM

## 2024-04-17 LAB
ANION GAP SERPL CALCULATED.3IONS-SCNC: 12 MMOL/L (ref 9–16)
BUN SERPL-MCNC: 28 MG/DL (ref 8–23)
CALCIUM SERPL-MCNC: 10.1 MG/DL (ref 8.6–10.4)
CHLORIDE SERPL-SCNC: 104 MMOL/L (ref 98–107)
CO2 SERPL-SCNC: 24 MMOL/L (ref 20–31)
CREAT SERPL-MCNC: 1.1 MG/DL (ref 0.5–0.9)
CREAT UR-MCNC: 60.2 MG/DL (ref 28–217)
GFR SERPL CREATININE-BSD FRML MDRD: 58 ML/MIN/1.73M2
GLUCOSE SERPL-MCNC: 89 MG/DL (ref 74–99)
HBA1C MFR BLD: 7.4 %
MICROALBUMIN UR-MCNC: <12 MG/L (ref 0–20)
MICROALBUMIN/CREAT UR-RTO: NORMAL MCG/MG CREAT (ref 0–25)
POTASSIUM SERPL-SCNC: 4.3 MMOL/L (ref 3.7–5.3)
SODIUM SERPL-SCNC: 140 MMOL/L (ref 136–145)

## 2024-04-17 PROCEDURE — 3078F DIAST BP <80 MM HG: CPT

## 2024-04-17 PROCEDURE — 99212 OFFICE O/P EST SF 10 MIN: CPT | Performed by: INTERNAL MEDICINE

## 2024-04-17 PROCEDURE — 1123F ACP DISCUSS/DSCN MKR DOCD: CPT

## 2024-04-17 PROCEDURE — 3051F HG A1C>EQUAL 7.0%<8.0%: CPT

## 2024-04-17 PROCEDURE — 99213 OFFICE O/P EST LOW 20 MIN: CPT

## 2024-04-17 PROCEDURE — 3074F SYST BP LT 130 MM HG: CPT

## 2024-04-17 PROCEDURE — 83036 HEMOGLOBIN GLYCOSYLATED A1C: CPT

## 2024-04-17 RX ORDER — AMLODIPINE BESYLATE 10 MG/1
10 TABLET ORAL DAILY
Qty: 90 TABLET | Refills: 0 | Status: SHIPPED | OUTPATIENT
Start: 2024-04-17

## 2024-04-17 RX ORDER — CARVEDILOL 12.5 MG/1
12.5 TABLET ORAL 2 TIMES DAILY WITH MEALS
Qty: 180 TABLET | Refills: 0 | Status: SHIPPED | OUTPATIENT
Start: 2024-04-17

## 2024-04-17 SDOH — ECONOMIC STABILITY: FOOD INSECURITY: WITHIN THE PAST 12 MONTHS, THE FOOD YOU BOUGHT JUST DIDN'T LAST AND YOU DIDN'T HAVE MONEY TO GET MORE.: NEVER TRUE

## 2024-04-17 SDOH — ECONOMIC STABILITY: FOOD INSECURITY: WITHIN THE PAST 12 MONTHS, YOU WORRIED THAT YOUR FOOD WOULD RUN OUT BEFORE YOU GOT MONEY TO BUY MORE.: NEVER TRUE

## 2024-04-17 SDOH — ECONOMIC STABILITY: INCOME INSECURITY: HOW HARD IS IT FOR YOU TO PAY FOR THE VERY BASICS LIKE FOOD, HOUSING, MEDICAL CARE, AND HEATING?: NOT HARD AT ALL

## 2024-04-17 ASSESSMENT — ENCOUNTER SYMPTOMS
CONSTIPATION: 0
NAUSEA: 0
SHORTNESS OF BREATH: 0
STRIDOR: 0
DIARRHEA: 0
COUGH: 0
WHEEZING: 0
VOMITING: 0

## 2024-04-17 ASSESSMENT — PATIENT HEALTH QUESTIONNAIRE - PHQ9
2. FEELING DOWN, DEPRESSED OR HOPELESS: SEVERAL DAYS
SUM OF ALL RESPONSES TO PHQ QUESTIONS 1-9: 2
SUM OF ALL RESPONSES TO PHQ9 QUESTIONS 1 & 2: 2
1. LITTLE INTEREST OR PLEASURE IN DOING THINGS: SEVERAL DAYS

## 2024-04-17 NOTE — PROGRESS NOTES
Attending Physician Statement  I have discussed the care of Keila Segundo,  including pertinent history and exam findings,  with the resident. I have seen and examined the patient and the key elements of all parts of the encounter have been performed by me.  I agree with the assessment, plan and orders as documented by the resident.  (GC Modifier)    MD SUDHEER Augustine  Attending Physician  Internal Medicine Residency Program, Nephrology   [facility]  4/17/2024, 1:40 PM

## 2024-04-17 NOTE — PROGRESS NOTES
[facility]  [facility]  [address] [place] [address]  Dept: [phone]  Dept Fax: [phone]    Office Progress/Follow Up Note  Date ofpatient's visit: 4/17/2024  Patient's Name:  Keila Segundo YOB: 1960            Patient Care Team:  Latha Ferro MD as PCP - General (Internal Medicine)  Rachel Armstrong MD as PCP - Empaneled Provider  ================================================================    REASON FOR VISIT/CHIEF COMPLAINT:  Follow-up (Patient presents today for routine follow up. Patient has complaints of right foot issues that has been ongoing for a few weeks.)    HISTORY OF PRESENTING ILLNESS:  History was obtained from: patient, electronic medical record. Keila Segundois a 63 y.o. is here for a follow up on the chronic co-morbidities      Type 2 diabetes mellitus  Last A1c 9.8 in July 2023  Today its 7.4   She is on 1000 Mg twice daily of metformin, amaryl 2 Mg daily and Trulicity 1.5 Mg once weekly.  She wasn't able to use trulicity for last 1 month as the pharmacy ran out of it.   compliant with her medications, but not much with her diet Denies any signs of low blood sugar including chest discomfort, breathing difficulty lightheadedness, blurry vision or palpitations.  Denies any other active complaints.  No proteinuria.  List of ophthalmologist given for evaluation of diabetic retinopathy.  She is going to schedule appointment for diabetic foot exam soon    Left breast cancer  S/p Left partial mastectomy about 25 years     Hypertension   Likely related to underlying sleep apnea.   Well controlled on Coreg 12.5 mg twice a day, amlodipine 10 Mg daily and Aldactone 50 Mg daily.     Asthma   Well-controlled  On Breo Ellipta daily.  Has not used albuterol inhaler in past 1 year or so.  She follows with [facility] pulmonology for sleep apnea, asthma.  She was also found to have multiple pulmonary nodules which are stable in

## 2024-05-28 DIAGNOSIS — E11.29 TYPE 2 DIABETES MELLITUS WITH MICROALBUMINURIA, WITHOUT LONG-TERM CURRENT USE OF INSULIN (HCC): ICD-10-CM

## 2024-05-28 DIAGNOSIS — R80.9 TYPE 2 DIABETES MELLITUS WITH MICROALBUMINURIA, WITHOUT LONG-TERM CURRENT USE OF INSULIN (HCC): ICD-10-CM

## 2024-05-28 RX ORDER — DULAGLUTIDE 1.5 MG/.5ML
INJECTION, SOLUTION SUBCUTANEOUS
Qty: 4 ML | Refills: 0 | Status: SHIPPED | OUTPATIENT
Start: 2024-05-28

## 2024-06-17 DIAGNOSIS — R79.89 ELEVATED SERUM CREATININE: Primary | ICD-10-CM

## 2024-06-20 DIAGNOSIS — E11.29 TYPE 2 DIABETES MELLITUS WITH MICROALBUMINURIA, WITHOUT LONG-TERM CURRENT USE OF INSULIN (HCC): ICD-10-CM

## 2024-06-20 DIAGNOSIS — R80.9 TYPE 2 DIABETES MELLITUS WITH MICROALBUMINURIA, WITHOUT LONG-TERM CURRENT USE OF INSULIN (HCC): ICD-10-CM

## 2024-06-20 NOTE — TELEPHONE ENCOUNTER
..Request for   Requested Prescriptions     Pending Prescriptions Disp Refills    TRULICITY 1.5 MG/0.5ML SC injection [Pharmacy Med Name: Trulicity 1.5 MG/0.5ML Subcutaneous Solution Pen-injector] 4 mL 0     Sig: INJECT 1/2 (ONE-HALF) ML SUBCUTANEOUSLY  ONCE A WEEK    .      Please review and e-scribe to pharmacy listed in chart if appropriate. Thank you.      Last Visit Date: 4/17/2024  Next Visit Date: Visit date not found    No future appointments.    Health Maintenance   Topic Date Due    Shingles vaccine (1 of 2) Never done    Diabetic retinal exam  10/28/2016    Respiratory Syncytial Virus (RSV) Pregnant or age 60 yrs+ (1 - 1-dose 60+ series) Never done    Diabetic foot exam  08/24/2023    COVID-19 Vaccine (3 - 2023-24 season) 09/01/2023    Flu vaccine (Season Ended) 08/01/2024    Lipids  09/13/2024    Breast cancer screen  10/21/2024    A1C test (Diabetic or Prediabetic)  04/17/2025    Diabetic Alb to Cr ratio (uACR) test  04/17/2025    Depression Screen  04/17/2025    GFR test (Diabetes, CKD 3-4, OR last GFR 15-59)  04/17/2025    Colorectal Cancer Screen  05/11/2026    DTaP/Tdap/Td vaccine (2 - Td or Tdap) 04/13/2031    DEXA (modify frequency per FRAX score)  Completed    Pneumococcal 65+ years Vaccine  Completed    Hepatitis C screen  Completed    HIV screen  Completed    Hepatitis A vaccine  Aged Out    Hepatitis B vaccine  Aged Out    Hib vaccine  Aged Out    Polio vaccine  Aged Out    Meningococcal (ACWY) vaccine  Aged Out    Depression Monitoring  Discontinued    Pneumococcal 0-64 years Vaccine  Discontinued       Hemoglobin A1C (%)   Date Value   04/17/2024 7.4   07/26/2023 9.8 (A)   04/05/2023 7.1             ( goal A1C is < 7)   No components found for: \"LABMICR\"  No components found for: \"LDLCHOLESTEROL\", \"LDLCALC\"    (goal LDL is <100)   AST (U/L)   Date Value   01/11/2019 11     ALT (U/L)   Date Value   01/11/2019 13     BUN (mg/dL)   Date Value   04/17/2024 28 (H)     BP Readings from Last 3

## 2024-06-22 RX ORDER — DULAGLUTIDE 1.5 MG/.5ML
INJECTION, SOLUTION SUBCUTANEOUS
Qty: 4 ML | Refills: 0 | Status: SHIPPED | OUTPATIENT
Start: 2024-06-22

## 2024-06-23 DIAGNOSIS — I10 ESSENTIAL HYPERTENSION: Chronic | ICD-10-CM

## 2024-06-24 NOTE — TELEPHONE ENCOUNTER
Keila Segundo is calling to request a refill on the following medication(s):    Medication Request:  Requested Prescriptions     Pending Prescriptions Disp Refills    spironolactone (ALDACTONE) 50 MG tablet [Pharmacy Med Name: Spironolactone 50 MG Oral Tablet] 30 tablet 0     Sig: Take 1 tablet by mouth once daily       Last Visit Date (If Applicable):  4/17/2024    Next Visit Date:    Visit date not found

## 2024-06-26 RX ORDER — SPIRONOLACTONE 50 MG/1
50 TABLET, FILM COATED ORAL DAILY
Qty: 30 TABLET | Refills: 0 | Status: SHIPPED | OUTPATIENT
Start: 2024-06-26

## 2024-08-02 DIAGNOSIS — I10 ESSENTIAL HYPERTENSION: Chronic | ICD-10-CM

## 2024-08-02 NOTE — TELEPHONE ENCOUNTER
Keila Segundo is calling to request a refill on the following medication(s):    Medication Request:  Requested Prescriptions     Pending Prescriptions Disp Refills    spironolactone (ALDACTONE) 50 MG tablet 30 tablet 0     Sig: Take 1 tablet by mouth daily       Last Visit Date (If Applicable):  4/17/2024    Next Visit Date:    Visit date not found

## 2024-08-04 RX ORDER — SPIRONOLACTONE 50 MG/1
50 TABLET, FILM COATED ORAL DAILY
Qty: 30 TABLET | Refills: 0 | Status: SHIPPED | OUTPATIENT
Start: 2024-08-04

## 2024-08-09 DIAGNOSIS — R80.9 TYPE 2 DIABETES MELLITUS WITH MICROALBUMINURIA, WITHOUT LONG-TERM CURRENT USE OF INSULIN (HCC): ICD-10-CM

## 2024-08-09 DIAGNOSIS — E11.29 TYPE 2 DIABETES MELLITUS WITH MICROALBUMINURIA, WITHOUT LONG-TERM CURRENT USE OF INSULIN (HCC): ICD-10-CM

## 2024-08-09 NOTE — TELEPHONE ENCOUNTER
Keila Segundo is calling to request a refill on the following medication(s):    Medication Request:  Requested Prescriptions     Pending Prescriptions Disp Refills    dulaglutide (TRULICITY) 1.5 MG/0.5ML SC injection 4 mL 0       Last Visit Date (If Applicable):  4/17/2024    Next Visit Date:    Visit date not found

## 2024-08-12 RX ORDER — DULAGLUTIDE 1.5 MG/.5ML
INJECTION, SOLUTION SUBCUTANEOUS
Qty: 4 ML | Refills: 0 | Status: SHIPPED | OUTPATIENT
Start: 2024-08-12 | End: 2024-08-16 | Stop reason: SDUPTHER

## 2024-08-16 RX ORDER — DULAGLUTIDE 1.5 MG/.5ML
INJECTION, SOLUTION SUBCUTANEOUS
Qty: 4 ML | Refills: 2 | Status: SHIPPED | OUTPATIENT
Start: 2024-08-16

## 2024-08-16 NOTE — TELEPHONE ENCOUNTER
Pt calling for this medication and says pharmacy can't fill with incorrect sig on medication. Med re-pended, please sign again. Thank you!

## 2024-09-30 DIAGNOSIS — E11.9 TYPE 2 DIABETES MELLITUS WITHOUT COMPLICATION, WITHOUT LONG-TERM CURRENT USE OF INSULIN (HCC): Chronic | ICD-10-CM

## 2024-09-30 NOTE — TELEPHONE ENCOUNTER
Keila Segundo is calling to request a refill on the following medication(s):    Medication Request:  Requested Prescriptions     Pending Prescriptions Disp Refills    metFORMIN (GLUCOPHAGE) 1000 MG tablet 180 tablet 0     Sig: Take 1 tablet by mouth 2 times daily (with meals)    fluticasone furoate-vilanterol (BREO ELLIPTA) 100-25 MCG/ACT inhaler       Sig: Inhale 1 puff into the lungs daily    albuterol sulfate HFA (PROVENTIL;VENTOLIN;PROAIR) 108 (90 Base) MCG/ACT inhaler 18 g      Sig: Inhale 2 puffs into the lungs every 4 hours as needed       Last Visit Date (If Applicable):  4/17/2024    Next Visit Date:    10/11/2024

## 2024-10-01 RX ORDER — FLUTICASONE FUROATE AND VILANTEROL 100; 25 UG/1; UG/1
1 POWDER RESPIRATORY (INHALATION) DAILY
Qty: 2 EACH | Refills: 2 | Status: SHIPPED | OUTPATIENT
Start: 2024-10-01

## 2024-10-01 RX ORDER — ALBUTEROL SULFATE 90 UG/1
2 INHALANT RESPIRATORY (INHALATION) EVERY 4 HOURS PRN
Qty: 18 G | Refills: 3 | Status: SHIPPED | OUTPATIENT
Start: 2024-10-01

## 2024-10-08 PROBLEM — R06.02 SHORTNESS OF BREATH: Status: ACTIVE | Noted: 2022-03-03

## 2024-10-08 PROBLEM — R07.89 OTHER CHEST PAIN: Status: ACTIVE | Noted: 2022-03-03

## 2024-10-08 PROBLEM — I50.9 ACUTE CONGESTIVE HEART FAILURE (HCC): Status: ACTIVE | Noted: 2022-03-03

## 2024-11-08 DIAGNOSIS — I10 UNCONTROLLED HYPERTENSION: ICD-10-CM

## 2024-11-08 DIAGNOSIS — I10 ESSENTIAL HYPERTENSION: Chronic | ICD-10-CM

## 2024-11-08 RX ORDER — ALBUTEROL SULFATE 90 UG/1
2 INHALANT RESPIRATORY (INHALATION) EVERY 4 HOURS PRN
Qty: 18 G | Refills: 3 | Status: SHIPPED | OUTPATIENT
Start: 2024-11-08

## 2024-11-08 RX ORDER — AMLODIPINE BESYLATE 10 MG/1
10 TABLET ORAL DAILY
Qty: 90 TABLET | Refills: 0 | Status: SHIPPED | OUTPATIENT
Start: 2024-11-08

## 2024-11-08 RX ORDER — CARVEDILOL 12.5 MG/1
12.5 TABLET ORAL 2 TIMES DAILY WITH MEALS
Qty: 180 TABLET | Refills: 0 | Status: SHIPPED | OUTPATIENT
Start: 2024-11-08

## 2024-11-08 NOTE — TELEPHONE ENCOUNTER
..Request for   Requested Prescriptions     Pending Prescriptions Disp Refills    carvedilol (COREG) 12.5 MG tablet 180 tablet 0     Sig: Take 1 tablet by mouth 2 times daily (with meals)    amLODIPine (NORVASC) 10 MG tablet 90 tablet 0     Sig: Take 1 tablet by mouth daily    albuterol sulfate HFA (PROVENTIL;VENTOLIN;PROAIR) 108 (90 Base) MCG/ACT inhaler 18 g 3     Sig: Inhale 2 puffs into the lungs every 4 hours as needed for Shortness of Breath or Wheezing    .      Please review and e-scribe to pharmacy listed in chart if appropriate. Thank you.      Last Visit Date: 4/17/2024  Next Visit Date: 11/20/2024    Future Appointments   Date Time Provider Department Center   11/20/2024  9:10 AM Shana Majano MD [facility]       Health Maintenance   Topic Date Due    Shingles vaccine (1 of 2) Never done    Diabetic retinal exam  10/28/2016    Respiratory Syncytial Virus (RSV) Pregnant or age 60 yrs+ (1 - 1-dose 60+ series) Never done    Diabetic foot exam  08/24/2023    Flu vaccine (1) 08/01/2024    COVID-19 Vaccine (3 - 2023-24 season) 09/01/2024    Lipids  09/13/2024    Breast cancer screen  10/21/2024    A1C test (Diabetic or Prediabetic)  04/17/2025    Diabetic Alb to Cr ratio (uACR) test  04/17/2025    Depression Screen  04/17/2025    GFR test (Diabetes, CKD 3-4, OR last GFR 15-59)  04/17/2025    Colorectal Cancer Screen  05/11/2026    DTaP/Tdap/Td vaccine (2 - Td or Tdap) 04/13/2031    DEXA (modify frequency per FRAX score)  Completed    Pneumococcal 65+ years Vaccine  Completed    Hepatitis C screen  Completed    Hepatitis A vaccine  Aged Out    Hepatitis B vaccine  Aged Out    Hib vaccine  Aged Out    Polio vaccine  Aged Out    Meningococcal (ACWY) vaccine  Aged Out    Depression Monitoring  Discontinued    Pneumococcal 0-64 years Vaccine  Discontinued    HIV screen  Discontinued       Hemoglobin A1C (%)   Date Value   04/17/2024 7.4   07/26/2023 9.8 (A)   04/05/2023 7.1             ( goal A1C

## 2024-11-11 NOTE — TELEPHONE ENCOUNTER
..Request for   Requested Prescriptions     Pending Prescriptions Disp Refills    dulaglutide (TRULICITY) 1.5 MG/0.5ML SC injection 4 mL 0     Sig: INJECT 1/2 (ONE-HALF) ML SUBCUTANEOUSLY  ONCE A WEEK    .      Please review and e-scribe to pharmacy listed in chart if appropriate. Thank you.      Last Visit Date: 4/17/2024  Next Visit Date: Visit date not found    No future appointments.    Health Maintenance   Topic Date Due    Shingles vaccine (1 of 2) Never done    Diabetic retinal exam  10/28/2016    Respiratory Syncytial Virus (RSV) Pregnant or age 60 yrs+ (1 - 1-dose 60+ series) Never done    Diabetic foot exam  08/24/2023    COVID-19 Vaccine (3 - 2023-24 season) 09/01/2023    Flu vaccine (Season Ended) 08/01/2024    Lipids  09/13/2024    Breast cancer screen  10/21/2024    A1C test (Diabetic or Prediabetic)  04/17/2025    Diabetic Alb to Cr ratio (uACR) test  04/17/2025    Depression Screen  04/17/2025    GFR test (Diabetes, CKD 3-4, OR last GFR 15-59)  04/17/2025    Colorectal Cancer Screen  05/11/2026    DTaP/Tdap/Td vaccine (2 - Td or Tdap) 04/13/2031    DEXA (modify frequency per FRAX score)  Completed    Pneumococcal 65+ years Vaccine  Completed    Hepatitis C screen  Completed    HIV screen  Completed    Hepatitis A vaccine  Aged Out    Hepatitis B vaccine  Aged Out    Hib vaccine  Aged Out    Polio vaccine  Aged Out    Meningococcal (ACWY) vaccine  Aged Out    Depression Monitoring  Discontinued    Pneumococcal 0-64 years Vaccine  Discontinued       Hemoglobin A1C (%)   Date Value   04/17/2024 7.4   07/26/2023 9.8 (A)   04/05/2023 7.1             ( goal A1C is < 7)   No components found for: \"LABMICR\"  No components found for: \"LDLCHOLESTEROL\", \"LDLCALC\"    (goal LDL is <100)   AST (U/L)   Date Value   01/11/2019 11     ALT (U/L)   Date Value   01/11/2019 13     BUN (mg/dL)   Date Value   04/17/2024 28 (H)     BP Readings from Last 3 Encounters:   04/17/24 128/76   11/01/23 112/89   09/13/23 132/80  Ilumya Counseling: I discussed with the patient the risks of tildrakizumab including but not limited to immunosuppression, malignancy, posterior leukoencephalopathy syndrome, and serious infections.  The patient understands that monitoring is required including a PPD at baseline and must alert us or the primary physician if symptoms of infection or other concerning signs are noted. Klisyri Pregnancy And Lactation Text: It is unknown if this medication can harm a developing fetus or if it is excreted in breast milk. Topical Retinoid counseling:  Patient advised to apply a pea-sized amount only at bedtime and wait 30 minutes after washing their face before applying.  If too drying, patient may add a non-comedogenic moisturizer. The patient verbalized understanding of the proper use and possible adverse effects of retinoids.  All of the patient's questions and concerns were addressed. Carac Counseling:  I discussed with the patient the risks of Carac including but not limited to erythema, scaling, itching, weeping, crusting, and pain. Simponi Counseling:  I discussed with the patient the risks of golimumab including but not limited to myelosuppression, immunosuppression, autoimmune hepatitis, demyelinating diseases, lymphoma, and serious infections.  The patient understands that monitoring is required including a PPD at baseline and must alert us or the primary physician if symptoms of infection or other concerning signs are noted. Oral Minoxidil Pregnancy And Lactation Text: This medication should only be used when clearly needed if you are pregnant, attempting to become pregnant or breast feeding. Minoxidil Counseling: Minoxidil is a topical medication which can increase blood flow where it is applied. It is uncertain how this medication increases hair growth. Side effects are uncommon and include stinging and allergic reactions. Azithromycin Pregnancy And Lactation Text: This medication is considered safe during pregnancy and is also secreted in breast milk. Colchicine Counseling:  Patient counseled regarding adverse effects including but not limited to stomach upset (nausea, vomiting, stomach pain, or diarrhea).  Patient instructed to limit alcohol consumption while taking this medication.  Colchicine may reduce blood counts especially with prolonged use.  The patient understands that monitoring of kidney function and blood counts may be required, especially at baseline. The patient verbalized understanding of the proper use and possible adverse effects of colchicine.  All of the patient's questions and concerns were addressed. Doxycycline Counseling:  Patient counseled regarding possible photosensitivity and increased risk for sunburn.  Patient instructed to avoid sunlight, if possible.  When exposed to sunlight, patients should wear protective clothing, sunglasses, and sunscreen.  The patient was instructed to call the office immediately if the following severe adverse effects occur:  hearing changes, easy bruising/bleeding, severe headache, or vision changes.  The patient verbalized understanding of the proper use and possible adverse effects of doxycycline.  All of the patient's questions and concerns were addressed. Ilumya Pregnancy And Lactation Text: The risk during pregnancy and breastfeeding is uncertain with this medication. Topical Retinoid Pregnancy And Lactation Text: This medication is Pregnancy Category C. It is unknown if this medication is excreted in breast milk. Adbry Counseling: I discussed with the patient the risks of tralokinumab including but not limited to eye infection and irritation, cold sores, injection site reactions, worsening of asthma, allergic reactions and increased risk of parasitic infection.  Live vaccines should be avoided while taking tralokinumab. The patient understands that monitoring is required and they must alert us or the primary physician if symptoms of infection or other concerning signs are noted. Minoxidil Pregnancy And Lactation Text: This medication has not been assigned a Pregnancy Risk Category but animal studies failed to show danger with the topical medication. It is unknown if the medication is excreted in breast milk. Tazorac Counseling:  Patient advised that medication is irritating and drying.  Patient may need to apply sparingly and wash off after an hour before eventually leaving it on overnight.  The patient verbalized understanding of the proper use and possible adverse effects of tazorac.  All of the patient's questions and concerns were addressed. Colchicine Pregnancy And Lactation Text: This medication is Pregnancy Category C and isn't considered safe during pregnancy. It is excreted in breast milk. Doxycycline Pregnancy And Lactation Text: This medication is Pregnancy Category D and not consider safe during pregnancy. It is also excreted in breast milk but is considered safe for shorter treatment courses. Otezla Counseling: The side effects of Otezla were discussed with the patient, including but not limited to worsening or new depression, weight loss, diarrhea, nausea, upper respiratory tract infection, and headache. Patient instructed to call the office should any adverse effect occur.  The patient verbalized understanding of the proper use and possible adverse effects of Otezla.  All the patient's questions and concerns were addressed. Carac Pregnancy And Lactation Text: This medication is Pregnancy Category X and contraindicated in pregnancy and in women who may become pregnant. It is unknown if this medication is excreted in breast milk. Bactrim Counseling:  I discussed with the patient the risks of sulfa antibiotics including but not limited to GI upset, allergic reaction, drug rash, diarrhea, dizziness, photosensitivity, and yeast infections.  Rarely, more serious reactions can occur including but not limited to aplastic anemia, agranulocytosis, methemoglobinemia, blood dyscrasias, liver or kidney failure, lung infiltrates or desquamative/blistering drug rashes. Dapsone Counseling: I discussed with the patient the risks of dapsone including but not limited to hemolytic anemia, agranulocytosis, rashes, methemoglobinemia, kidney failure, peripheral neuropathy, headaches, GI upset, and liver toxicity.  Patients who start dapsone require monitoring including baseline LFTs and weekly CBCs for the first month, then every month thereafter.  The patient verbalized understanding of the proper use and possible adverse effects of dapsone.  All of the patient's questions and concerns were addressed. Calcipotriene Counseling:  I discussed with the patient the risks of calcipotriene including but not limited to erythema, scaling, itching, and irritation. Otezla Pregnancy And Lactation Text: This medication is Pregnancy Category C and it isn't known if it is safe during pregnancy. It is unknown if it is excreted in breast milk. Skyrizi Counseling: I discussed with the patient the risks of risankizumab-rzaa including but not limited to immunosuppression, and serious infections.  The patient understands that monitoring is required including a PPD at baseline and must alert us or the primary physician if symptoms of infection or other concerning signs are noted. Adbry Pregnancy And Lactation Text: It is unknown if this medication will adversely affect pregnancy or breast feeding. Infliximab Counseling:  I discussed with the patient the risks of infliximab including but not limited to myelosuppression, immunosuppression, autoimmune hepatitis, demyelinating diseases, lymphoma, and serious infections.  The patient understands that monitoring is required including a PPD at baseline and must alert us or the primary physician if symptoms of infection or other concerning signs are noted. Wartpeel Counseling:  I discussed with the patient the risks of Wartpeel including but not limited to erythema, scaling, itching, weeping, crusting, and pain. Bimzelx Counseling:  I discussed with the patient the risks of Bimzelx including but not limited to depression, immunosuppression, allergic reactions and infections.  The patient understands that monitoring is required including a PPD at baseline and must alert us or the primary physician if symptoms of infection or other concerning signs are noted. Tazorac Pregnancy And Lactation Text: This medication is not safe during pregnancy. It is unknown if this medication is excreted in breast milk. Bactrim Pregnancy And Lactation Text: This medication is Pregnancy Category D and is known to cause fetal risk.  It is also excreted in breast milk. Mirvaso Counseling: Mirvaso is a topical medication which can decrease superficial blood flow where applied. Side effects are uncommon and include stinging, redness and allergic reactions. Dutasteride Male Counseling: Dutasteride Counseling:  I discussed with the patient the risks of use of dutasteride including but not limited to decreased libido, decreased ejaculate volume, and gynecomastia. Women who can become pregnant should not handle medication.  All of the patient's questions and concerns were addressed. Erythromycin Counseling:  I discussed with the patient the risks of erythromycin including but not limited to GI upset, allergic reaction, drug rash, diarrhea, increase in liver enzymes, and yeast infections. Cephalexin Counseling: I counseled the patient regarding use of cephalexin as an antibiotic for prophylactic and/or therapeutic purposes. Cephalexin (commonly prescribed under brand name Keflex) is a cephalosporin antibiotic which is active against numerous classes of bacteria, including most skin bacteria. Side effects may include nausea, diarrhea, gastrointestinal upset, rash, hives, yeast infections, and in rare cases, hepatitis, kidney disease, seizures, fever, confusion, neurologic symptoms, and others. Patients with severe allergies to penicillin medications are cautioned that there is about a 10% incidence of cross-reactivity with cephalosporins. When possible, patients with penicillin allergies should use alternatives to cephalosporins for antibiotic therapy. Calcipotriene Pregnancy And Lactation Text: The use of this medication during pregnancy or lactation is not recommended as there is insufficient data. Erythromycin Pregnancy And Lactation Text: This medication is Pregnancy Category B and is considered safe during pregnancy. It is also excreted in breast milk. Dutasteride Female Counseling: Dutasteride Counseling:  I discussed with the patient the risks of use of dutasteride including but not limited to decreased libido and sexual dysfunction. Explained the teratogenic nature of the medication and stressed the importance of not getting pregnant during treatment. All of the patient's questions and concerns were addressed. Oxybutynin Counseling:  I discussed with the patient the risks of oxybutynin including but not limited to skin rash, drowsiness, dry mouth, difficulty urinating, and blurred vision. Mirvaso Pregnancy And Lactation Text: This medication has not been assigned a Pregnancy Risk Category. It is unknown if the medication is excreted in breast milk. Opioid Counseling: I discussed with the patient the potential side effects of opioids including but not limited to addiction, altered mental status, and depression. I stressed avoiding alcohol, benzodiazepines, muscle relaxants and sleep aids unless specifically okayed by a physician. The patient verbalized understanding of the proper use and possible adverse effects of opioids. All of the patient's questions and concerns were addressed. They were instructed to flush the remaining pills down the toilet if they did not need them for pain. Dapsone Pregnancy And Lactation Text: This medication is Pregnancy Category C and is not considered safe during pregnancy or breast feeding. Infliximab Pregnancy And Lactation Text: This medication is Pregnancy Category B and is considered safe during pregnancy. It is unknown if this medication is excreted in breast milk. Winlevi Counseling:  I discussed with the patient the risks of topical clascoterone including but not limited to erythema, scaling, itching, and stinging. Patient voiced their understanding. Nemluvio Counseling: I discussed with the patient the risks of nemolizumab including but not limited to headache, gastrointestinal complaints, nasopharyngitis, musculoskeletal complaints, injection site reactions, and allergic reactions. The patient understands that monitoring is required and they must alert us or the primary physician if any side effects are noted. Opioid Pregnancy And Lactation Text: These medications can lead to premature delivery and should be avoided during pregnancy. These medications are also present in breast milk in small amounts. Azathioprine Counseling:  I discussed with the patient the risks of azathioprine including but not limited to myelosuppression, immunosuppression, hepatotoxicity, lymphoma, and infections.  The patient understands that monitoring is required including baseline LFTs, Creatinine, possible TPMP genotyping and weekly CBCs for the first month and then every 2 weeks thereafter.  The patient verbalized understanding of the proper use and possible adverse effects of azathioprine.  All of the patient's questions and concerns were addressed. Topical Clindamycin Counseling: Patient counseled that this medication may cause skin irritation or allergic reactions.  In the event of skin irritation, the patient was advised to reduce the amount of the drug applied or use it less frequently.   The patient verbalized understanding of the proper use and possible adverse effects of clindamycin.  All of the patient's questions and concerns were addressed. Bimzelx Pregnancy And Lactation Text: This medication crosses the placenta and the safety is uncertain during pregnancy. It is unknown if this medication is present in breast milk. Detail Level: Zone Aklief Pregnancy And Lactation Text: It is unknown if this medication is safe to use during pregnancy.  It is unknown if this medication is excreted in breast milk.  Breastfeeding women should use the topical cream on the smallest area of the skin for the shortest time needed while breastfeeding.  Do not apply to nipple and areola. Rituxan Pregnancy And Lactation Text: This medication is Pregnancy Category C and it isn't know if it is safe during pregnancy. It is unknown if this medication is excreted in breast milk but similar antibodies are known to be excreted. Hydroxyzine Pregnancy And Lactation Text: This medication is not safe during pregnancy and should not be taken. It is also excreted in breast milk and breast feeding isn't recommended. Prednisone Counseling:  I discussed with the patient the risks of prolonged use of prednisone including but not limited to weight gain, insomnia, osteoporosis, mood changes, diabetes, susceptibility to infection, glaucoma and high blood pressure.  In cases where prednisone use is prolonged, patients should be monitored with blood pressure checks, serum glucose levels and an eye exam.  Additionally, the patient may need to be placed on GI prophylaxis, PCP prophylaxis, and calcium and vitamin D supplementation and/or a bisphosphonate.  The patient verbalized understanding of the proper use and the possible adverse effects of prednisone.  All of the patient's questions and concerns were addressed. Rinvoq Pregnancy And Lactation Text: Based on animal studies, Rinvoq may cause embryo-fetal harm when administered to pregnant women.  The medication should not be used in pregnancy.  Breastfeeding is not recommended during treatment and for 6 days after the last dose. Valtrex Pregnancy And Lactation Text: this medication is Pregnancy Category B and is considered safe during pregnancy. This medication is not directly found in breast milk but it's metabolite acyclovir is present. Ketoconazole Pregnancy And Lactation Text: This medication is Pregnancy Category C and it isn't know if it is safe during pregnancy. It is also excreted in breast milk and breast feeding isn't recommended. Prednisone Pregnancy And Lactation Text: This medication is Pregnancy Category C and it isn't know if it is safe during pregnancy. This medication is excreted in breast milk. Solaraze Counseling:  I discussed with the patient the risks of Solaraze including but not limited to erythema, scaling, itching, weeping, crusting, and pain. Cantharidin Pregnancy And Lactation Text: This medication has not been proven safe during pregnancy. It is unknown if this medication is excreted in breast milk. Tetracycline Counseling: Patient counseled regarding possible photosensitivity and increased risk for sunburn.  Patient instructed to avoid sunlight, if possible.  When exposed to sunlight, patients should wear protective clothing, sunglasses, and sunscreen.  The patient was instructed to call the office immediately if the following severe adverse effects occur:  hearing changes, easy bruising/bleeding, severe headache, or vision changes.  The patient verbalized understanding of the proper use and possible adverse effects of tetracycline.  All of the patient's questions and concerns were addressed. Patient understands to avoid pregnancy while on therapy due to potential birth defects. Nsaids Counseling: NSAID Counseling: I discussed with the patient that NSAIDs should be taken with food. Prolonged use of NSAIDs can result in the development of stomach ulcers.  Patient advised to stop taking NSAIDs if abdominal pain occurs.  The patient verbalized understanding of the proper use and possible adverse effects of NSAIDs.  All of the patient's questions and concerns were addressed. Azelaic Acid Counseling: Patient counseled that medicine may cause skin irritation and to avoid applying near the eyes.  In the event of skin irritation, the patient was advised to reduce the amount of the drug applied or use it less frequently.   The patient verbalized understanding of the proper use and possible adverse effects of azelaic acid.  All of the patient's questions and concerns were addressed. Siliq Counseling:  I discussed with the patient the risks of Siliq including but not limited to new or worsening depression, suicidal thoughts and behavior, immunosuppression, malignancy, posterior leukoencephalopathy syndrome, and serious infections.  The patient understands that monitoring is required including a PPD at baseline and must alert us or the primary physician if symptoms of infection or other concerning signs are noted. There is also a special program designed to monitor depression which is required with Siliq. Include Pregnancy/Lactation Warning?: No Imiquimod Counseling:  I discussed with the patient the risks of imiquimod including but not limited to erythema, scaling, itching, weeping, crusting, and pain.  Patient understands that the inflammatory response to imiquimod is variable from person to person and was educated regarded proper titration schedule.  If flu-like symptoms develop, patient knows to discontinue the medication and contact us. Sotyktu Counseling:  I discussed the most common side effects of Sotyktu including: common cold, sore throat, sinus infections, cold sores, canker sores, folliculitis, and acne.  I also discussed more serious side effects of Sotyktu including but not limited to: serious allergic reactions; increased risk for infections such as TB; cancers such as lymphomas; rhabdomyolysis and elevated CPK; and elevated triglycerides and liver enzymes.  Arava Counseling:  Patient counseled regarding adverse effects of Arava including but not limited to nausea, vomiting, abnormalities in liver function tests. Patients may develop mouth sores, rash, diarrhea, and abnormalities in blood counts. The patient understands that monitoring is required including LFTs and blood counts.  There is a rare possibility of scarring of the liver and lung problems that can occur when taking methotrexate. Persistent nausea, loss of appetite, pale stools, dark urine, cough, and shortness of breath should be reported immediately. Patient advised to discontinue Arava treatment and consult with a physician prior to attempting conception. The patient will have to undergo a treatment to eliminate Arava from the body prior to conception. Humira Counseling:  I discussed with the patient the risks of adalimumab including but not limited to myelosuppression, immunosuppression, autoimmune hepatitis, demyelinating diseases, lymphoma, and serious infections.  The patient understands that monitoring is required including a PPD at baseline and must alert us or the primary physician if symptoms of infection or other concerning signs are noted. Sotyktu Pregnancy And Lactation Text: There is insufficient data to evaluate whether or not Sotyktu is safe to use during pregnancy.   It is not known if Sotyktu passes into breast milk and whether or not it is safe to use when breastfeeding.   Solaraze Pregnancy And Lactation Text: This medication is Pregnancy Category B and is considered safe. There is some data to suggest avoiding during the third trimester. It is unknown if this medication is excreted in breast milk. Nsaids Pregnancy And Lactation Text: These medications are considered safe up to 30 weeks gestation. It is excreted in breast milk. Terbinafine Counseling: Patient counseling regarding adverse effects of terbinafine including but not limited to headache, diarrhea, rash, upset stomach, liver function test abnormalities, itching, taste/smell disturbance, nausea, abdominal pain, and flatulence.  There is a rare possibility of liver failure that can occur when taking terbinafine.  The patient understands that a baseline LFT and kidney function test may be required. The patient verbalized understanding of the proper use and possible adverse effects of terbinafine.  All of the patient's questions and concerns were addressed. Tetracycline Pregnancy And Lactation Text: This medication is Pregnancy Category D and not consider safe during pregnancy. It is also excreted in breast milk. Olanzapine Counseling- I discussed with the patient the common side effects of olanzapine including but are not limited to: lack of energy, dry mouth, increased appetite, sleepiness, tremor, constipation, dizziness, changes in behavior, or restlessness.  Explained that teenagers are more likely to experience headaches, abdominal pain, pain in the arms or legs, tiredness, and sleepiness.  Serious side effects include but are not limited: increased risk of death in elderly patients who are confused, have memory loss, or dementia-related psychosis; hyperglycemia; increased cholesterol and triglycerides; and weight gain. Terbinafine Pregnancy And Lactation Text: This medication is Pregnancy Category B and is considered safe during pregnancy. It is also excreted in breast milk and breast feeding isn't recommended. Azelaic Acid Pregnancy And Lactation Text: This medication is considered safe during pregnancy and breast feeding. Arava Pregnancy And Lactation Text: This medication is Pregnancy Category X and is absolutely contraindicated during pregnancy. It is unknown if it is excreted in breast milk. Hyrimoz Counseling:  I discussed with the patient the risks of adalimumab including but not limited to myelosuppression, immunosuppression, autoimmune hepatitis, demyelinating diseases, lymphoma, and serious infections.  The patient understands that monitoring is required including a PPD at baseline and must alert us or the primary physician if symptoms of infection or other concerning signs are noted. Simlandi Counseling:  I discussed with the patient the risks of adalimumab including but not limited to myelosuppression, immunosuppression, autoimmune hepatitis, demyelinating diseases, lymphoma, and serious infections.  The patient understands that monitoring is required including a PPD at baseline and must alert us or the primary physician if symptoms of infection or other concerning signs are noted. Benzoyl Peroxide Counseling: Patient counseled that medicine may cause skin irritation and bleach clothing.  In the event of skin irritation, the patient was advised to reduce the amount of the drug applied or use it less frequently.   The patient verbalized understanding of the proper use and possible adverse effects of benzoyl peroxide.  All of the patient's questions and concerns were addressed. Soolantra Counseling: I discussed with the patients the risks of topial Soolantra. This is a medicine which decreases the number of mites and inflammation in the skin. You experience burning, stinging, eye irritation or allergic reactions.  Please call our office if you develop any problems from using this medication. Xeljanz Counseling: I discussed with the patient the risks of Xeljanz therapy including increased risk of infection, liver issues, headache, diarrhea, or cold symptoms. Live vaccines should be avoided. They were instructed to call if they have any problems. Klisyri Counseling:  I discussed with the patient the risks of Klisyri including but not limited to erythema, scaling, itching, weeping, crusting, and pain. Soolantra Pregnancy And Lactation Text: This medication is Pregnancy Category C. This medication is considered safe during breast feeding. Clofazimine Counseling:  I discussed with the patient the risks of clofazimine including but not limited to skin and eye pigmentation, liver damage, nausea/vomiting, gastrointestinal bleeding and allergy. Olanzapine Pregnancy And Lactation Text: This medication is pregnancy category C.   There are no adequate and well controlled trials with olanzapine in pregnant females.  Olanzapine should be used during pregnancy only if the potential benefit justifies the potential risk to the fetus.   In a study in lactating healthy women, olanzapine was excreted in breast milk.  It is recommended that women taking olanzapine should not breast feed. Benzoyl Peroxide Pregnancy And Lactation Text: This medication is Pregnancy Category C. It is unknown if benzoyl peroxide is excreted in breast milk. Azithromycin Counseling:  I discussed with the patient the risks of azithromycin including but not limited to GI upset, allergic reaction, drug rash, diarrhea, and yeast infections. Clindamycin Pregnancy And Lactation Text: This medication can be used in pregnancy if certain situations. Clindamycin is also present in breast milk. Oral Minoxidil Counseling- I discussed with the patient the risks of oral minoxidil including but not limited to shortness of breath, swelling of the feet or ankles, dizziness, lightheadedness, unwanted hair growth and allergic reaction.  The patient verbalized understanding of the proper use and possible adverse effects of oral minoxidil.  All of the patient's questions and concerns were addressed. Xelsteffenz Pregnancy And Lactation Text: This medication is Pregnancy Category D and is not considered safe during pregnancy.  The risk during breast feeding is also uncertain. Hydroxychloroquine Pregnancy And Lactation Text: This medication has been shown to cause fetal harm but it isn't assigned a Pregnancy Risk Category. There are small amounts excreted in breast milk. Elidel Counseling: Patient may experience a mild burning sensation during topical application. Elidel is not approved in children less than 2 years of age. There have been case reports of hematologic and skin malignancies in patients using topical calcineurin inhibitors although causality is questionable. Quinolones Pregnancy And Lactation Text: This medication is Pregnancy Category C and it isn't know if it is safe during pregnancy. It is also excreted in breast milk. Thalidomide Counseling: I discussed with the patient the risks of thalidomide including but not limited to birth defects, anxiety, weakness, chest pain, dizziness, cough and severe allergy. Isotretinoin Pregnancy And Lactation Text: This medication is Pregnancy Category X and is considered extremely dangerous during pregnancy. It is unknown if it is excreted in breast milk. Zoryve Pregnancy And Lactation Text: It is unknown if this medication can cause problems during pregnancy and breastfeeding. Dupixent Pregnancy And Lactation Text: This medication likely crosses the placenta but the risk for the fetus is uncertain. This medication is excreted in breast milk. High Dose Vitamin A Counseling: Side effects reviewed, pt to contact office should one occur. Protopic Pregnancy And Lactation Text: This medication is Pregnancy Category C. It is unknown if this medication is excreted in breast milk when applied topically. Odomzo Counseling- I discussed with the patient the risks of Odomzo including but not limited to nausea, vomiting, diarrhea, constipation, weight loss, changes in the sense of taste, decreased appetite, muscle spasms, and hair loss.  The patient verbalized understanding of the proper use and possible adverse effects of Odomzo.  All of the patient's questions and concerns were addressed. Birth Control Pills Pregnancy And Lactation Text: This medication should be avoided if pregnant and for the first 30 days post-partum. Cyclosporine Counseling:  I discussed with the patient the risks of cyclosporine including but not limited to hypertension, gingival hyperplasia,myelosuppression, immunosuppression, liver damage, kidney damage, neurotoxicity, lymphoma, and serious infections. The patient understands that monitoring is required including baseline blood pressure, CBC, CMP, lipid panel and uric acid, and then 1-2 times monthly CMP and blood pressure. Topical Steroids Counseling: I discussed with the patient that prolonged use of topical steroids can result in the increased appearance of superficial blood vessels (telangiectasias), lightening (hypopigmentation) and thinning of the skin (atrophy).  Patient understands to avoid using high potency steroids in skin folds, the groin or the face.  The patient verbalized understanding of the proper use and possible adverse effects of topical steroids.  All of the patient's questions and concerns were addressed. Griseofulvin Counseling:  I discussed with the patient the risks of griseofulvin including but not limited to photosensitivity, cytopenia, liver damage, nausea/vomiting and severe allergy.  The patient understands that this medication is best absorbed when taken with a fatty meal (e.g., ice cream or french fries). Litfulo Pregnancy And Lactation Text: Based on animal studies, Lifulo may cause embryo-fetal harm when administered to pregnant women.  The medication should not be used in pregnancy.  Breastfeeding is not recommended during treatment. Tremfya Counseling: I discussed with the patient the risks of guselkumab including but not limited to immunosuppression, serious infections, and drug reactions.  The patient understands that monitoring is required including a PPD at baseline and must alert us or the primary physician if symptoms of infection or other concerning signs are noted. Spironolactone Counseling: Patient advised regarding risks of diarrhea, abdominal pain, hyperkalemia, birth defects (for female patients), liver toxicity and renal toxicity. The patient may need blood work to monitor liver and kidney function and potassium levels while on therapy. The patient verbalized understanding of the proper use and possible adverse effects of spironolactone.  All of the patient's questions and concerns were addressed. Griseofulvin Pregnancy And Lactation Text: This medication is Pregnancy Category X and is known to cause serious birth defects. It is unknown if this medication is excreted in breast milk but breast feeding should be avoided. Qbrexza Counseling:  I discussed with the patient the risks of Qbrexza including but not limited to headache, mydriasis, blurred vision, dry eyes, nasal dryness, dry mouth, dry throat, dry skin, urinary hesitation, and constipation.  Local skin reactions including erythema, burning, stinging, and itching can also occur. Zyclara Counseling:  I discussed with the patient the risks of imiquimod including but not limited to erythema, scaling, itching, weeping, crusting, and pain.  Patient understands that the inflammatory response to imiquimod is variable from person to person and was educated regarded proper titration schedule.  If flu-like symptoms develop, patient knows to discontinue the medication and contact us. Low Dose Naltrexone Counseling- I discussed with the patient the potential risks and side effects of low dose naltrexone including but not limited to: more vivid dreams, headaches, nausea, vomiting, abdominal pain, fatigue, dizziness, and anxiety. Rifampin Counseling: I discussed with the patient the risks of rifampin including but not limited to liver damage, kidney damage, red-orange body fluids, nausea/vomiting and severe allergy. High Dose Vitamin A Pregnancy And Lactation Text: High dose vitamin A therapy is contraindicated during pregnancy and breast feeding. Acitretin Counseling:  I discussed with the patient the risks of acitretin including but not limited to hair loss, dry lips/skin/eyes, liver damage, hyperlipidemia, depression/suicidal ideation, photosensitivity.  Serious rare side effects can include but are not limited to pancreatitis, pseudotumor cerebri, bony changes, clot formation/stroke/heart attack.  Patient understands that alcohol is contraindicated since it can result in liver toxicity and significantly prolong the elimination of the drug by many years. Low Dose Naltrexone Pregnancy And Lactation Text: Naltrexone is pregnancy category C.  There have been no adequate and well-controlled studies in pregnant women.  It should be used in pregnancy only if the potential benefit justifies the potential risk to the fetus.   Limited data indicates that naltrexone is minimally excreted into breastmilk. Topical Steroids Applications Pregnancy And Lactation Text: Most topical steroids are considered safe to use during pregnancy and lactation.  Any topical steroid applied to the breast or nipple should be washed off before breastfeeding. Doxepin Counseling:  Patient advised that the medication is sedating and not to drive a car after taking this medication. Patient informed of potential adverse effects including but not limited to dry mouth, urinary retention, and blurry vision.  The patient verbalized understanding of the proper use and possible adverse effects of doxepin.  All of the patient's questions and concerns were addressed. Ebglyss Counseling: I discussed with the patient the risks of lebrikizumab including but not limited to eye inflammation and irritation, cold sores, injection site reactions, allergic reactions and increased risk of parasitic infection. The patient understands that monitoring is required and they must alert us or the primary physician if symptoms of infection or other concerning signs are noted. Olumiant Counseling: I discussed with the patient the risks of Olumiant therapy including but not limited to upper respiratory tract infections, shingles, cold sores, and nausea. Live vaccines should be avoided.  This medication has been linked to serious infections; higher rate of mortality; malignancy and lymphoproliferative disorders; major adverse cardiovascular events; thrombosis; gastrointestinal perforations; neutropenia; lymphopenia; anemia; liver enzyme elevations; and lipid elevations. Methotrexate Counseling:  Patient counseled regarding adverse effects of methotrexate including but not limited to nausea, vomiting, abnormalities in liver function tests. Patients may develop mouth sores, rash, diarrhea, and abnormalities in blood counts. The patient understands that monitoring is required including LFT's and blood counts.  There is a rare possibility of scarring of the liver and lung problems that can occur when taking methotrexate. Persistent nausea, loss of appetite, pale stools, dark urine, cough, and shortness of breath should be reported immediately. Patient advised to discontinue methotrexate treatment at least three months before attempting to become pregnant.  I discussed the need for folate supplements while taking methotrexate.  These supplements can decrease side effects during methotrexate treatment. The patient verbalized understanding of the proper use and possible adverse effects of methotrexate.  All of the patient's questions and concerns were addressed. Doxepin Pregnancy And Lactation Text: This medication is Pregnancy Category C and it isn't known if it is safe during pregnancy. It is also excreted in breast milk and breast feeding isn't recommended. Itraconazole Counseling:  I discussed with the patient the risks of itraconazole including but not limited to liver damage, nausea/vomiting, neuropathy, and severe allergy.  The patient understands that this medication is best absorbed when taken with acidic beverages such as non-diet cola or ginger ale.  The patient understands that monitoring is required including baseline LFTs and repeat LFTs at intervals.  The patient understands that they are to contact us or the primary physician if concerning signs are noted. Tranexamic Acid Counseling:  Patient advised of the small risk of bleeding problems with tranexamic acid. They were also instructed to call if they developed any nausea, vomiting or diarrhea. All of the patient's questions and concerns were addressed. Ebglyss Pregnancy And Lactation Text: This medication likely crosses the placenta but the risk for the fetus is uncertain. It is unknown if this medication is excreted in breast milk. Eucrisa Counseling: Patient may experience a mild burning sensation during topical application. Eucrisa is not approved in children less than 3 months of age. Qbrexza Pregnancy And Lactation Text: There is no available data on Qbrexza use in pregnant women.  There is no available data on Qbrexza use in lactation. Rifampin Pregnancy And Lactation Text: This medication is Pregnancy Category C and it isn't know if it is safe during pregnancy. It is also excreted in breast milk and should not be used if you are breast feeding. Spironolactone Pregnancy And Lactation Text: This medication can cause feminization of the male fetus and should be avoided during pregnancy. The active metabolite is also found in breast milk. Acitretin Pregnancy And Lactation Text: This medication is Pregnancy Category X and should not be given to women who are pregnant or may become pregnant in the future. This medication is excreted in breast milk. Niacinamide Counseling: I recommended taking niacin or niacinamide, also know as vitamin B3, twice daily. Recent evidence suggests that taking vitamin B3 (500 mg twice daily) can reduce the risk of actinic keratoses and non-melanoma skin cancers. Side effects of vitamin B3 include flushing and headache. Xolair Counseling:  Patient informed of potential adverse effects including but not limited to fever, muscle aches, rash and allergic reactions.  The patient verbalized understanding of the proper use and possible adverse effects of Xolair.  All of the patient's questions and concerns were addressed. Olumiant Pregnancy And Lactation Text: Based on animal studies, Olumiant may cause embryo-fetal harm when administered to pregnant women.  The medication should not be used in pregnancy.  Breastfeeding is not recommended during treatment. Topical Sulfur Applications Counseling: Topical Sulfur Counseling: Patient counseled that this medication may cause skin irritation or allergic reactions.  In the event of skin irritation, the patient was advised to reduce the amount of the drug applied or use it less frequently.   The patient verbalized understanding of the proper use and possible adverse effects of topical sulfur application.  All of the patient's questions and concerns were addressed. Enbrel Counseling:  I discussed with the patient the risks of etanercept including but not limited to myelosuppression, immunosuppression, autoimmune hepatitis, demyelinating diseases, lymphoma, and infections.  The patient understands that monitoring is required including a PPD at baseline and must alert us or the primary physician if symptoms of infection or other concerning signs are noted. Methotrexate Pregnancy And Lactation Text: This medication is Pregnancy Category X and is known to cause fetal harm. This medication is excreted in breast milk. Rinvoq Counseling: I discussed with the patient the risks of Rinvoq therapy including but not limited to upper respiratory tract infections, shingles, cold sores, bronchitis, nausea, cough, fever, acne, and headache. Live vaccines should be avoided.  This medication has been linked to serious infections; higher rate of mortality; malignancy and lymphoproliferative disorders; major adverse cardiovascular events; thrombosis; thrombocytopenia, anemia, and neutropenia; lipid elevations; liver enzyme elevations; and gastrointestinal perforations. Topical Sulfur Applications Pregnancy And Lactation Text: This medication is considered safe during pregnancy and breast feeding secondary to limited systemic absorption. Sarecycline Counseling: Patient advised regarding possible photosensitivity and discoloration of the teeth, skin, lips, tongue and gums.  Patient instructed to avoid sunlight, if possible.  When exposed to sunlight, patients should wear protective clothing, sunglasses, and sunscreen.  The patient was instructed to call the office immediately if the following severe adverse effects occur:  hearing changes, easy bruising/bleeding, severe headache, or vision changes.  The patient verbalized understanding of the proper use and possible adverse effects of sarecycline.  All of the patient's questions and concerns were addressed. Rhofade Counseling: Rhofade is a topical medication which can decrease superficial blood flow where applied. Side effects are uncommon and include stinging, redness and allergic reactions. Tranexamic Acid Pregnancy And Lactation Text: It is unknown if this medication is safe during pregnancy or breast feeding. Hydroxyzine Counseling: Patient advised that the medication is sedating and not to drive a car after taking this medication.  Patient informed of potential adverse effects including but not limited to dry mouth, urinary retention, and blurry vision.  The patient verbalized understanding of the proper use and possible adverse effects of hydroxyzine.  All of the patient's questions and concerns were addressed. Xolair Pregnancy And Lactation Text: This medication is Pregnancy Category B and is considered safe during pregnancy. This medication is excreted in breast milk. Hydroquinone Counseling:  Patient advised that medication may result in skin irritation, lightening (hypopigmentation), dryness, and burning.  In the event of skin irritation, the patient was advised to reduce the amount of the drug applied or use it less frequently.  Rarely, spots that are treated with hydroquinone can become darker (pseudoochronosis).  Should this occur, patient instructed to stop medication and call the office. The patient verbalized understanding of the proper use and possible adverse effects of hydroquinone.  All of the patient's questions and concerns were addressed. Niacinamide Pregnancy And Lactation Text: These medications are considered safe during pregnancy. Ketoconazole Counseling:   Patient counseled regarding improving absorption with orange juice.  Adverse effects include but are not limited to breast enlargement, headache, diarrhea, nausea, upset stomach, liver function test abnormalities, taste disturbance, and stomach pain.  There is a rare possibility of liver failure that can occur when taking ketoconazole. The patient understands that monitoring of LFTs may be required, especially at baseline. The patient verbalized understanding of the proper use and possible adverse effects of ketoconazole.  All of the patient's questions and concerns were addressed. Valtrex Counseling: I discussed with the patient the risks of valacyclovir including but not limited to kidney damage, nausea, vomiting and severe allergy.  The patient understands that if the infection seems to be worsening or is not improving, they are to call. Aklief counseling:  Patient advised to apply a pea-sized amount only at bedtime and wait 30 minutes after washing their face before applying.  If too drying, patient may add a non-comedogenic moisturizer.  The most commonly reported side effects including irritation, redness, scaling, dryness, stinging, burning, itching, and increased risk of sunburn.  The patient verbalized understanding of the proper use and possible adverse effects of retinoids.  All of the patient's questions and concerns were addressed. Opzelura Counseling:  I discussed with the patient the risks of Opzelura including but not limited to nasopharngitis, bronchitis, ear infection, eosinophila, hives, diarrhea, folliculitis, tonsillitis, and rhinorrhea.  Taken orally, this medication has been linked to serious infections; higher rate of mortality; malignancy and lymphoproliferative disorders; major adverse cardiovascular events; thrombosis; thrombocytopenia, anemia, and neutropenia; and lipid elevations. Oxybutynin Pregnancy And Lactation Text: This medication is Pregnancy Category B and is considered safe during pregnancy. It is unknown if it is excreted in breast milk. Cimzia Counseling:  I discussed with the patient the risks of Cimzia including but not limited to immunosuppression, allergic reactions and infections.  The patient understands that monitoring is required including a PPD at baseline and must alert us or the primary physician if symptoms of infection or other concerning signs are noted. Azathioprine Pregnancy And Lactation Text: This medication is Pregnancy Category D and isn't considered safe during pregnancy. It is unknown if this medication is excreted in breast milk. Gabapentin Counseling: I discussed with the patient the risks of gabapentin including but not limited to dizziness, somnolence, fatigue and ataxia. Spevigo Counseling: I discussed with the patient the risks of Spevigo including but not limited to fatigue, nasuea, vomiting, headache, pruritus, urinary tract infection, an infusion related reactions.  The patient understands that monitoring is required including screening for tuberculosis at baseline and yearly screening thereafter while continuing Spevigo therapy. They should contact us if symptoms of infection or other concerning signs are noted. Cephalexin Pregnancy And Lactation Text: This medication is Pregnancy Category B and considered safe during pregnancy.  It is also excreted in breast milk but can be used safely for shorter doses. Cantharidin Counseling:  I discussed with the patient the risks of Cantharidin including but not limited to pain, redness, burning, itching, and blistering. Metronidazole Counseling:  I discussed with the patient the risks of metronidazole including but not limited to seizures, nausea/vomiting, a metallic taste in the mouth, nausea/vomiting and severe allergy. Dutasteride Pregnancy And Lactation Text: This medication is absolutely contraindicated in women, especially during pregnancy and breast feeding. Feminization of male fetuses is possible if taking while pregnant. Nemluvio Pregnancy And Lactation Text: It is not known if Nemluvio causes fetal harm or is present in breast milk. Please proceed with caution if patients who are pregnant or breastfeeding. 5-Fu Counseling: 5-Fluorouracil Counseling:  I discussed with the patient the risks of 5-fluorouracil including but not limited to erythema, scaling, itching, weeping, crusting, and pain. Spevigo Pregnancy And Lactation Text: The risk during pregnancy and breastfeeding is uncertain with this medication. This medication does cross the placenta. It is unknown if this medication is found in breast milk. Winlevi Pregnancy And Lactation Text: This medication is considered safe during pregnancy and breastfeeding. Finasteride Male Counseling: Finasteride Counseling:  I discussed with the patient the risks of use of finasteride including but not limited to decreased libido, decreased ejaculate volume, gynecomastia, and depression. Women should not handle medication.  All of the patient's questions and concerns were addressed. Albendazole Counseling:  I discussed with the patient the risks of albendazole including but not limited to cytopenia, kidney damage, nausea/vomiting and severe allergy.  The patient understands that this medication is being used in an off-label manner. Opzelura Pregnancy And Lactation Text: There is insufficient data to evaluate drug-associated risk for major birth defects, miscarriage, or other adverse maternal or fetal outcomes.  There is a pregnancy registry that monitors pregnancy outcomes in pregnant persons exposed to the medication during pregnancy.  It is unknown if this medication is excreted in breast milk.  Do not breastfeed during treatment and for about 4 weeks after the last dose. Cellcept Counseling:  I discussed with the patient the risks of mycophenolate mofetil including but not limited to infection/immunosuppression, GI upset, hypokalemia, hypercholesterolemia, bone marrow suppression, lymphoproliferative disorders, malignancy, GI ulceration/bleed/perforation, colitis, interstitial lung disease, kidney failure, progressive multifocal leukoencephalopathy, and birth defects.  The patient understands that monitoring is required including a baseline creatinine and regular CBC testing. In addition, patient must alert us immediately if symptoms of infection or other concerning signs are noted. Topical Ketoconazole Counseling: Patient counseled that this medication may cause skin irritation or allergic reactions.  In the event of skin irritation, the patient was advised to reduce the amount of the drug applied or use it less frequently.   The patient verbalized understanding of the proper use and possible adverse effects of ketoconazole.  All of the patient's questions and concerns were addressed. Erivedge Counseling- I discussed with the patient the risks of Erivedge including but not limited to nausea, vomiting, diarrhea, constipation, weight loss, changes in the sense of taste, decreased appetite, muscle spasms, and hair loss.  The patient verbalized understanding of the proper use and possible adverse effects of Erivedge.  All of the patient's questions and concerns were addressed. Clindamycin Counseling: I counseled the patient regarding use of clindamycin as an antibiotic for prophylactic and/or therapeutic purposes. Clindamycin is active against numerous classes of bacteria, including skin bacteria. Side effects may include nausea, diarrhea, gastrointestinal upset, rash, hives, yeast infections, and in rare cases, colitis. Metronidazole Pregnancy And Lactation Text: This medication is Pregnancy Category B and considered safe during pregnancy.  It is also excreted in breast milk. Propranolol Counseling:  I discussed with the patient the risks of propranolol including but not limited to low heart rate, low blood pressure, low blood sugar, restlessness and increased cold sensitivity. They should call the office if they experience any of these side effects. Stelara Counseling:  I discussed with the patient the risks of ustekinumab including but not limited to immunosuppression, malignancy, posterior leukoencephalopathy syndrome, and serious infections.  The patient understands that monitoring is required including a PPD at baseline and must alert us or the primary physician if symptoms of infection or other concerning signs are noted. Glycopyrrolate Counseling:  I discussed with the patient the risks of glycopyrrolate including but not limited to skin rash, drowsiness, dry mouth, difficulty urinating, and blurred vision. Minocycline Counseling: Patient advised regarding possible photosensitivity and discoloration of the teeth, skin, lips, tongue and gums.  Patient instructed to avoid sunlight, if possible.  When exposed to sunlight, patients should wear protective clothing, sunglasses, and sunscreen.  The patient was instructed to call the office immediately if the following severe adverse effects occur:  hearing changes, easy bruising/bleeding, severe headache, or vision changes.  The patient verbalized understanding of the proper use and possible adverse effects of minocycline.  All of the patient's questions and concerns were addressed. Propranolol Pregnancy And Lactation Text: This medication is Pregnancy Category C and it isn't known if it is safe during pregnancy. It is excreted in breast milk. VTAMA Counseling: I discussed with the patient that VTAMA is not for use in the eyes, mouth or mouth. They should call the office if they develop any signs of allergic reactions to VTAMA. The patient verbalized understanding of the proper use and possible adverse effects of VTAMA.  All of the patient's questions and concerns were addressed. Cimzia Pregnancy And Lactation Text: This medication crosses the placenta but can be considered safe in certain situations. Cimzia may be excreted in breast milk. Bexarotene Counseling:  I discussed with the patient the risks of bexarotene including but not limited to hair loss, dry lips/skin/eyes, liver abnormalities, hyperlipidemia, pancreatitis, depression/suicidal ideation, photosensitivity, drug rash/allergic reactions, hypothyroidism, anemia, leukopenia, infection, cataracts, and teratogenicity.  Patient understands that they will need regular blood tests to check lipid profile, liver function tests, white blood cell count, thyroid function tests and pregnancy test if applicable. Rituxan Counseling:  I discussed with the patient the risks of Rituxan infusions. Side effects can include infusion reactions, severe drug rashes including mucocutaneous reactions, reactivation of latent hepatitis and other infections and rarely progressive multifocal leukoencephalopathy.  All of the patient's questions and concerns were addressed. Cosentyx Counseling:  I discussed with the patient the risks of Cosentyx including but not limited to worsening of Crohn's disease, immunosuppression, allergic reactions and infections.  The patient understands that monitoring is required including a PPD at baseline and must alert us or the primary physician if symptoms of infection or other concerning signs are noted. Albendazole Pregnancy And Lactation Text: This medication is Pregnancy Category C and it isn't known if it is safe during pregnancy. It is also excreted in breast milk. Bexarotene Pregnancy And Lactation Text: This medication is Pregnancy Category X and should not be given to women who are pregnant or may become pregnant. This medication should not be used if you are breast feeding. Picato Counseling:  I discussed with the patient the risks of Picato including but not limited to erythema, scaling, itching, weeping, crusting, and pain. Finasteride Female Counseling: Finasteride Counseling:  I discussed with the patient the risks of use of finasteride including but not limited to decreased libido and sexual dysfunction. Explained the teratogenic nature of the medication and stressed the importance of not getting pregnant during treatment. All of the patient's questions and concerns were addressed. Cibinqo Counseling: I discussed with the patient the risks of Cibinqo therapy including but not limited to common cold, nausea, headache, cold sores, increased blood CPK levels, dizziness, UTIs, fatigue, acne, and vomitting. Live vaccines should be avoided.  This medication has been linked to serious infections; higher rate of mortality; malignancy and lymphoproliferative disorders; major adverse cardiovascular events; thrombosis; thrombocytopenia and lymphopenia; lipid elevations; and retinal detachment. SSKI Counseling:  I discussed with the patient the risks of SSKI including but not limited to thyroid abnormalities, metallic taste, GI upset, fever, headache, acne, arthralgias, paraesthesias, lymphadenopathy, easy bleeding, arrhythmias, and allergic reaction. Fluconazole Counseling:  Patient counseled regarding adverse effects of fluconazole including but not limited to headache, diarrhea, nausea, upset stomach, liver function test abnormalities, taste disturbance, and stomach pain.  There is a rare possibility of liver failure that can occur when taking fluconazole.  The patient understands that monitoring of LFTs and kidney function test may be required, especially at baseline. The patient verbalized understanding of the proper use and possible adverse effects of fluconazole.  All of the patient's questions and concerns were addressed. Libtayo Counseling- I discussed with the patient the risks of Libtayo including but not limited to nausea, vomiting, diarrhea, and bone or muscle pain.  The patient verbalized understanding of the proper use and possible adverse effects of Libtayo.  All of the patient's questions and concerns were addressed. Finasteride Pregnancy And Lactation Text: This medication is absolutely contraindicated during pregnancy. It is unknown if it is excreted in breast milk. Cyclophosphamide Counseling:  I discussed with the patient the risks of cyclophosphamide including but not limited to hair loss, hormonal abnormalities, decreased fertility, abdominal pain, diarrhea, nausea and vomiting, bone marrow suppression and infection. The patient understands that monitoring is required while taking this medication. Drysol Counseling:  I discussed with the patient the risks of drysol/aluminum chloride including but not limited to skin rash, itching, irritation, burning. Glycopyrrolate Pregnancy And Lactation Text: This medication is Pregnancy Category B and is considered safe during pregnancy. It is unknown if it is excreted breast milk. Isotretinoin Counseling: Patient should get monthly blood tests, not donate blood, not drive at night if vision affected, not share medication, and not undergo elective surgery for 6 months after tx completed. Side effects reviewed, pt to contact office should one occur. Zoryve Counseling:  I discussed with the patient that Zoryve is not for use in the eyes, mouth or vagina. The most commonly reported side effects include diarrhea, headache, insomnia, application site pain, upper respiratory tract infections, and urinary tract infections.  All of the patient's questions and concerns were addressed. Cibinqo Pregnancy And Lactation Text: It is unknown if this medication will adversely affect pregnancy or breast feeding.  You should not take this medication if you are currently pregnant or planning a pregnancy or while breastfeeding. Ivermectin Counseling:  Patient instructed to take medication on an empty stomach with a full glass of water.  Patient informed of potential adverse effects including but not limited to nausea, diarrhea, dizziness, itching, and swelling of the extremities or lymph nodes.  The patient verbalized understanding of the proper use and possible adverse effects of ivermectin.  All of the patient's questions and concerns were addressed. Topical Metronidazole Counseling: Metronidazole is a topical antibiotic medication. You may experience burning, stinging, redness, or allergic reactions.  Please call our office if you develop any problems from using this medication. Cyclophosphamide Pregnancy And Lactation Text: This medication is Pregnancy Category D and it isn't considered safe during pregnancy. This medication is excreted in breast milk. Cimetidine Counseling:  I discussed with the patient the risks of Cimetidine including but not limited to gynecomastia, headache, diarrhea, nausea, drowsiness, arrhythmias, pancreatitis, skin rashes, psychosis, bone marrow suppression and kidney toxicity. Litfulo Counseling: I discussed with the patient the risks of Litfulo therapy including but not limited to upper respiratory tract infections, shingles, cold sores, and nausea. Live vaccines should be avoided.  This medication has been linked to serious infections; higher rate of mortality; malignancy and lymphoproliferative disorders; major adverse cardiovascular events; thrombosis; gastrointestinal perforations; neutropenia; lymphopenia; anemia; liver enzyme elevations; and lipid elevations. Protopic Counseling: Patient may experience a mild burning sensation during topical application. Protopic is not approved in children less than 2 years of age. There have been case reports of hematologic and skin malignancies in patients using topical calcineurin inhibitors although causality is questionable. Dupixent Counseling: I discussed with the patient the risks of dupilumab including but not limited to eye inflammation and irritation, cold sores, injection site reactions, allergic reactions and increased risk of parasitic infection. The patient understands that monitoring is required and they must alert us or the primary physician if symptoms of infection or other concerning signs are noted. Topical Metronidazole Pregnancy And Lactation Text: This medication is Pregnancy Category B and considered safe during pregnancy.  It is also considered safe to use while breastfeeding. Quinolones Counseling:  I discussed with the patient the risks of fluoroquinolones including but not limited to GI upset, allergic reaction, drug rash, diarrhea, dizziness, photosensitivity, yeast infections, liver function test abnormalities, tendonitis/tendon rupture. Hydroxychloroquine Counseling:  I discussed with the patient that a baseline ophthalmologic exam is needed at the start of therapy and every year thereafter while on therapy. A CBC may also be warranted for monitoring.  The side effects of this medication were discussed with the patient, including but not limited to agranulocytosis, aplastic anemia, seizures, rashes, retinopathy, and liver toxicity. Patient instructed to call the office should any adverse effect occur.  The patient verbalized understanding of the proper use and possible adverse effects of Plaquenil.  All the patient's questions and concerns were addressed. Taltz Counseling: I discussed with the patient the risks of ixekizumab including but not limited to immunosuppression, serious infections, worsening of inflammatory bowel disease and drug reactions.  The patient understands that monitoring is required including a PPD at baseline and must alert us or the primary physician if symptoms of infection or other concerning signs are noted. Libtayo Pregnancy And Lactation Text: This medication is contraindicated in pregnancy and when breast feeding. Sski Pregnancy And Lactation Text: This medication is Pregnancy Category D and isn't considered safe during pregnancy. It is excreted in breast milk. Birth Control Pills Counseling: Birth Control Pill Counseling: I discussed with the patient the potential side effects of OCPs including but not limited to increased risk of stroke, heart attack, thrombophlebitis, deep venous thrombosis, hepatic adenomas, breast changes, GI upset, headaches, and depression.  The patient verbalized understanding of the proper use and possible adverse effects of OCPs. All of the patient's questions and concerns were addressed.

## 2024-12-10 ENCOUNTER — OFFICE VISIT (OUTPATIENT)
Dept: INTERNAL MEDICINE | Age: 64
End: 2024-12-10
Payer: COMMERCIAL

## 2024-12-10 VITALS
BODY MASS INDEX: 36.64 KG/M2 | SYSTOLIC BLOOD PRESSURE: 134 MMHG | HEART RATE: 88 BPM | DIASTOLIC BLOOD PRESSURE: 84 MMHG | WEIGHT: 241 LBS

## 2024-12-10 DIAGNOSIS — E11.9 TYPE 2 DIABETES MELLITUS WITHOUT COMPLICATION, WITHOUT LONG-TERM CURRENT USE OF INSULIN (HCC): ICD-10-CM

## 2024-12-10 DIAGNOSIS — M25.841 CYST OF JOINT OF RIGHT HAND: Primary | ICD-10-CM

## 2024-12-10 DIAGNOSIS — G57.51 TARSAL TUNNEL SYNDROME, RIGHT: ICD-10-CM

## 2024-12-10 PROCEDURE — 3075F SYST BP GE 130 - 139MM HG: CPT

## 2024-12-10 PROCEDURE — 99213 OFFICE O/P EST LOW 20 MIN: CPT

## 2024-12-10 PROCEDURE — 3079F DIAST BP 80-89 MM HG: CPT

## 2024-12-10 PROCEDURE — 1123F ACP DISCUSS/DSCN MKR DOCD: CPT

## 2024-12-10 PROCEDURE — 3051F HG A1C>EQUAL 7.0%<8.0%: CPT

## 2024-12-10 RX ORDER — DULAGLUTIDE 1.5 MG/.5ML
1.5 INJECTION, SOLUTION SUBCUTANEOUS WEEKLY
Qty: 2 ML | Refills: 0 | Status: SHIPPED | OUTPATIENT
Start: 2024-12-10 | End: 2025-01-09

## 2024-12-10 NOTE — PROGRESS NOTES
Attending Physician Statement  I have discussed the care of Keila Segundo, including pertinent history and exam findings,  with the resident. I have reviewed the key elements of all parts of the encounter with the resident.  I agree with the assessment, plan and orders as documented by the resident.  (GE Modifier)   
(TRULICITY) 1.5 MG/0.5ML SC injection Please inject 1.5 mg into the skin once a week 4 mL 2    spironolactone (ALDACTONE) 50 MG tablet Take 1 tablet by mouth daily 30 tablet 0    atorvastatin (LIPITOR) 80 MG tablet Take 1 tablet by mouth once daily 90 tablet 0    glimepiride (AMARYL) 2 MG tablet TAKE 1 TABLET BY MOUTH ONCE DAILY IN THE MORNING BEFORE BREAKFAST 30 tablet 5    blood glucose monitor kit and supplies Test fasting blood sugar (nothing to eat/drink for 8 hours before) once daily 1 kit 0    Alcohol Swabs (ALCOHOL PREP) 70 % PADS Use one pad daily to clean skin before checking blood sugar; use one additional pad to cleanse skin before injecting Trulicity once weekly 100 each 3    blood glucose monitor strips Test once a day & as needed for symptoms of irregular blood glucose. Dispense sufficient amount for indicated testing frequency plus additional to accommodate PRN testing needs. 100 strip 3    Lancets MISC 1 each by Does not apply route every morning (before breakfast) Test fasting blood sugar once daily 100 each 3    fluticasone (FLONASE) 50 MCG/ACT nasal spray 1 spray by Each Nostril route daily 32 g 1    benazepril (LOTENSIN) 40 MG tablet Take 1 tablet by mouth daily 90 tablet 0    acetaminophen (TYLENOL) 500 MG tablet Take 2 tablets by mouth 3 times daily for 4 days 24 tablet 0     No current facility-administered medications for this visit.       Social History     Tobacco Use    Smoking status: Never    Smokeless tobacco: Never   Substance Use Topics    Alcohol use: No     Alcohol/week: 0.0 standard drinks of alcohol    Drug use: No       Family History   Problem Relation Age of Onset    Diabetes Mother     High Blood Pressure Mother     Heart Disease Father     Cancer Father     Breast Cancer Paternal Grandmother 80    Colon Cancer Paternal Aunt     Breast Cancer Paternal Aunt 50        REVIEW OF SYSTEMS:  Review of Systems   Constitutional:  Negative for chills, fatigue and fever.

## 2024-12-20 NOTE — PROGRESS NOTES
Patient instructed to remove shoes and socks and instructed to sit in exam chair.  Current PCP is Brandy Blanco MD and date of last visit was unknown .   Do you have a follow up visit scheduled?  Yes  If yes, the date is 12/27/2024

## 2024-12-26 DIAGNOSIS — I10 ESSENTIAL HYPERTENSION: Chronic | ICD-10-CM

## 2024-12-26 RX ORDER — SPIRONOLACTONE 50 MG/1
50 TABLET, FILM COATED ORAL DAILY
Qty: 30 TABLET | Refills: 0 | Status: SHIPPED | OUTPATIENT
Start: 2024-12-26

## 2024-12-26 NOTE — TELEPHONE ENCOUNTER
Keila Segundo is calling to request a refill on the following medication(s):    Medication Request:  Requested Prescriptions     Pending Prescriptions Disp Refills    spironolactone (ALDACTONE) 50 MG tablet 30 tablet 0     Sig: Take 1 tablet by mouth daily       Last Visit Date (If Applicable):  12/10/2024    Next Visit Date:    12/27/2024

## 2024-12-27 ENCOUNTER — OFFICE VISIT (OUTPATIENT)
Dept: INTERNAL MEDICINE | Age: 64
End: 2024-12-27
Payer: COMMERCIAL

## 2024-12-27 VITALS
HEART RATE: 85 BPM | HEIGHT: 68 IN | OXYGEN SATURATION: 96 % | DIASTOLIC BLOOD PRESSURE: 86 MMHG | WEIGHT: 245.4 LBS | SYSTOLIC BLOOD PRESSURE: 146 MMHG | BODY MASS INDEX: 37.19 KG/M2 | TEMPERATURE: 97.3 F

## 2024-12-27 DIAGNOSIS — Z85.3 H/O MALIGNANT NEOPLASM OF BREAST: Primary | ICD-10-CM

## 2024-12-27 DIAGNOSIS — I10 ESSENTIAL HYPERTENSION: ICD-10-CM

## 2024-12-27 DIAGNOSIS — G89.29 CHRONIC RIGHT HIP PAIN: ICD-10-CM

## 2024-12-27 DIAGNOSIS — E11.65 TYPE 2 DIABETES MELLITUS WITH HYPERGLYCEMIA, WITHOUT LONG-TERM CURRENT USE OF INSULIN (HCC): ICD-10-CM

## 2024-12-27 DIAGNOSIS — E78.5 DYSLIPIDEMIA: ICD-10-CM

## 2024-12-27 DIAGNOSIS — M25.551 CHRONIC RIGHT HIP PAIN: ICD-10-CM

## 2024-12-27 PROBLEM — I50.9 ACUTE CONGESTIVE HEART FAILURE (HCC): Status: RESOLVED | Noted: 2022-03-03 | Resolved: 2024-12-27

## 2024-12-27 PROCEDURE — 99211 OFF/OP EST MAY X REQ PHY/QHP: CPT | Performed by: HOSPITALIST

## 2024-12-27 NOTE — PROGRESS NOTES
Attending Physician Statement  I have discussed the care of Keila Segundo, including pertinent history and exam findings with the resident. I have reviewed the key elements of all parts of the encounter with the resident. I have seen and examined the patient with the resident and the key elements of all parts of the encounter have been performed by me.   I agree with the assessment, and status of the problem list as documented. The plan and orders should include   Orders Placed This Encounter   Procedures    RIP DIGITAL DIAGNOSTIC W OR WO CAD BILATERAL    Hemoglobin A1C    Comprehensive Metabolic Panel    Lipid Panel    and this was also documented by the resident. The medication list was reviewed with the resident and is up to date. The return visit should be in 4 weeks .    Reinier Salgado MD  Attending Physician,  Department of Internal Medicine  [facility] Internal [facility]  [facility]      12/27/2024, 11:22 AM

## 2024-12-27 NOTE — PROGRESS NOTES
[facility]    New Patient Note/History and Physical    Date of patient's visit: 12/27/2024    Name:  Keila Segundo      YOB: 1960    Patient Care Team:  Brandy Blanco MD as PCP - General (Internal Medicine)  Kristine Carlton MD as PCP - Empaneled Provider    REASON FOR VISIT: First Visit, establish care     HISTORY OF PRESENTING ILLNESS:    History was obtained from the patient. Keila Segundo is a 64 y.o. is here to establish care.   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  The patient states the pain is 4 out of 10 intensity, and relieved with rest.  Patient does state that she previously had some history of osteonecrosis though the x-ray available on the EMR from 2013 did not show any osteonecrosis.  Discussed with patient that she is due for her lab workup and flu shot, the patient agreed to get the flu shot today in the office.  The patient states she is compliant with all her medications, blood pressure is well-controlled with current regime, today in the clinic the blood pressure was 146/86, but patient states she did not take her medication this morning.  Also patient is due for her yearly mammogram, will give prescription for mammogram.      PMH  HTN amlodipine 10 mg, benazepril 40 mg, Coreg 12.5 twice daily, spironolactone 50  T2DM, on Trulicity 1.5 mg, Amaryl 2 mg, metformin 1000 mg twice daily  Hyperlipidemia on atorvastatin 80 mg  Mild intermittent asthma -on Breo ellipta  NITO?  CPAP  History of breast cancer T1 N0 M0-January 2000 status postlumpectomy chemotherapy and radiation in 2000  Chronic right hip pain-previous x-ray of right hip 2013 negative for osteonecrosis  Stress test 2023- EF 65%, negative stress test      Health maintenance  Mammogram

## 2024-12-31 DIAGNOSIS — E11.9 TYPE 2 DIABETES MELLITUS WITHOUT COMPLICATION, WITHOUT LONG-TERM CURRENT USE OF INSULIN (HCC): Chronic | ICD-10-CM

## 2024-12-31 NOTE — TELEPHONE ENCOUNTER
Keila Segundo is calling to request a refill on the following medication(s):    Medication Request:  Requested Prescriptions     Pending Prescriptions Disp Refills    metFORMIN (GLUCOPHAGE) 1000 MG tablet 180 tablet 1     Sig: Take 1 tablet by mouth 2 times daily (with meals)       Last Visit Date (If Applicable):  12/27/2024    Next Visit Date:    2/21/2025

## 2025-01-06 ENCOUNTER — OFFICE VISIT (OUTPATIENT)
Dept: PODIATRY | Age: 65
End: 2025-01-06
Payer: COMMERCIAL

## 2025-01-06 VITALS
BODY MASS INDEX: 37.13 KG/M2 | DIASTOLIC BLOOD PRESSURE: 107 MMHG | WEIGHT: 245 LBS | SYSTOLIC BLOOD PRESSURE: 173 MMHG | HEIGHT: 68 IN | HEART RATE: 81 BPM

## 2025-01-06 DIAGNOSIS — M79.672 LEFT FOOT PAIN: ICD-10-CM

## 2025-01-06 DIAGNOSIS — T45.1X5S PERIPHERAL NEUROPATHY DUE TO AND NOT CONCURRENT WITH CHEMOTHERAPY (HCC): ICD-10-CM

## 2025-01-06 DIAGNOSIS — E11.42 DIABETIC POLYNEUROPATHY ASSOCIATED WITH TYPE 2 DIABETES MELLITUS (HCC): ICD-10-CM

## 2025-01-06 DIAGNOSIS — G62.0 PERIPHERAL NEUROPATHY DUE TO AND NOT CONCURRENT WITH CHEMOTHERAPY (HCC): ICD-10-CM

## 2025-01-06 DIAGNOSIS — M79.671 RIGHT FOOT PAIN: Primary | ICD-10-CM

## 2025-01-06 DIAGNOSIS — E11.42 TYPE 2 DIABETES MELLITUS WITH DIABETIC POLYNEUROPATHY, UNSPECIFIED WHETHER LONG TERM INSULIN USE (HCC): ICD-10-CM

## 2025-01-06 PROCEDURE — 99203 OFFICE O/P NEW LOW 30 MIN: CPT

## 2025-01-06 PROCEDURE — 1123F ACP DISCUSS/DSCN MKR DOCD: CPT

## 2025-01-06 PROCEDURE — 3080F DIAST BP >= 90 MM HG: CPT

## 2025-01-06 PROCEDURE — 3077F SYST BP >= 140 MM HG: CPT

## 2025-01-06 NOTE — PROGRESS NOTES
[facility]  [address]   [address] [place] [address]  Tel: [phone]   Fax: [phone]    Subjective     CC: Diabetic foot exam and painful and elongated toe nails    HPI:  Keila Segundo is a 64 y.o. year old female who presents to clinic today for diabetic foot examination and symptoms of numbness and tingling.  The patient is a diabetic, and they're last HgbA1c was 7.4% in (4/17/2024).  The patient explains that she has been dealing with increasing numbness and tingling to her bilateral feet, right worse than the left, over the last several years.  While the patient is diabetic she also admits to undergoing chemotherapy in the past. Patient denies any rest pain or claudication symptoms. The patient denies any acute trauma but does admit to breaking bones in her feet and ankle in the past.  Admits to generalized pain to her bilateral lower extremities.  The patient denies any other pedal complaints.     Primary care physician is Brandy Blanco MD.    ROS:    Constitutional: Denies nausea, vomiting, fever, chills.  Neurologic: Admits to numbness, tingling, and burning in the feet.    Vascular: Denies symptoms of lower extremity claudication.    Skin: Denies open wounds.  Otherwise negative except as noted in the HPI.     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

## 2025-01-14 DIAGNOSIS — E78.5 DYSLIPIDEMIA: Chronic | ICD-10-CM

## 2025-01-14 DIAGNOSIS — I10 ESSENTIAL HYPERTENSION: Chronic | ICD-10-CM

## 2025-01-14 DIAGNOSIS — E11.9 TYPE 2 DIABETES MELLITUS WITHOUT COMPLICATION, WITHOUT LONG-TERM CURRENT USE OF INSULIN (HCC): ICD-10-CM

## 2025-01-14 NOTE — TELEPHONE ENCOUNTER
Request for   Requested Prescriptions     Pending Prescriptions Disp Refills    dulaglutide (TRULICITY) 1.5 MG/0.5ML SC injection 2 mL 2     Sig: Inject 0.5 mLs into the skin once a week    glimepiride (AMARYL) 2 MG tablet 90 tablet 1     Sig: TAKE 1 TABLET BY MOUTH ONCE DAILY IN THE MORNING BEFORE BREAKFAST    spironolactone (ALDACTONE) 50 MG tablet 90 tablet 1     Sig: Take 1 tablet by mouth daily    atorvastatin (LIPITOR) 80 MG tablet 90 tablet 1     Sig: Take 1 tablet by mouth daily    .      Please review and e-scribe to pharmacy listed in chart if appropriate. Thank you.      Last Visit Date: 12/27/2024  Next Visit Date: 2/21/2025    Future Appointments   Date Time Provider Department Center   1/16/2025  3:30 PM Yfn Ruth PA ORTHO SPECIA [facility]   2/21/2025  8:30 AM Brandy Blanco MD [facility] BS ECC DEP   4/14/2025  3:15 PM Juliano Ramirez DPM [facility]       Health Maintenance   Topic Date Due    Diabetic retinal exam  10/28/2016    Respiratory Syncytial Virus (RSV) Pregnant or age 60 yrs+ (1 - Risk 60-74 years 1-dose series) Never done    Shingles vaccine (2 of 2) 06/22/2024    Flu vaccine (1) 08/01/2024    COVID-19 Vaccine (3 - 2023-24 season) 09/01/2024    Lipids  09/13/2024    Breast cancer screen  10/21/2024    A1C test (Diabetic or Prediabetic)  04/17/2025    Diabetic Alb to Cr ratio (uACR) test  04/17/2025    Depression Screen  04/17/2025    GFR test (Diabetes, CKD 3-4, OR last GFR 15-59)  04/17/2025    Diabetic foot exam  12/10/2025    Colorectal Cancer Screen  05/11/2026    DTaP/Tdap/Td vaccine (2 - Td or Tdap) 04/13/2031    DEXA (modify frequency per FRAX score)  Completed    Pneumococcal 65+ years Vaccine  Completed    Hepatitis C screen  Completed    Hepatitis A vaccine  Aged Out    Hepatitis B vaccine  Aged Out    Hib vaccine  Aged Out    Polio vaccine  Aged Out    Meningococcal (ACWY) vaccine  Aged Out    Depression Monitoring  Discontinued    Pneumococcal 0-64 years Vaccine

## 2025-01-15 RX ORDER — SPIRONOLACTONE 50 MG/1
50 TABLET, FILM COATED ORAL DAILY
Qty: 90 TABLET | Refills: 1 | Status: SHIPPED | OUTPATIENT
Start: 2025-01-15

## 2025-01-15 RX ORDER — ATORVASTATIN CALCIUM 80 MG/1
80 TABLET, FILM COATED ORAL DAILY
Qty: 90 TABLET | Refills: 1 | Status: SHIPPED | OUTPATIENT
Start: 2025-01-15

## 2025-01-15 RX ORDER — DULAGLUTIDE 1.5 MG/.5ML
1.5 INJECTION, SOLUTION SUBCUTANEOUS WEEKLY
Qty: 2 ML | Refills: 2 | Status: SHIPPED | OUTPATIENT
Start: 2025-01-15

## 2025-01-15 RX ORDER — GLIMEPIRIDE 2 MG/1
TABLET ORAL
Qty: 90 TABLET | Refills: 1 | Status: SHIPPED | OUTPATIENT
Start: 2025-01-15

## 2025-01-16 ENCOUNTER — OFFICE VISIT (OUTPATIENT)
Dept: ORTHOPEDIC SURGERY | Age: 65
End: 2025-01-16

## 2025-01-16 VITALS — WEIGHT: 245 LBS | BODY MASS INDEX: 37.13 KG/M2 | HEIGHT: 68 IN

## 2025-01-16 DIAGNOSIS — S60.551A FOREIGN BODY OF RIGHT HAND, INITIAL ENCOUNTER: ICD-10-CM

## 2025-01-16 DIAGNOSIS — M65.30 TRIGGER FINGER OF RIGHT HAND, UNSPECIFIED FINGER: ICD-10-CM

## 2025-01-16 DIAGNOSIS — R52 PAIN: Primary | ICD-10-CM

## 2025-01-16 RX ORDER — LIDOCAINE HYDROCHLORIDE 10 MG/ML
0.5 INJECTION, SOLUTION INFILTRATION; PERINEURAL ONCE
Status: COMPLETED | OUTPATIENT
Start: 2025-01-16 | End: 2025-01-16

## 2025-01-16 RX ORDER — BETAMETHASONE SODIUM PHOSPHATE AND BETAMETHASONE ACETATE 3; 3 MG/ML; MG/ML
3 INJECTION, SUSPENSION INTRA-ARTICULAR; INTRALESIONAL; INTRAMUSCULAR; SOFT TISSUE ONCE
Status: COMPLETED | OUTPATIENT
Start: 2025-01-16 | End: 2025-01-16

## 2025-01-16 RX ADMIN — BETAMETHASONE SODIUM PHOSPHATE AND BETAMETHASONE ACETATE 3 MG: 3; 3 INJECTION, SUSPENSION INTRA-ARTICULAR; INTRALESIONAL; INTRAMUSCULAR; SOFT TISSUE at 16:06

## 2025-01-16 RX ADMIN — LIDOCAINE HYDROCHLORIDE 0.5 ML: 10 INJECTION, SOLUTION INFILTRATION; PERINEURAL at 16:06

## 2025-01-17 ENCOUNTER — HOSPITAL ENCOUNTER (OUTPATIENT)
Age: 65
Setting detail: SPECIMEN
Discharge: HOME | End: 2025-01-17

## 2025-01-17 ENCOUNTER — TELEPHONE (OUTPATIENT)
Dept: ORTHOPEDIC SURGERY | Age: 65
End: 2025-01-17

## 2025-01-17 DIAGNOSIS — E11.65 TYPE 2 DIABETES MELLITUS WITH HYPERGLYCEMIA, WITHOUT LONG-TERM CURRENT USE OF INSULIN (HCC): ICD-10-CM

## 2025-01-17 DIAGNOSIS — E78.5 DYSLIPIDEMIA: ICD-10-CM

## 2025-01-17 DIAGNOSIS — I10 ESSENTIAL HYPERTENSION: ICD-10-CM

## 2025-01-17 LAB
ALBUMIN SERPL-MCNC: 4.6 G/DL (ref 3.5–5.2)
ALBUMIN/GLOB SERPL: 1.4 {RATIO} (ref 1–2.5)
ALP SERPL-CCNC: 89 U/L (ref 35–104)
ALT SERPL-CCNC: 18 U/L (ref 10–35)
ANION GAP SERPL CALCULATED.3IONS-SCNC: 16 MMOL/L (ref 9–16)
AST SERPL-CCNC: 18 U/L (ref 10–35)
BILIRUB SERPL-MCNC: 0.3 MG/DL (ref 0–1.2)
BUN SERPL-MCNC: 22 MG/DL (ref 8–23)
CALCIUM SERPL-MCNC: 10.4 MG/DL (ref 8.6–10.4)
CHLORIDE SERPL-SCNC: 102 MMOL/L (ref 98–107)
CHOLEST SERPL-MCNC: 167 MG/DL (ref 0–199)
CHOLESTEROL/HDL RATIO: 3.5
CO2 SERPL-SCNC: 21 MMOL/L (ref 20–31)
CREAT SERPL-MCNC: 0.8 MG/DL (ref 0.6–0.9)
EST. AVERAGE GLUCOSE BLD GHB EST-MCNC: 151 MG/DL
GFR, ESTIMATED: 81 ML/MIN/1.73M2
GLUCOSE SERPL-MCNC: 78 MG/DL (ref 74–99)
HBA1C MFR BLD: 6.9 % (ref 4–6)
HDLC SERPL-MCNC: 48 MG/DL
LDLC SERPL CALC-MCNC: 107 MG/DL (ref 0–100)
POTASSIUM SERPL-SCNC: 4.5 MMOL/L (ref 3.7–5.3)
PROT SERPL-MCNC: 7.9 G/DL (ref 6.6–8.7)
SODIUM SERPL-SCNC: 139 MMOL/L (ref 136–145)
TRIGL SERPL-MCNC: 62 MG/DL
VLDLC SERPL CALC-MCNC: 12 MG/DL (ref 1–30)

## 2025-01-17 NOTE — PROGRESS NOTES
[facility]  [address]  [place] [facility] [address]  Dept Phone: [phone]  Dept Fax: [phone]      Orthopaedic Trauma New Patient      CHIEF COMPLAINT:    Chief Complaint   Patient presents with    New Patient     LUMP on rt wrist       HISTORY OF PRESENT ILLNESS:    The patient is a 64 y.o. female who is being seen as a new patient for evaluation of right hand pain.  Patient reports 2 different issues going on with the right hand.  She states approximately 3 months ago she noticed a \"bump\" over the thenar eminence.  She states this is very firm to the touch and tender to palpation.  This bump underlies a scar from a previous injury that patient had when she was in 6 grade.  She reports she fell landing on a glass soda bottle that she had to have bedside removal at that time.  She reports that several months later she did pull further glass out of hand at this time however this was 50+ years ago and patient has had no issues with her hands until the last couple months.  She states it is tender to the touch but she has full function of her thumb and hand.    Right middle finger pain:  Patient also reports pain over the A1 pulley of the right middle finger.  She reports she did have a period of time in which she was noting regular triggering of this finger but with a stretching program there is no longer locking with flexion but she does have some slight limitation in range of motion and significant pain with gripping.  She denies any known injury or trauma she states the symptoms have been present for about 6 months.  She denies any numbness or tingling.      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

## 2025-01-17 NOTE — TELEPHONE ENCOUNTER
Patient need a work excuse letter for missing work to go to appointment 1/16/25 Thursday with ROSA Ruth.  Upload to [facility]

## 2025-01-24 ENCOUNTER — OFFICE VISIT (OUTPATIENT)
Dept: INTERNAL MEDICINE | Age: 65
End: 2025-01-24

## 2025-01-24 ENCOUNTER — HOSPITAL ENCOUNTER (OUTPATIENT)
Age: 65
Setting detail: SPECIMEN
Discharge: HOME | End: 2025-01-24

## 2025-01-24 VITALS
TEMPERATURE: 97.2 F | OXYGEN SATURATION: 97 % | HEIGHT: 68 IN | SYSTOLIC BLOOD PRESSURE: 138 MMHG | DIASTOLIC BLOOD PRESSURE: 85 MMHG | BODY MASS INDEX: 36.07 KG/M2 | WEIGHT: 238 LBS | HEART RATE: 77 BPM

## 2025-01-24 DIAGNOSIS — R30.0 DYSURIA: Primary | ICD-10-CM

## 2025-01-24 DIAGNOSIS — R30.0 DYSURIA: ICD-10-CM

## 2025-01-24 DIAGNOSIS — Z01.818 PRE-OPERATIVE CLEARANCE: ICD-10-CM

## 2025-01-24 RX ORDER — HYDROCODONE BITARTRATE AND ACETAMINOPHEN 5; 325 MG/1; MG/1
1 TABLET ORAL EVERY 6 HOURS PRN
COMMUNITY
Start: 2025-01-24 | End: 2025-01-31

## 2025-01-24 RX ORDER — DIAZEPAM 5 MG/1
5 TABLET ORAL EVERY 8 HOURS PRN
COMMUNITY
Start: 2025-01-24 | End: 2025-01-31

## 2025-01-24 RX ORDER — CEPHALEXIN 500 MG/1
500 CAPSULE ORAL 4 TIMES DAILY
COMMUNITY
Start: 2025-01-24 | End: 2025-01-31

## 2025-01-24 SDOH — ECONOMIC STABILITY: FOOD INSECURITY: WITHIN THE PAST 12 MONTHS, THE FOOD YOU BOUGHT JUST DIDN'T LAST AND YOU DIDN'T HAVE MONEY TO GET MORE.: NEVER TRUE

## 2025-01-24 SDOH — ECONOMIC STABILITY: FOOD INSECURITY: WITHIN THE PAST 12 MONTHS, YOU WORRIED THAT YOUR FOOD WOULD RUN OUT BEFORE YOU GOT MONEY TO BUY MORE.: NEVER TRUE

## 2025-01-24 ASSESSMENT — ENCOUNTER SYMPTOMS
WHEEZING: 0
CONSTIPATION: 0
ABDOMINAL PAIN: 0
SHORTNESS OF BREATH: 0
VOMITING: 0
BACK PAIN: 0
DIARRHEA: 0
COUGH: 0
ABDOMINAL DISTENTION: 0

## 2025-01-24 ASSESSMENT — PATIENT HEALTH QUESTIONNAIRE - PHQ9
SUM OF ALL RESPONSES TO PHQ QUESTIONS 1-9: 0
SUM OF ALL RESPONSES TO PHQ9 QUESTIONS 1 & 2: 0
SUM OF ALL RESPONSES TO PHQ QUESTIONS 1-9: 0
1. LITTLE INTEREST OR PLEASURE IN DOING THINGS: NOT AT ALL
2. FEELING DOWN, DEPRESSED OR HOPELESS: NOT AT ALL

## 2025-01-24 NOTE — PROGRESS NOTES
Attending Physician Statement  I have discussed the care of Keila Segundo, including pertinent history and exam findings with the resident. I have reviewed the key elements of all parts of the encounter with the resident.  I agree with the assessment, and status of the problem list as documented. The plan and orders should include   Orders Placed This Encounter   Procedures    Urinalysis with Reflex to Culture    and this was also documented by the resident.  The medication list was reviewed with the resident and is up to date. The return visit should be in 4 weeks .    Reinier Salgado MD  Attending Physician,  Department of Internal Medicine  [facility]  [facility]      1/24/2025, 1:47 PM  
Paternal Aunt 50        ______________________________________________________________________  Review of Systems   Constitutional:  Negative for chills, fatigue and fever.   Respiratory:  Negative for cough, shortness of breath and wheezing.    Cardiovascular:  Negative for chest pain, palpitations and leg swelling.   Gastrointestinal:  Negative for abdominal distention, abdominal pain, constipation, diarrhea and vomiting.   Musculoskeletal:  Negative for back pain and gait problem.   Skin:  Negative for rash and wound.   Neurological:  Negative for seizures and speech difficulty.   Psychiatric/Behavioral:  Negative for behavioral problems. The patient is not nervous/anxious.       ______________________________________________________________________  Physical Exam  Constitutional:       General: She is not in acute distress.     Appearance: Normal appearance. She is not ill-appearing.   Eyes:      General:         Right eye: No discharge.         Left eye: No discharge.      Conjunctiva/sclera: Conjunctivae normal.   Cardiovascular:      Rate and Rhythm: Normal rate.      Pulses: Normal pulses.      Heart sounds: Normal heart sounds. No murmur heard.  Pulmonary:      Effort: Pulmonary effort is normal. No respiratory distress.      Breath sounds: Normal breath sounds. No stridor. No wheezing.   Abdominal:      General: Bowel sounds are normal. There is no distension.      Palpations: There is no mass.      Tenderness: There is no abdominal tenderness.   Musculoskeletal:      Right lower leg: No edema.      Left lower leg: No edema.   Skin:     Coloration: Skin is not jaundiced.      Findings: No rash.   Neurological:      General: No focal deficit present.      Mental Status: She is alert and oriented to person, place, and time.      Cranial Nerves: No cranial nerve deficit.      Sensory: No sensory deficit.      Motor: No weakness.   Psychiatric:         Mood and Affect: Mood normal.         Behavior: Behavior

## 2025-01-25 LAB
BACTERIA URNS QL MICRO: ABNORMAL
BILIRUB UR QL STRIP: NEGATIVE
CASTS #/AREA URNS LPF: ABNORMAL /LPF (ref 0–8)
CLARITY UR: ABNORMAL
COLOR UR: YELLOW
EPI CELLS #/AREA URNS HPF: ABNORMAL /HPF (ref 0–5)
GLUCOSE UR STRIP-MCNC: NEGATIVE MG/DL
HGB UR QL STRIP.AUTO: NEGATIVE
KETONES UR STRIP-MCNC: NEGATIVE MG/DL
LEUKOCYTE ESTERASE UR QL STRIP: ABNORMAL
NITRITE UR QL STRIP: POSITIVE
PH UR STRIP: 5.5 [PH] (ref 5–8)
PROT UR STRIP-MCNC: NEGATIVE MG/DL
RBC #/AREA URNS HPF: ABNORMAL /HPF (ref 0–4)
SP GR UR STRIP: 1.02 (ref 1–1.03)
UROBILINOGEN UR STRIP-ACNC: NORMAL EU/DL (ref 0–1)
WBC #/AREA URNS HPF: ABNORMAL /HPF (ref 0–5)

## 2025-01-26 LAB
MICROORGANISM SPEC CULT: ABNORMAL
SPECIMEN DESCRIPTION: ABNORMAL

## 2025-01-28 DIAGNOSIS — Z85.3 H/O MALIGNANT NEOPLASM OF BREAST: ICD-10-CM

## 2025-01-28 DIAGNOSIS — N30.00 ACUTE CYSTITIS WITHOUT HEMATURIA: Primary | ICD-10-CM

## 2025-01-28 RX ORDER — CEPHALEXIN 500 MG/1
500 CAPSULE ORAL 4 TIMES DAILY
Qty: 20 CAPSULE | Refills: 0 | Status: SHIPPED | OUTPATIENT
Start: 2025-01-28 | End: 2025-02-02

## 2025-01-28 RX ORDER — FLUCONAZOLE 150 MG/1
150 TABLET ORAL DAILY
Qty: 1 TABLET | Refills: 0 | Status: SHIPPED | OUTPATIENT
Start: 2025-01-28

## 2025-01-28 NOTE — PROGRESS NOTES
Patient states she was told that the wrong Mammogram order was placed and needs she needs a Mammogram screening ordered. Order pended. Please advise.

## 2025-01-31 ENCOUNTER — APPOINTMENT (OUTPATIENT)
Dept: CT IMAGING | Age: 65
End: 2025-01-31
Payer: COMMERCIAL

## 2025-01-31 ENCOUNTER — HOSPITAL ENCOUNTER (OUTPATIENT)
Age: 65
Setting detail: OBSERVATION
Discharge: HOME | End: 2025-01-31
Attending: EMERGENCY MEDICINE | Admitting: EMERGENCY MEDICINE
Payer: COMMERCIAL

## 2025-01-31 VITALS
TEMPERATURE: 98.2 F | HEART RATE: 70 BPM | WEIGHT: 243.61 LBS | OXYGEN SATURATION: 99 % | SYSTOLIC BLOOD PRESSURE: 129 MMHG | DIASTOLIC BLOOD PRESSURE: 80 MMHG | BODY MASS INDEX: 36.92 KG/M2 | RESPIRATION RATE: 20 BRPM | HEIGHT: 68 IN

## 2025-01-31 DIAGNOSIS — M51.362 DEGENERATION OF INTERVERTEBRAL DISC OF LUMBAR REGION WITH DISCOGENIC BACK PAIN AND LOWER EXTREMITY PAIN: Primary | ICD-10-CM

## 2025-01-31 DIAGNOSIS — M87.052 AVASCULAR NECROSIS OF LEFT FEMORAL HEAD: ICD-10-CM

## 2025-01-31 DIAGNOSIS — M87.051 AVASCULAR NECROSIS OF RIGHT FEMORAL HEAD: ICD-10-CM

## 2025-01-31 PROBLEM — M54.40 BACK PAIN OF LUMBAR REGION WITH SCIATICA: Status: ACTIVE | Noted: 2025-01-31

## 2025-01-31 LAB
ANION GAP SERPL CALCULATED.3IONS-SCNC: 11 MMOL/L (ref 9–16)
BASOPHILS # BLD: 0.03 K/UL (ref 0–0.2)
BASOPHILS NFR BLD: 0 % (ref 0–2)
BUN SERPL-MCNC: 21 MG/DL (ref 8–23)
CALCIUM SERPL-MCNC: 9.6 MG/DL (ref 8.6–10.4)
CHLORIDE SERPL-SCNC: 103 MMOL/L (ref 98–107)
CO2 SERPL-SCNC: 22 MMOL/L (ref 20–31)
CREAT SERPL-MCNC: 0.9 MG/DL (ref 0.6–0.9)
CRP SERPL HS-MCNC: 23.1 MG/L (ref 0–5)
EOSINOPHIL # BLD: 0.06 K/UL (ref 0–0.44)
EOSINOPHILS RELATIVE PERCENT: 1 % (ref 1–4)
ERYTHROCYTE [DISTWIDTH] IN BLOOD BY AUTOMATED COUNT: 14.6 % (ref 11.8–14.4)
ERYTHROCYTE [SEDIMENTATION RATE] IN BLOOD BY PHOTOMETRIC METHOD: 56 MM/HR (ref 0–30)
GFR, ESTIMATED: 71 ML/MIN/1.73M2
GLUCOSE BLD-MCNC: 107 MG/DL (ref 65–105)
GLUCOSE SERPL-MCNC: 190 MG/DL (ref 74–99)
HCT VFR BLD AUTO: 38.7 % (ref 36.3–47.1)
HGB BLD-MCNC: 12.7 G/DL (ref 11.9–15.1)
IMM GRANULOCYTES # BLD AUTO: 0.03 K/UL (ref 0–0.3)
IMM GRANULOCYTES NFR BLD: 0 %
LYMPHOCYTES NFR BLD: 0.81 K/UL (ref 1.1–3.7)
LYMPHOCYTES RELATIVE PERCENT: 9 % (ref 24–43)
MCH RBC QN AUTO: 29.7 PG (ref 25.2–33.5)
MCHC RBC AUTO-ENTMCNC: 32.8 G/DL (ref 28.4–34.8)
MCV RBC AUTO: 90.4 FL (ref 82.6–102.9)
MONOCYTES NFR BLD: 0.97 K/UL (ref 0.1–1.2)
MONOCYTES NFR BLD: 11 % (ref 3–12)
NEUTROPHILS NFR BLD: 79 % (ref 36–65)
NEUTS SEG NFR BLD: 7.1 K/UL (ref 1.5–8.1)
NRBC BLD-RTO: 0 PER 100 WBC
PLATELET # BLD AUTO: 268 K/UL (ref 138–453)
PMV BLD AUTO: 9.9 FL (ref 8.1–13.5)
POTASSIUM SERPL-SCNC: 4.2 MMOL/L (ref 3.7–5.3)
RBC # BLD AUTO: 4.28 M/UL (ref 3.95–5.11)
RBC # BLD: ABNORMAL 10*6/UL
SODIUM SERPL-SCNC: 136 MMOL/L (ref 136–145)
WBC OTHER # BLD: 9 K/UL (ref 3.5–11.3)

## 2025-01-31 PROCEDURE — 96374 THER/PROPH/DIAG INJ IV PUSH: CPT

## 2025-01-31 PROCEDURE — 86140 C-REACTIVE PROTEIN: CPT

## 2025-01-31 PROCEDURE — G0378 HOSPITAL OBSERVATION PER HR: HCPCS

## 2025-01-31 PROCEDURE — 80048 BASIC METABOLIC PNL TOTAL CA: CPT

## 2025-01-31 PROCEDURE — 85652 RBC SED RATE AUTOMATED: CPT

## 2025-01-31 PROCEDURE — 6370000000 HC RX 637 (ALT 250 FOR IP)

## 2025-01-31 PROCEDURE — 82947 ASSAY GLUCOSE BLOOD QUANT: CPT

## 2025-01-31 PROCEDURE — 2500000003 HC RX 250 WO HCPCS

## 2025-01-31 PROCEDURE — 74176 CT ABD & PELVIS W/O CONTRAST: CPT

## 2025-01-31 PROCEDURE — 99285 EMERGENCY DEPT VISIT HI MDM: CPT

## 2025-01-31 PROCEDURE — 94640 AIRWAY INHALATION TREATMENT: CPT

## 2025-01-31 PROCEDURE — 85025 COMPLETE CBC W/AUTO DIFF WBC: CPT

## 2025-01-31 PROCEDURE — 6360000002 HC RX W HCPCS

## 2025-01-31 RX ORDER — KETOROLAC TROMETHAMINE 30 MG/ML
30 INJECTION, SOLUTION INTRAMUSCULAR; INTRAVENOUS ONCE
Status: COMPLETED | OUTPATIENT
Start: 2025-01-31 | End: 2025-01-31

## 2025-01-31 RX ORDER — AMLODIPINE BESYLATE 10 MG/1
10 TABLET ORAL DAILY
Status: DISCONTINUED | OUTPATIENT
Start: 2025-01-31 | End: 2025-01-31 | Stop reason: HOSPADM

## 2025-01-31 RX ORDER — GLUCAGON 1 MG/ML
1 KIT INJECTION PRN
Status: DISCONTINUED | OUTPATIENT
Start: 2025-01-31 | End: 2025-01-31 | Stop reason: HOSPADM

## 2025-01-31 RX ORDER — ENOXAPARIN SODIUM 100 MG/ML
30 INJECTION SUBCUTANEOUS 2 TIMES DAILY
Status: DISCONTINUED | OUTPATIENT
Start: 2025-01-31 | End: 2025-01-31 | Stop reason: HOSPADM

## 2025-01-31 RX ORDER — ATORVASTATIN CALCIUM 80 MG/1
80 TABLET, FILM COATED ORAL DAILY
Status: DISCONTINUED | OUTPATIENT
Start: 2025-01-31 | End: 2025-01-31 | Stop reason: HOSPADM

## 2025-01-31 RX ORDER — ONDANSETRON 4 MG/1
4 TABLET, ORALLY DISINTEGRATING ORAL EVERY 8 HOURS PRN
Status: DISCONTINUED | OUTPATIENT
Start: 2025-01-31 | End: 2025-01-31 | Stop reason: HOSPADM

## 2025-01-31 RX ORDER — CEPHALEXIN 500 MG/1
500 CAPSULE ORAL EVERY 12 HOURS SCHEDULED
Status: DISCONTINUED | OUTPATIENT
Start: 2025-01-31 | End: 2025-01-31 | Stop reason: HOSPADM

## 2025-01-31 RX ORDER — LIDOCAINE 4 G/G
1 PATCH TOPICAL ONCE
Status: COMPLETED | OUTPATIENT
Start: 2025-01-31 | End: 2025-01-31

## 2025-01-31 RX ORDER — ONDANSETRON 2 MG/ML
4 INJECTION INTRAMUSCULAR; INTRAVENOUS EVERY 6 HOURS PRN
Status: DISCONTINUED | OUTPATIENT
Start: 2025-01-31 | End: 2025-01-31 | Stop reason: HOSPADM

## 2025-01-31 RX ORDER — OXYCODONE AND ACETAMINOPHEN 5; 325 MG/1; MG/1
1 TABLET ORAL ONCE
Status: COMPLETED | OUTPATIENT
Start: 2025-01-31 | End: 2025-01-31

## 2025-01-31 RX ORDER — DEXTROSE MONOHYDRATE 100 MG/ML
INJECTION, SOLUTION INTRAVENOUS CONTINUOUS PRN
Status: DISCONTINUED | OUTPATIENT
Start: 2025-01-31 | End: 2025-01-31 | Stop reason: HOSPADM

## 2025-01-31 RX ORDER — CEPHALEXIN 500 MG/1
500 CAPSULE ORAL EVERY 6 HOURS SCHEDULED
Status: DISCONTINUED | OUTPATIENT
Start: 2025-01-31 | End: 2025-01-31

## 2025-01-31 RX ORDER — OXYCODONE AND ACETAMINOPHEN 5; 325 MG/1; MG/1
1 TABLET ORAL EVERY 4 HOURS PRN
Status: DISCONTINUED | OUTPATIENT
Start: 2025-01-31 | End: 2025-01-31 | Stop reason: HOSPADM

## 2025-01-31 RX ORDER — SODIUM CHLORIDE 0.9 % (FLUSH) 0.9 %
5-40 SYRINGE (ML) INJECTION PRN
Status: DISCONTINUED | OUTPATIENT
Start: 2025-01-31 | End: 2025-01-31 | Stop reason: HOSPADM

## 2025-01-31 RX ORDER — ENOXAPARIN SODIUM 100 MG/ML
40 INJECTION SUBCUTANEOUS DAILY
Status: DISCONTINUED | OUTPATIENT
Start: 2025-01-31 | End: 2025-01-31 | Stop reason: DRUGHIGH

## 2025-01-31 RX ORDER — SODIUM CHLORIDE 9 MG/ML
INJECTION, SOLUTION INTRAVENOUS PRN
Status: DISCONTINUED | OUTPATIENT
Start: 2025-01-31 | End: 2025-01-31 | Stop reason: HOSPADM

## 2025-01-31 RX ORDER — BUDESONIDE AND FORMOTEROL FUMARATE DIHYDRATE 80; 4.5 UG/1; UG/1
2 AEROSOL RESPIRATORY (INHALATION)
Status: DISCONTINUED | OUTPATIENT
Start: 2025-01-31 | End: 2025-01-31 | Stop reason: HOSPADM

## 2025-01-31 RX ORDER — CYCLOBENZAPRINE HCL 10 MG
10 TABLET ORAL ONCE
Status: COMPLETED | OUTPATIENT
Start: 2025-01-31 | End: 2025-01-31

## 2025-01-31 RX ORDER — CARVEDILOL 12.5 MG/1
12.5 TABLET ORAL 2 TIMES DAILY WITH MEALS
Status: DISCONTINUED | OUTPATIENT
Start: 2025-01-31 | End: 2025-01-31 | Stop reason: HOSPADM

## 2025-01-31 RX ORDER — METHOCARBAMOL 750 MG/1
750 TABLET, FILM COATED ORAL 3 TIMES DAILY
Qty: 9 TABLET | Refills: 0 | Status: SHIPPED | OUTPATIENT
Start: 2025-01-31 | End: 2025-02-03

## 2025-01-31 RX ORDER — ALBUTEROL SULFATE 90 UG/1
2 INHALANT RESPIRATORY (INHALATION) DAILY
Status: DISCONTINUED | OUTPATIENT
Start: 2025-01-31 | End: 2025-01-31 | Stop reason: HOSPADM

## 2025-01-31 RX ORDER — LIDOCAINE 4 G/G
1 PATCH TOPICAL ONCE
Qty: 1 EACH | Refills: 0 | Status: SHIPPED | OUTPATIENT
Start: 2025-01-31 | End: 2025-01-31

## 2025-01-31 RX ORDER — ACETAMINOPHEN 325 MG/1
650 TABLET ORAL EVERY 4 HOURS PRN
Status: DISCONTINUED | OUTPATIENT
Start: 2025-01-31 | End: 2025-01-31 | Stop reason: HOSPADM

## 2025-01-31 RX ORDER — SODIUM CHLORIDE 0.9 % (FLUSH) 0.9 %
5-40 SYRINGE (ML) INJECTION EVERY 12 HOURS SCHEDULED
Status: DISCONTINUED | OUTPATIENT
Start: 2025-01-31 | End: 2025-01-31 | Stop reason: HOSPADM

## 2025-01-31 RX ORDER — GLIPIZIDE 5 MG/1
5 TABLET ORAL
Status: DISCONTINUED | OUTPATIENT
Start: 2025-01-31 | End: 2025-01-31 | Stop reason: HOSPADM

## 2025-01-31 RX ORDER — LISINOPRIL 20 MG/1
40 TABLET ORAL DAILY
Status: DISCONTINUED | OUTPATIENT
Start: 2025-01-31 | End: 2025-01-31 | Stop reason: HOSPADM

## 2025-01-31 RX ORDER — SPIRONOLACTONE 25 MG/1
50 TABLET ORAL DAILY
Status: DISCONTINUED | OUTPATIENT
Start: 2025-01-31 | End: 2025-01-31 | Stop reason: HOSPADM

## 2025-01-31 RX ADMIN — ATORVASTATIN CALCIUM 80 MG: 80 TABLET, FILM COATED ORAL at 09:31

## 2025-01-31 RX ADMIN — BUDESONIDE AND FORMOTEROL FUMARATE DIHYDRATE 2 PUFF: 80; 4.5 AEROSOL RESPIRATORY (INHALATION) at 08:08

## 2025-01-31 RX ADMIN — SODIUM CHLORIDE, PRESERVATIVE FREE 10 ML: 5 INJECTION INTRAVENOUS at 09:33

## 2025-01-31 RX ADMIN — LISINOPRIL 40 MG: 20 TABLET ORAL at 09:31

## 2025-01-31 RX ADMIN — GLIPIZIDE 5 MG: 5 TABLET ORAL at 09:30

## 2025-01-31 RX ADMIN — CYCLOBENZAPRINE 10 MG: 10 TABLET, FILM COATED ORAL at 01:06

## 2025-01-31 RX ADMIN — CARVEDILOL 12.5 MG: 12.5 TABLET, FILM COATED ORAL at 09:32

## 2025-01-31 RX ADMIN — KETOROLAC TROMETHAMINE 30 MG: 30 INJECTION, SOLUTION INTRAMUSCULAR; INTRAVENOUS at 01:06

## 2025-01-31 RX ADMIN — ALBUTEROL SULFATE 2 PUFF: 90 AEROSOL, METERED RESPIRATORY (INHALATION) at 08:08

## 2025-01-31 RX ADMIN — METFORMIN HYDROCHLORIDE 1000 MG: 500 TABLET ORAL at 09:30

## 2025-01-31 RX ADMIN — CEPHALEXIN 500 MG: 500 CAPSULE ORAL at 09:32

## 2025-01-31 RX ADMIN — OXYCODONE HYDROCHLORIDE AND ACETAMINOPHEN 1 TABLET: 5; 325 TABLET ORAL at 03:06

## 2025-01-31 RX ADMIN — AMLODIPINE BESYLATE 10 MG: 10 TABLET ORAL at 09:30

## 2025-01-31 RX ADMIN — OXYCODONE HYDROCHLORIDE AND ACETAMINOPHEN 1 TABLET: 5; 325 TABLET ORAL at 09:31

## 2025-01-31 RX ADMIN — SPIRONOLACTONE 50 MG: 25 TABLET, FILM COATED ORAL at 09:30

## 2025-01-31 ASSESSMENT — PAIN DESCRIPTION - LOCATION
LOCATION: HIP

## 2025-01-31 ASSESSMENT — PAIN - FUNCTIONAL ASSESSMENT
PAIN_FUNCTIONAL_ASSESSMENT: 0-10
PAIN_FUNCTIONAL_ASSESSMENT: PREVENTS OR INTERFERES SOME ACTIVE ACTIVITIES AND ADLS

## 2025-01-31 ASSESSMENT — PAIN SCALES - GENERAL
PAINLEVEL_OUTOF10: 6
PAINLEVEL_OUTOF10: 6
PAINLEVEL_OUTOF10: 4
PAINLEVEL_OUTOF10: 10

## 2025-01-31 ASSESSMENT — PAIN DESCRIPTION - ORIENTATION
ORIENTATION: RIGHT
ORIENTATION: RIGHT

## 2025-01-31 ASSESSMENT — ENCOUNTER SYMPTOMS
NAUSEA: 0
BACK PAIN: 1
SHORTNESS OF BREATH: 0

## 2025-01-31 ASSESSMENT — PAIN DESCRIPTION - ONSET: ONSET: ON-GOING

## 2025-01-31 ASSESSMENT — PAIN DESCRIPTION - FREQUENCY: FREQUENCY: CONTINUOUS

## 2025-01-31 ASSESSMENT — PAIN DESCRIPTION - DESCRIPTORS
DESCRIPTORS: ACHING
DESCRIPTORS: ACHING

## 2025-01-31 ASSESSMENT — PAIN DESCRIPTION - PAIN TYPE: TYPE: ACUTE PAIN

## 2025-01-31 NOTE — ED NOTES
Pt presents to the ER via EMS. Pt c/o R. Sided hip pain starting at 5 AM yesterday. Pt states she woke up with the pain and denies any injury to the area. EMS states patient was able to ambulate at scene with help of bystanders. Pt on arrival unable to move from stretcher to bed. Pt moved over on arrival. Pt otherwise A&O x4, in NAD, VSS. IV established labs drawn. Call light within reach.

## 2025-01-31 NOTE — DISCHARGE INSTRUCTIONS
You were evaluated for your low back pain.  On CT imaging, there was no acute bony injury.  You did have degenerative changes of your lower spine as well as avascular necrosis of bilateral femurs.  These are not new findings.  You prescribed lidocaine patches and Robaxin, a muscle relaxer, for your symptoms.  Please take these as prescribed.  You may also take over-the-counter pain medications has not as you have not been instructed otherwise by a different provider.  Follow-up with neurosurgery in 6 weeks for reevaluation.  You are also provided information for orthopedic surgery if your symptoms persist.  Return to the emergency department if you develop fever, worsening back pain, incontinence, numbness or weakness of your legs, or other concerning symptoms.

## 2025-01-31 NOTE — PLAN OF CARE
Problem: Discharge Planning  Goal: Discharge to home or other facility with appropriate resources  Outcome: Completed  Flowsheets (Taken 1/31/2025 1146)  Discharge to home or other facility with appropriate resources:   Identify barriers to discharge with patient and caregiver   Arrange for needed discharge resources and transportation as appropriate   Identify discharge learning needs (meds, wound care, etc)     Problem: Pain  Goal: Verbalizes/displays adequate comfort level or baseline comfort level  Outcome: Completed  Flowsheets (Taken 1/31/2025 0636 by Marnie Delgado RN)  Verbalizes/displays adequate comfort level or baseline comfort level:   Encourage patient to monitor pain and request assistance   Administer analgesics based on type and severity of pain and evaluate response   Assess pain using appropriate pain scale     Problem: Safety - Adult  Goal: Free from fall injury  Outcome: Completed     Problem: ABCDS Injury Assessment  Goal: Absence of physical injury  Outcome: Completed

## 2025-01-31 NOTE — ED PROVIDER NOTES
[facility]     Emergency Department     Faculty Attestation    I performed a history and physical examination of the patient and discussed management with the resident. I have reviewed and agree with the resident’s findings including all diagnostic interpretations, and treatment plans as written. Any areas of disagreement are noted on the chart. I was personally present for the key portions of any procedures. I have documented in the chart those procedures where I was not present during the key portions. I have reviewed the emergency nurses triage note. I agree with the chief complaint, past medical history, past surgical history, allergies, medications, social and family history as documented unless otherwise noted below. Documentation of the HPI, Physical Exam and Medical Decision Making performed by scribvasu is based on my personal performance of the HPI, PE and MDM. For Physician Assistant/ Nurse Practitioner cases/documentation I have personally evaluated this patient and have completed at least one if not all key elements of the E/M (history, physical exam, and MDM). Additional findings are as noted.    Note Started: 12:57 AM EST     65 yo F R posterior hip pain, onset rather quick, no fever, no injury,   PE vss gcs 15 Constantin RN present, tender R posterior hip, r lateral to L45, skin intact, no rash, no indication of infection, or injury, NV intact right lower extremity  ---ct stable, pt unable to ambulated d/t low back pain, consult neurosurgery, obs   EKG Interpretation    Interpreted by me      CRITICAL CARE: There was a high probability of clinically significant/life threatening deterioration in this patient's condition which required my urgent intervention.  Total critical care time was 0 minutes.  This excludes any time for separately reportable procedures.       Ezequiel Ayoub DO  01/31/25 0105       Ezequiel Alvarez,    01/31/25 0251       Ezequiel Alvarez,   01/31/25 0521

## 2025-01-31 NOTE — CONSULTS
Department of Neurosurgery                                            Nurse Practitioner Consult Note      Reason for Consult:  sudden onset right sided hip and low back pain, difficulty ambulating, CTL showing DDD  Requesting Physician:    Neurosurgeon:   [] Dr. Galan  [] Dr. Harris  [x] Dr. Carty  [] Dr. Mcelroy      History Obtained From:  patient, electronic medical record    CHIEF COMPLAINT:         Chief Complaint   Patient presents with    Hip Pain     R.       HISTORY OF PRESENT ILLNESS:       The patient is a 64 y.o. female with PMH of congenital kidney disease (left kidney removed), breast cancer s/p lumpectomy, chemotherapy and radiation, neuropathy, CHF, who presents with sudden onset low back and right hip pain with difficulty ambulating. Yesterday morning patient states that she woke up with sudden onset right lower back pain. Denies any injuries or falls. Went to work and it progressively worsened throughout the day. States that it has caused significant difficulty with ambulation. Called EMS and was able to walk with assistance, however upon arrival to ED patient was unable to move from the stretcher to the ED bed due to the pain. Patient states that the pain is continuous. Denies any saddle anesthesia, loss of bowel or bladder function, or urinary retention.       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    DM (diabetes mellitus) (HCC)     High cholesterol     Hip pain, bilateral     History of anemia     History of blood transfusion     Hypertension     Neuropathy     Severe obesity (BMI 35.0-39.9) with comorbidity 04/05/2023    Snores     Type II or unspecified type diabetes mellitus without mention of complication, not stated as  5-325 MG per tablet 1 tablet, 1 tablet, Oral, Q4H PRN  enoxaparin Sodium (LOVENOX) injection 30 mg, 30 mg, SubCUTAneous, BID  amLODIPine (NORVASC) tablet 10 mg, 10 mg, Oral, Daily  atorvastatin (LIPITOR) tablet 80 mg, 80 mg, Oral, Daily  lisinopril (PRINIVIL;ZESTRIL) tablet 40 mg, 40 mg, Oral, Daily  carvedilol (COREG) tablet 12.5 mg, 12.5 mg, Oral, BID WC  budesonide-formoterol (SYMBICORT) 80-4.5 MCG/ACT inhaler 2 puff, 2 puff, Inhalation, BID RT  glipiZIDE (GLUCOTROL) tablet 5 mg, 5 mg, Oral, QAM AC  metFORMIN (GLUCOPHAGE) tablet 1,000 mg, 1,000 mg, Oral, BID WC  spironolactone (ALDACTONE) tablet 50 mg, 50 mg, Oral, Daily  albuterol sulfate HFA (PROVENTIL;VENTOLIN;PROAIR) 108 (90 Base) MCG/ACT inhaler 2 puff, 2 puff, Inhalation, Daily    REVIEW OF SYSTEMS:       CONSTITUTIONAL: negative for fatigue and malaise   EYES: negative for double vision and photophobia    HEENT: negative for tinnitus and sore throat   RESPIRATORY: negative for cough, shortness of breath   CARDIOVASCULAR: negative for chest pain, palpitations   GASTROINTESTINAL: negative for nausea, vomiting   GENITOURINARY: negative for incontinence   MUSCULOSKELETAL: Positive for back pain   NEUROLOGICAL: negative for seizures   PSYCHIATRIC: negative for agitated     Review of systems otherwise negative.    PHYSICAL EXAM:       /80   Pulse 70   Temp 98.2 °F (36.8 °C) (Oral)   Resp 20   Ht 1.727 m (5' 8\")   Wt 110.5 kg (243 lb 9.7 oz)   SpO2 99%   BMI 37.04 kg/m²       CONSTITUTIONAL: no apparent distress, appears stated age   HEAD: normocephalic, atraumatic   ENT: moist mucous membranes, uvula midline   NECK: supple, symmetric, no midline tenderness to palpation   BACK: Positive tenderness right lower back/hip   LUNGS: clear to auscultation bilaterally   CARDIOVASCULAR: regular rate and rhythm   ABDOMEN: Soft, non-tender, non-distended with normal active bowel sounds   NEUROLOGIC:  EYE OPENING     Spontaneous - 4 [x]       To voice - 3 []

## 2025-01-31 NOTE — CARE COORDINATION
Case Management Assessment  Initial Evaluation    Date/Time of Evaluation: 1/31/2025 10:09 AM  Assessment Completed by: THEE NORTON RN    If patient is discharged prior to next notation, then this note serves as note for discharge by case management.    Patient Name: Keila Segundo                   YOB: 1960  Diagnosis: Avascular necrosis of right femoral head [M87.051]  Avascular necrosis of left femoral head [M87.052]  Back pain of lumbar region with sciatica [M54.40]  Degeneration of intervertebral disc of lumbar region with discogenic back pain and lower extremity pain [M51.362]                   Date / Time: 1/31/2025 12:40 AM    Patient Admission Status: Observation   Readmission Risk (Low < 19, Mod (19-27), High > 27): No data recorded  Current PCP: Brandy Blanco MD  PCP verified by CM? (P) Yes    Chart Reviewed: Yes      History Provided by: (P) Patient  Patient Orientation: (P) Alert and Oriented    Patient Cognition: (P) Alert    Hospitalization in the last 30 days (Readmission):  No    If yes, Readmission Assessment in CM Navigator will be completed.    Advance Directives:      Code Status: Full Code   Patient's Primary Decision Maker is: (P) Legal Next of Kin    Primary Decision Maker: SHELBIE RIZO - Child - [phone]    Secondary Decision Maker: Shruthi Segundo - Child - [phone]    Discharge Planning:    Patient lives with: (P) Alone Type of Home: (P) House  Primary Care Giver: (P) Self  Patient Support Systems include: (P) Children   Current Financial resources: (P) None  Current community resources: (P) None  Current services prior to admission: (P) None            Current DME:              Type of Home Care services:  (P) None    ADLS  Prior functional level: (P) Independent in ADLs/IADLs  Current functional level: (P) Independent in ADLs/IADLs    PT AM-PAC:   /24  OT AM-PAC:   /24    Family can provide assistance at DC: (P) Yes  Would you like Case Management to discuss  plan. Freedom of choice list with basic dialogue that supports the patient's individualized plan of care/goals and shares the quality data associated with the providers was provided to:     Patient Representative Name:       The Patient and/or Patient Representative Agree with the Discharge Plan?      THEE NORTON RN  Case Management Department  Ph: [phone] Fax: [phone]

## 2025-01-31 NOTE — ED PROVIDER NOTES
[facility] EMERGENCY DEPARTMENT  Emergency Department Encounter  Emergency Medicine Resident     Pt Name:Keila Segundo  MRN: [id number]  Birthdate 1960  Date of evaluation: 1/31/25  PCP:  Brandy Blanco MD  Note Started: 1:12 AM EST      CHIEF COMPLAINT       Chief Complaint   Patient presents with    Hip Pain     R.       HISTORY OF PRESENT ILLNESS  (Location/Symptom, Timing/Onset, Context/Setting, Quality, Duration, Modifying Factors, Severity.)      Keila Segundo is a 64 y.o. female who presents with sudden onset of right lower back pain at 5 AM yesterday upon wakening.  Patient denies traumatic injury or falls.  Pain is described as sharp, radiates posteriorly down her right leg to her knee.  She has been able to ambulate with some difficulty secondary to pain.  The pain is described as sharp, unrelenting, aggravated by movement, no relieving factors.  She denies fever, chills, myalgias or night sweats.  No recent illnesses.  Denies saddle anesthesia, loss of bowel/bladder function, urinary retention.    PAST MEDICAL / SURGICAL / SOCIAL / FAMILY HISTORY      has a past medical history of Acute congestive heart failure (HCC), Allergic rhinitis, Avascular necrosis, Breast cancer (HCC), Congenital kidney disease, DM (diabetes mellitus) (HCC), High cholesterol, Hip pain, bilateral, History of anemia, Hypertension, Neuropathy, Severe obesity (BMI 35.0-39.9) with comorbidity, Snores, Type II or unspecified type diabetes mellitus without mention of complication, not stated as uncontrolled, and Uterine fibroid.       has a past surgical history that includes Hysterectomy (2000); partial nephrectomy; Colonoscopy; Breast lumpectomy (Left, 2000); Ankle fracture surgery (Right); and Upper gastrointestinal endoscopy.      Social History     Socioeconomic History    Marital status:      Spouse name: Not on file    Number of children: Not on file    Years of education: Not on file    Highest education

## 2025-01-31 NOTE — PROGRESS NOTES
Pharmacy Note     Enoxaparin Dose Adjustment    Keila Segundo is a 64 y.o. female. Pharmacist assessment of enoxaparin dose for VTE prophylaxis.    Recent Labs     01/31/25  0110   BUN 21       Recent Labs     01/31/25  0110   CREATININE 0.9       Estimated Creatinine Clearance: 79 mL/min (based on SCr of 0.9 mg/dL).  Estimated CrCl using Ideal Body Weight: 62 mL/min (based on IBW 63.9 kg)    Height:   Ht Readings from Last 1 Encounters:   01/24/25 1.727 m (5' 7.99\")     Weight:  Wt Readings from Last 1 Encounters:   01/24/25 108 kg (238 lb)       The following enoxaparin dose has been adjusted based upon renal function and/or patient weight per P&T Guidelines:             Lovenox dose adjusted from 40 mg daily to 30 mg BID.       Dequan Starr RPH BCPS  1/31/2025   5:21 AM

## 2025-01-31 NOTE — PROGRESS NOTES
[facility]  CDU / OBSERVATION ENCOUNTER  ATTENDING NOTE       I performed a history and physical examination of the patient and discussed management with the resident or midlevel provider. I reviewed the resident or midlevel provider's note and agree with the documented findings and plan of care. Any areas of disagreement are noted on the chart. I was personally present for the key portions of any procedures. I have documented in the chart those procedures where I was not present during the key portions. I have reviewed the nurses notes. I agree with the chief complaint, past medical history, past surgical history, allergies, medications, social and family history as documented unless otherwise noted below.    The Family history, social history, and ROS are effectively unchanged since admission unless noted elsewhere in the chart.    This patient was placed in the observation unit for reevaluation for possible admission to the hospital    Patient admitted to the ETU for right sided hip and low back pain with difficulty with ambulation.  CT scan of the lumbar spine shows degenerative disc disease.  Neurosurgery has been consulted.    Patient says she is continuing to have pain this morning but has no new complaints.  Will await neurosurgery recommendations and continue pain control.    Sara Garces MD  Attending Emergency  Physician

## 2025-01-31 NOTE — FLOWSHEET NOTE
Pt admitted from ER to room  16. Pt oriented to room and call light use. No Family present. Telemetry applied and connected to monitor. O Assessment completed and vital signs obtained. Plan of care reviewed with pt. No acute distress noted. Pt assisted to and from commode to urinate.

## 2025-01-31 NOTE — DISCHARGE SUMMARY
CDU Discharge Summary        Patient:  Keila Segundo  YOB: 1960    MRN: [id number]   Acct: [id number]    Primary Care Physician: Brandy Blanco MD    Admit date:  1/31/2025 12:40 AM  Discharge date: 01/31/25      Discharge Diagnoses:     Patient had right lower back pain without injury onset due to unknown.  Treated with symptomatic management.  Patient's symptoms are are improved with the plan to discharge home.    Follow-up:  Follow up with PCP. Please return to the emergency room with any new or worsening symptoms.    Stressed to patient the importance of following up with primary care doctor for further workup/management of symptoms.  Pt verbalizes understanding and agrees with plan.    Discharge Medications:           Medication List        START taking these medications      lidocaine 4 % external patch  Place 1 patch onto the skin 1 time for 1 dose     methocarbamol 750 MG tablet  Commonly known as: Robaxin-750  Take 1 tablet by mouth 3 times daily for 3 days            CHANGE how you take these medications      cephALEXin 500 MG capsule  Commonly known as: KEFLEX  Take 1 capsule by mouth 4 times daily for 5 days  What changed: Another medication with the same name was removed. Continue taking this medication, and follow the directions you see here.            CONTINUE taking these medications      acetaminophen 500 MG tablet  Commonly known as: TYLENOL  Take 2 tablets by mouth 3 times daily for 4 days     albuterol sulfate  (90 Base) MCG/ACT inhaler  Commonly known as: PROVENTIL;VENTOLIN;PROAIR  Inhale 2 puffs into the lungs every 4 hours as needed for Shortness of Breath or Wheezing     Alcohol Prep 70 % Pads  Use one pad daily to clean skin before checking blood sugar; use one additional pad to cleanse skin before injecting Trulicity once weekly     amLODIPine 10 MG tablet  Commonly known as: NORVASC  Take 1 tablet by mouth daily     atorvastatin 80 MG tablet  Commonly known as:  within the left hepatic lobe, most likely benign cysts.  The liver is otherwise unremarkable in noncontrast appearance.  The gallbladder is unremarkable in CT appearance.  The spleen is normal.  The pancreas is unremarkable, without evidence of acute inflammatory change.  There is mild adrenal form enlargement of the bilateral adrenal glands, likely secondary to either hyperplasia or benign adenomatous disease.  The patient is status post left nephrectomy.  There is compensatory hypertrophy of the right kidney.  The right kidney is otherwise unremarkable in noncontrast appearance, without evidence of a renal or ureteral calculus or hydronephrosis. GI/Bowel: Evaluation of the hollow GI tract demonstrates no evidence of abnormal bowel wall thickening, dilatation, or obstruction.  The appendix is normal.  There is colonic diverticulosis, though no evidence of diverticulitis. Pelvis: The urinary bladder is unremarkable, without evidence of wall thickening or an intraluminal calculus or mass.  The uterus is surgically absent.  The bilateral adnexa are unremarkable.  There is no free pelvic fluid.  Scattered phleboliths are noted.  No pathologic pelvic lymphadenopathy is identified. Peritoneum/Retroperitoneum: No intraperitoneal free air or free fluid is identified.  No pathologic lymphadenopathy is seen.  There is mild atherosclerotic disease of the abdominal aorta, without evidence of aneurysm. There is mild diastasis recti, without a significant abdominal wall hernia. Bones/Soft Tissues: There is mild degenerative change throughout the visualized thoracolumbar spine.  There is a mild degenerative anterolisthesis of L4 on L5, likely chronic.  There is avascular necrosis of the bilateral femoral heads, left worse than right.  No osteolytic or osteoblastic lesion is identified.     1. No acute intra-abdominal or intrapelvic pathology is identified. Specifically, there is no evidence of a renal or ureteral calculus or

## 2025-01-31 NOTE — H&P
pain, holding the right lower back.  She states the pain radiates posteriorly down her right leg.  Plan for admission to the observation unit, PT/OT, neurosurgery consult.  [BG]       DIAGNOSTIC RESULTS     RADIOLOGY:   I directly visualized the following  images and reviewed the radiologist interpretations:    CT LUMBAR SPINE BONY RECONSTRUCTION    Result Date: 1/31/2025  EXAMINATION: CT OF THE LUMBAR SPINE WITHOUT CONTRAST  1/31/2025 TECHNIQUE: CT of the lumbar spine was performed without the administration of intravenous contrast. Multiplanar reformatted images are provided for review. Adjustment of mA and/or kV according to patient size was utilized.  Automated exposure control, iterative reconstruction, and/or weight based adjustment of the mA/kV was utilized to reduce the radiation dose to as low as reasonably achievable. COMPARISON: None. HISTORY: ORDERING SYSTEM PROVIDED HISTORY: sudden onset intense right lower back pain with sciatica TECHNOLOGIST PROVIDED HISTORY: sudden onset intense right lower back pain with sciatica FINDINGS: BONES/ALIGNMENT: There is no acute fracture or subluxation.  There is a mild approximate 3-4 mm anterolisthesis of L4 on L5, most likely degenerative in etiology.  No additional abnormal listhesis is identified.  The vertebral bodies are otherwise normal in height and alignment.  The posterior elements are intact and aligned.  No destructive osseous lesion is seen.  There is mild asymmetric right sacroiliitis. DEGENERATIVE CHANGES: There is mild multilevel spondylosis, most notable at L2 through L4.  The lumbar disc spaces are preserved.  Multilevel degenerative changes are as follows: At T12-L1, the disc space is unremarkable. At L1-L2, there is a minimal right foraminal disc protrusion into mild asymmetric right neuroforaminal stenosis.  However, there is no evidence of central canal or left neural foraminal stenosis. At L2-L3, there is a minimal diffuse disc bulge.  However,  All other components within normal limits   POCT GLUCOSE   POCT GLUCOSE   POCT GLUCOSE         CDU IMPRESSION / PLAN   Keila Segundo is a 64 y.o. female who presents with right lower back pain which began 1 day prior to arrival.  Patient's imaging was negative for acute findings.  She was unable to ambulate while in the ED, so patient was admitted to the observation unit for PT/OT evaluation and neurosurgery consult.    Neurosurgery consulted, appreciate their recommendations  Multimodal pain control  PT/OT  Diet: Regular  Continue home medications  Monitor vitals, labs, and imaging    DISPO: pending clinical improvement    CONSULTS:    IP CONSULT TO NEUROSURGERY    PATIENT REFERRED TO:    Jacobo Carty MD  [address] [address]  [phone]    Follow up in 6 week(s)        --  Oksana Galvez, DO   Observation Physician    This dictation was generated by voice recognition computer software.  Although all attempts are made to edit the dictation for accuracy, there may be errors in the transcription that are not intended.

## 2025-01-31 NOTE — ED NOTES
ED to inpatient nurses report      Chief Complaint:  Chief Complaint   Patient presents with    Hip Pain     R.     Present to ED from: EMS    MOA:     LOC: alert and orientated to name, place, date  Mobility: Requires assistance * 1  Oxygen Baseline: RA    Current needs required: None   Pending ED orders: None  Present condition: Stable    Why did the patient come to the ED?   Pt presents to the ER via EMS. Pt c/o R. Sided hip pain starting at 5 AM yesterday. Pt states she woke up with the pain and denies any injury to the area. EMS states patient was able to ambulate at scene with help of bystanders. Pt on arrival unable to move from stretcher to bed. Pt moved over on arrival. Pt otherwise A&O x4, in NAD, VSS. IV established labs drawn. Call light within reach.       What is the plan? Admit for obs/PT/OT  Any procedures or intervention occur? CT, labs  Any safety concerns?? None    Mental Status:  Level of Consciousness: Alert (0)    Psych Assessment:   Psychosocial  Psychosocial (WDL): Within Defined Limits  Vital signs   Vitals:    01/31/25 0044 01/31/25 0054   BP: (!) 168/96    Pulse: 93 93   Resp: 16    Temp: 98.2 °F (36.8 °C)    TempSrc: Oral    SpO2: 97%         Vitals:  Patient Vitals for the past 24 hrs:   BP Temp Temp src Pulse Resp SpO2   01/31/25 0054 -- -- -- 93 -- --   01/31/25 0044 (!) 168/96 98.2 °F (36.8 °C) Oral 93 16 97 %      Visit Vitals  BP (!) 168/96   Pulse 93   Temp 98.2 °F (36.8 °C) (Oral)   Resp 16   SpO2 97%        LDAs:   Peripheral IV 01/31/25 Proximal;Right;Ventral Forearm (Active)       Ambulatory Status:  Presents to emergency department  because of falls (Syncope, seizure, or loss of consciousness): No, Age > 70: No, Altered Mental Status, Intoxication with alcohol or substance confusion (Disorientation, impaired judgment, poor safety awaremess, or inability to follow instructions): No, Impaired Mobility: Ambulates or transfers with assistive devices or assistance; Unable to  by mouth every 6 hours as needed.    LANCETS MISC    1 each by Does not apply route every morning (before breakfast) Test fasting blood sugar once daily    METFORMIN (GLUCOPHAGE) 1000 MG TABLET    Take 1 tablet by mouth 2 times daily (with meals)    SPIRONOLACTONE (ALDACTONE) 50 MG TABLET    Take 1 tablet by mouth daily     Orders Placed This Encounter   Medications    cyclobenzaprine (FLEXERIL) tablet 10 mg    ketorolac (TORADOL) injection 30 mg    lidocaine 4 % external patch 1 patch    oxyCODONE-acetaminophen (PERCOCET) 5-325 MG per tablet 1 tablet       SURGICAL HISTORY       Past Surgical History:   Procedure Laterality Date    ANKLE FRACTURE SURGERY Right     BREAST LUMPECTOMY Left 2000    COLONOSCOPY      at [facility], were negative as per patient    HYSTERECTOMY (CERVIX STATUS UNKNOWN)  2000    secondary to fibroids    PARTIAL NEPHRECTOMY      left in childhood, eventually complete     UPPER GASTROINTESTINAL ENDOSCOPY      years ago       PAST MEDICAL HISTORY       Past Medical History:   Diagnosis Date    Acute congestive heart failure (HCC) 3/3/2022    Allergic rhinitis     Avascular necrosis     bilateral hips    Breast cancer (HCC) 2000    LEFT, s/p lumpectomy, chemo and radiation    Congenital kidney disease     has had left kidney removed as a child    DM (diabetes mellitus) ([facility])     High cholesterol     Hip pain, bilateral     History of anemia     Hypertension     Neuropathy     Severe obesity (BMI 35.0-39.9) with comorbidity 4/5/2023    Snores     Type II or unspecified type diabetes mellitus without mention of complication, not stated as uncontrolled     Uterine fibroid        Labs:  Labs Reviewed   C-REACTIVE PROTEIN - Abnormal; Notable for the following components:       Result Value    CRP 23.1 (*)     All other components within normal limits   SEDIMENTATION RATE - Abnormal; Notable for the following components:    Sed Rate, Automated 56 (*)     All other components within normal limits   CBC

## 2025-02-03 ENCOUNTER — CARE COORDINATION (OUTPATIENT)
Dept: CARE COORDINATION | Age: 65
End: 2025-02-03

## 2025-02-03 NOTE — CARE COORDINATION
Care Transitions Note    Follow Up Call     Attempted to reach patient for transitions of care follow up. Unable to reach patient.    Outreach Attempts:   Did not contact patient due to currently being inpatient at [facility] for pre planned Mastopexy.    Patient: Keila Segundo    Patient : 1960   MRN: [id number]    Reason for Admission: hip pain  Discharge Date: 25  RURS: No data recorded  Last Discharge Facility       Date Complaint Diagnosis Description Type Department Provider    25 Hip Pain Degeneration of intervertebral disc of lumbar region with discogenic back pain and lower extremity pain ... ED to Hosp-Admission (Discharged) (ADMITTED) [facility] Jacobo Granger MD; Marques Alvarez...            Was this an external facility discharge? No    Follow Up Appointment:   Patient does not have a follow up appointment scheduled at time of call.     Future Appointments         Provider Specialty Dept Phone    2025 8:30 AM Brandy Blanco MD Internal Medicine [phone]    2025 3:15 PM Juliano Ramirez DPM Podiatry [phone]            Plan for follow-up on next business day.      Norma Malone LPN

## 2025-02-11 ENCOUNTER — TELEPHONE (OUTPATIENT)
Age: 65
End: 2025-02-11

## 2025-02-11 NOTE — TELEPHONE ENCOUNTER
2/11/2025 - per Epic staff message, patient to be scheduled for a 8 week hospital follow up with Dr. Carty (low back pain, ddd) discharged 1/31/25.   Patient on another call, will call back to schedule appointment.

## 2025-02-12 DIAGNOSIS — I10 ESSENTIAL HYPERTENSION: Chronic | ICD-10-CM

## 2025-02-12 DIAGNOSIS — I10 UNCONTROLLED HYPERTENSION: ICD-10-CM

## 2025-02-12 RX ORDER — CARVEDILOL 12.5 MG/1
12.5 TABLET ORAL 2 TIMES DAILY WITH MEALS
Qty: 180 TABLET | Refills: 0 | Status: SHIPPED | OUTPATIENT
Start: 2025-02-12

## 2025-02-12 RX ORDER — AMLODIPINE BESYLATE 10 MG/1
10 TABLET ORAL DAILY
Qty: 90 TABLET | Refills: 0 | Status: SHIPPED | OUTPATIENT
Start: 2025-02-12

## 2025-02-12 NOTE — TELEPHONE ENCOUNTER
Request for   Requested Prescriptions      No prescriptions requested or ordered in this encounter    .      Please review and e-scribe to pharmacy listed in chart if appropriate. Thank you.      Last Visit Date: 1/24/2025  Next Visit Date: 2/21/2025    Future Appointments   Date Time Provider Department Center   2/21/2025  8:30 AM Brandy Blanco MD [facility] DEP   4/14/2025  3:15 PM [facility]       Health Maintenance   Topic Date Due    Diabetic retinal exam  10/28/2016    Respiratory Syncytial Virus (RSV) Pregnant or age 60 yrs+ (1 - Risk 60-74 years 1-dose series) Never done    Shingles vaccine (2 of 2) 06/22/2024    Flu vaccine (1) 08/01/2024    COVID-19 Vaccine (3 - 2024-25 season) 09/01/2024    Breast cancer screen  10/21/2024    Diabetic Alb to Cr ratio (uACR) test  04/17/2025    Diabetic foot exam  12/10/2025    A1C test (Diabetic or Prediabetic)  01/17/2026    Lipids  01/17/2026    Depression Screen  01/24/2026    GFR test (Diabetes, CKD 3-4, OR last GFR 15-59)  01/31/2026    Colorectal Cancer Screen  05/11/2026    DTaP/Tdap/Td vaccine (2 - Td or Tdap) 04/13/2031    DEXA (modify frequency per FRAX score)  Completed    Pneumococcal 50+ years Vaccine  Completed    Hepatitis C screen  Completed    Hepatitis A vaccine  Aged Out    Hepatitis B vaccine  Aged Out    Hib vaccine  Aged Out    Polio vaccine  Aged Out    Meningococcal (ACWY) vaccine  Aged Out    Depression Monitoring  Discontinued    Pneumococcal 0-49 years Vaccine  Discontinued    HIV screen  Discontinued       Hemoglobin A1C (%)   Date Value   01/17/2025 6.9 (H)   04/17/2024 7.4   07/26/2023 9.8 (A)             ( goal A1C is < 7)   No components found for: \"LABMICR\"  No components found for: \"LDLCHOLESTEROL\", \"LDLCALC\"    (goal LDL is <100)   AST (U/L)   Date Value   01/17/2025 18     ALT (U/L)   Date Value   01/17/2025 18     BUN (mg/dL)   Date Value   01/31/2025 21     BP Readings from Last 3 Encounters:

## 2025-02-20 NOTE — TELEPHONE ENCOUNTER
2/20/2025 - 3rd attempt, tried calling patient to schedule an appointment. Phone either disconnected or patient hung up.

## 2025-04-09 DIAGNOSIS — E11.9 TYPE 2 DIABETES MELLITUS WITHOUT COMPLICATION, WITHOUT LONG-TERM CURRENT USE OF INSULIN: ICD-10-CM

## 2025-04-09 RX ORDER — GLIMEPIRIDE 2 MG/1
TABLET ORAL
Qty: 90 TABLET | Refills: 1 | Status: SHIPPED | OUTPATIENT
Start: 2025-04-09

## 2025-04-09 NOTE — TELEPHONE ENCOUNTER
Occurrence   Basic Metabolic Panel Once 06/26/2024   XR HAND RIGHT (MIN 3 VIEWS) Once 03/13/2025   RIP DIGITAL DIAGNOSTIC W OR WO CAD BILATERAL Once 12/27/2024   XR FOOT RIGHT (MIN 3 VIEWS) Once 01/06/2025   XR FOOT LEFT (MIN 3 VIEWS) Once 01/06/2025   MRI HAND RIGHT WO CONTRAST Once 01/16/2025   RIP LEONIDAS DIGITAL SCREEN BILATERAL Once 01/28/2025               Patient Active Problem List:     Seasonal allergies     HTN (hypertension)     Dyslipidemia     Solitary kidney     H/O malignant neoplasm of breast     Hip pain, bilateral     Postcoital bleeding secondary to vaginal atrophy     Somatic dysfunction of spine, thoracic     Somatic dysfunction of spine, lumbar     History of hysterectomy for benign disease (fibroids)     History of lumpectomy of left breast     Absence of both cervix and uterus, acquired     Vaginal atrophy     Severe obesity (BMI 35.0-39.9) with comorbidity     Type 2 diabetes mellitus with diabetic polyneuropathy, without long-term current use of insulin (HCC)     Malignant neoplasm of lower-inner quadrant of left female breast, unspecified estrogen receptor status     Cancer of breast     Other chest pain     Shortness of breath     Back pain of lumbar region with sciatica

## 2025-05-05 ENCOUNTER — OFFICE VISIT (OUTPATIENT)
Age: 65
End: 2025-05-05
Payer: COMMERCIAL

## 2025-05-05 VITALS
SYSTOLIC BLOOD PRESSURE: 110 MMHG | HEIGHT: 68 IN | OXYGEN SATURATION: 98 % | WEIGHT: 238 LBS | BODY MASS INDEX: 36.07 KG/M2 | HEART RATE: 82 BPM | DIASTOLIC BLOOD PRESSURE: 76 MMHG

## 2025-05-05 DIAGNOSIS — E66.01 MORBID (SEVERE) OBESITY DUE TO EXCESS CALORIES (HCC): ICD-10-CM

## 2025-05-05 DIAGNOSIS — I10 UNCONTROLLED HYPERTENSION: ICD-10-CM

## 2025-05-05 DIAGNOSIS — M87.00 AVASCULAR NECROSIS (HCC): Primary | ICD-10-CM

## 2025-05-05 DIAGNOSIS — E78.5 DYSLIPIDEMIA: Chronic | ICD-10-CM

## 2025-05-05 DIAGNOSIS — I10 ESSENTIAL HYPERTENSION: Chronic | ICD-10-CM

## 2025-05-05 DIAGNOSIS — E11.9 TYPE 2 DIABETES MELLITUS WITHOUT COMPLICATION, WITHOUT LONG-TERM CURRENT USE OF INSULIN (HCC): Chronic | ICD-10-CM

## 2025-05-05 PROCEDURE — 99213 OFFICE O/P EST LOW 20 MIN: CPT

## 2025-05-05 PROCEDURE — 1123F ACP DISCUSS/DSCN MKR DOCD: CPT

## 2025-05-05 PROCEDURE — 3078F DIAST BP <80 MM HG: CPT

## 2025-05-05 PROCEDURE — 3074F SYST BP LT 130 MM HG: CPT

## 2025-05-05 PROCEDURE — 3044F HG A1C LEVEL LT 7.0%: CPT

## 2025-05-05 RX ORDER — BENAZEPRIL HYDROCHLORIDE 40 MG/1
40 TABLET ORAL DAILY
Qty: 90 TABLET | Refills: 0 | Status: SHIPPED | OUTPATIENT
Start: 2025-05-05

## 2025-05-05 RX ORDER — UBIQUINOL 100 MG
CAPSULE ORAL
Qty: 100 EACH | Refills: 3 | Status: SHIPPED | OUTPATIENT
Start: 2025-05-05

## 2025-05-05 RX ORDER — SPIRONOLACTONE 50 MG/1
50 TABLET, FILM COATED ORAL DAILY
Qty: 90 TABLET | Refills: 1 | Status: SHIPPED | OUTPATIENT
Start: 2025-05-05

## 2025-05-05 RX ORDER — AMLODIPINE BESYLATE 10 MG/1
10 TABLET ORAL DAILY
Qty: 90 TABLET | Refills: 0 | Status: SHIPPED | OUTPATIENT
Start: 2025-05-05

## 2025-05-05 RX ORDER — FLUTICASONE FUROATE AND VILANTEROL 100; 25 UG/1; UG/1
1 POWDER RESPIRATORY (INHALATION) DAILY
Qty: 2 EACH | Refills: 2 | Status: SHIPPED | OUTPATIENT
Start: 2025-05-05

## 2025-05-05 RX ORDER — ATORVASTATIN CALCIUM 80 MG/1
80 TABLET, FILM COATED ORAL DAILY
Qty: 90 TABLET | Refills: 1 | Status: SHIPPED | OUTPATIENT
Start: 2025-05-05

## 2025-05-05 RX ORDER — GLIMEPIRIDE 2 MG/1
TABLET ORAL
Qty: 90 TABLET | Refills: 1 | Status: SHIPPED | OUTPATIENT
Start: 2025-05-05

## 2025-05-05 RX ORDER — GLUCOSAMINE HCL/CHONDROITIN SU 500-400 MG
CAPSULE ORAL
Qty: 100 STRIP | Refills: 3 | Status: SHIPPED | OUTPATIENT
Start: 2025-05-05

## 2025-05-05 RX ORDER — DULAGLUTIDE 1.5 MG/.5ML
1.5 INJECTION, SOLUTION SUBCUTANEOUS WEEKLY
Qty: 2 ML | Refills: 2 | Status: SHIPPED | OUTPATIENT
Start: 2025-05-05

## 2025-05-05 RX ORDER — CARVEDILOL 12.5 MG/1
12.5 TABLET ORAL 2 TIMES DAILY WITH MEALS
Qty: 180 TABLET | Refills: 0 | Status: SHIPPED | OUTPATIENT
Start: 2025-05-05

## 2025-05-05 ASSESSMENT — ENCOUNTER SYMPTOMS
BACK PAIN: 0
SHORTNESS OF BREATH: 0
ABDOMINAL PAIN: 0
COUGH: 0
WHEEZING: 0
NAUSEA: 0
VOMITING: 0

## 2025-05-05 NOTE — PROGRESS NOTES
[facility]  [facility]  [address] [place] [address]  Dept: [phone]  Dept Fax: [phone]    Office Progress/Follow Up Note  Date ofpatient's visit: 5/5/2025  Patient's Name:  Keila Segundo YOB: 1960            Patient Care Team:  Brandy Blanco MD as PCP - General (Internal Medicine)  Kristine Carlton MD as PCP - Empaneled Provider  ================================================================    REASON FOR VISIT/CHIEF COMPLAINT:  Pain (Bilateral Hip and back pain /Has been going on for awhile but getting worse )    HISTORY OF PRESENTING ILLNESS:  History was obtained from: patient. Keila Segundois a 64 y.o. is here for a routine follow-up visit.    She has been complaining of bilateral hip pain left> right for the past couple of months.  She initially went to ER for further evaluation.  CT lumbar spine and CT abdomen pelvis were done.  CT lumbar spine showed mild chronic degenerative changes of L4 and L5, multilevel lumbar degenerative disc disease.  CT abdomen pelvis showed avascular necrosis of bilateral femoral head left worse than the right.  Due to her hip pain, she was given referral to orthopedic surgery by her plastic surgeon but patient did not make an appointment with the orthopedic surgery.  She does have the information of Dr. Vega and will schedule an appointment with him for further evaluation of avascular necrosis.  Patient has been using pain medications to help with the pain.  Asked the patient to make an appointment with orthopedic surgery for further evaluation.        Patient Active Problem List   Diagnosis    Seasonal allergies    HTN (hypertension)    Dyslipidemia    Solitary kidney    H/O malignant neoplasm of breast    Hip pain, bilateral    Postcoital bleeding secondary to vaginal atrophy    Somatic dysfunction of spine, thoracic    Somatic dysfunction of spine, lumbar    History of hysterectomy for benign disease

## 2025-05-05 NOTE — PROGRESS NOTES
Attending Physician Statement  I have discussed the care of Keila Segundo, including pertinent history and exam findings with the resident. I have reviewed the key elements of all parts of the encounter with the resident.  I agree with the assessment, and status of the problem list as documented. The plan and orders should include No orders of the defined types were placed in this encounter.   and this was also documented by the resident. The medication list was reviewed with the resident and is up to date. The return visit should be in 4 weeks .    Reinier Salgado MD  Attending Physician,  Department of Internal Medicine  [facility] Internal Medicine  [facility]      5/5/2025, 11:19 AM

## 2025-05-09 NOTE — PROGRESS NOTES
Patient instructed to remove shoes and socks and instructed to sit in exam chair.  Current PCP is Brandy Blanco MD and date of last visit was [id number].   Do you have a follow up visit scheduled?  Yes  If yes, the date is [id number]

## 2025-05-14 ENCOUNTER — TELEPHONE (OUTPATIENT)
Age: 65
End: 2025-05-14

## 2025-05-14 DIAGNOSIS — M87.00 AVASCULAR NECROSIS (HCC): Primary | ICD-10-CM

## 2025-05-14 NOTE — TELEPHONE ENCOUNTER
Patient placed call to office stating she was seen for an acute visit and was advised to make an appointment with her orthopedic doctor she then stated orthopedic doctor will not schedule her an appt until her hips have been x-ray. Routing to physician she seen for acute visit and pcp. Please advise

## 2025-05-16 NOTE — TELEPHONE ENCOUNTER
Patient already has a CT scan of the abdomen which had shown avascular necrosis. This was discussed with the patient as well. Not sure why they need xray?  If they still need the Xray, we can order it.

## 2025-05-27 NOTE — TELEPHONE ENCOUNTER
Patient returned the call to the office with the number to the surgeon. The doctors name is Dr. Vega at [facility], [phone].

## 2025-06-18 ENCOUNTER — TELEPHONE (OUTPATIENT)
Age: 65
End: 2025-06-18

## 2025-06-18 NOTE — TELEPHONE ENCOUNTER
Called and spoke to patient to reschedule her appointment from 6/23/2025 to 7/14/2025. She asked if we could move the time and changed the time to 2:15 pm that day

## 2025-06-18 NOTE — TELEPHONE ENCOUNTER
Called and left a message for patient to reschedule her appointment from 6/23/2025 to 7/14/2025 keeping the same time of 3:15 pm

## 2025-07-02 NOTE — TELEPHONE ENCOUNTER
PC to patient - informed patient that she is on wait list for Dr Shalini Singh. Patient requesting referral to have reconstructive surgery done due to history of breast cancer. no

## 2025-07-13 ENCOUNTER — HOSPITAL ENCOUNTER (OUTPATIENT)
Age: 65
Discharge: HOME | End: 2025-07-15
Payer: COMMERCIAL

## 2025-07-13 ENCOUNTER — HOSPITAL ENCOUNTER (OUTPATIENT)
Dept: GENERAL RADIOLOGY | Age: 65
Discharge: HOME | End: 2025-07-15
Payer: COMMERCIAL

## 2025-07-13 DIAGNOSIS — M79.671 RIGHT FOOT PAIN: ICD-10-CM

## 2025-07-13 DIAGNOSIS — M79.672 LEFT FOOT PAIN: ICD-10-CM

## 2025-07-13 PROCEDURE — 73630 X-RAY EXAM OF FOOT: CPT

## 2025-07-14 ENCOUNTER — OFFICE VISIT (OUTPATIENT)
Age: 65
End: 2025-07-14

## 2025-07-14 VITALS — HEIGHT: 68 IN | WEIGHT: 238 LBS | BODY MASS INDEX: 36.07 KG/M2

## 2025-07-14 DIAGNOSIS — E11.42 TYPE 2 DIABETES MELLITUS WITH DIABETIC POLYNEUROPATHY, UNSPECIFIED WHETHER LONG TERM INSULIN USE (HCC): ICD-10-CM

## 2025-07-14 DIAGNOSIS — T45.1X5S PERIPHERAL NEUROPATHY DUE TO AND NOT CONCURRENT WITH CHEMOTHERAPY: Primary | ICD-10-CM

## 2025-07-14 DIAGNOSIS — M79.672 LEFT FOOT PAIN: ICD-10-CM

## 2025-07-14 DIAGNOSIS — G62.0 PERIPHERAL NEUROPATHY DUE TO AND NOT CONCURRENT WITH CHEMOTHERAPY: Primary | ICD-10-CM

## 2025-07-14 DIAGNOSIS — M79.671 RIGHT FOOT PAIN: ICD-10-CM

## 2025-07-14 DIAGNOSIS — E11.42 DIABETIC POLYNEUROPATHY ASSOCIATED WITH TYPE 2 DIABETES MELLITUS (HCC): ICD-10-CM

## 2025-07-14 DIAGNOSIS — B35.1 ONYCHOMYCOSIS: ICD-10-CM

## 2025-07-14 RX ORDER — CICLOPIROX 2.28 G/ML
SOLUTION TOPICAL
Qty: 6.6 ML | Refills: 5 | Status: SHIPPED | OUTPATIENT
Start: 2025-07-14

## 2025-07-15 NOTE — PROGRESS NOTES
[facility]  [address]   [address] [place] [address]  Tel: [phone]   Fax: [phone]    Subjective     CC: Diabetic foot exam and painful and elongated toe nails    Interval History (07/14/2015):   Patient presents to clinic for follow-up diabetic foot exam, as well as review of her x-rays from last visit.  Patient would also like her toenails trimmed today as they are thickened, elongated and discolored.  Patient also complains of some numbness to her digits accompanied by tingling and burning. Patient denies any constitutional symptoms or other pedal complaints at this time.     HPI:  Keila Segundo is a 64 y.o. year old female who presents to clinic today for diabetic foot examination and symptoms of numbness and tingling.  The patient is a diabetic, and they're last HgbA1c was 7.4% in (4/17/2024).  The patient explains that she has been dealing with increasing numbness and tingling to her bilateral feet, right worse than the left, over the last several years.  While the patient is diabetic she also admits to undergoing chemotherapy in the past. Patient denies any rest pain or claudication symptoms. The patient denies any acute trauma but does admit to breaking bones in her feet and ankle in the past.  Admits to generalized pain to her bilateral lower extremities.  The patient denies any other pedal complaints.     Primary care physician is Brandy Blanco MD.    ROS:    Constitutional: Denies nausea, vomiting, fever, chills.  Neurologic: Admits to numbness, tingling, and burning in the feet.    Vascular: Denies symptoms of lower extremity claudication.    Skin: Denies open wounds.  Otherwise negative except as noted in the HPI.     PMH:  Past Medical History:   Diagnosis Date    Allergic rhinitis     Asthma     Avascular necrosis (HCC)     bilateral hips    Breast cancer (HCC) 01/01/2000    LEFT, s/p lumpectomy, chemo and radiation    Congenital kidney disease     has had left kidney

## 2025-08-04 ENCOUNTER — TELEPHONE (OUTPATIENT)
Age: 65
End: 2025-08-04

## 2025-08-07 DIAGNOSIS — I10 ESSENTIAL HYPERTENSION: Chronic | ICD-10-CM

## 2025-08-07 DIAGNOSIS — I10 UNCONTROLLED HYPERTENSION: ICD-10-CM

## 2025-08-07 RX ORDER — AMLODIPINE BESYLATE 10 MG/1
10 TABLET ORAL DAILY
Qty: 90 TABLET | Refills: 0 | Status: SHIPPED | OUTPATIENT
Start: 2025-08-07

## 2025-08-07 RX ORDER — CARVEDILOL 12.5 MG/1
12.5 TABLET ORAL 2 TIMES DAILY WITH MEALS
Qty: 180 TABLET | Refills: 0 | Status: SHIPPED | OUTPATIENT
Start: 2025-08-07